# Patient Record
Sex: MALE | Race: WHITE | Employment: UNEMPLOYED | URBAN - METROPOLITAN AREA
[De-identification: names, ages, dates, MRNs, and addresses within clinical notes are randomized per-mention and may not be internally consistent; named-entity substitution may affect disease eponyms.]

---

## 2022-09-07 ENCOUNTER — TELEPHONE (OUTPATIENT)
Dept: CARDIOLOGY CLINIC | Age: 57
End: 2022-09-07

## 2022-09-07 NOTE — TELEPHONE ENCOUNTER
New patient referral for Dr. Katiuska Alvares from Gensarah Arnold NP for chest pain and shortness of breath. Please offer 345 on Monday 9/12/22 or 830 on 9/19.

## 2022-09-07 NOTE — TELEPHONE ENCOUNTER
I called and left a message for Anita Monroy to call the office to scheduled appointment with Dr. Misha Barber.

## 2022-09-12 ENCOUNTER — OFFICE VISIT (OUTPATIENT)
Dept: CARDIOLOGY CLINIC | Age: 57
End: 2022-09-12
Payer: MEDICAID

## 2022-09-12 VITALS
HEART RATE: 62 BPM | DIASTOLIC BLOOD PRESSURE: 92 MMHG | HEIGHT: 72 IN | OXYGEN SATURATION: 96 % | BODY MASS INDEX: 34.21 KG/M2 | WEIGHT: 252.6 LBS | SYSTOLIC BLOOD PRESSURE: 156 MMHG

## 2022-09-12 DIAGNOSIS — Z00.00 ROUTINE GENERAL MEDICAL EXAMINATION AT A HEALTH CARE FACILITY: Primary | ICD-10-CM

## 2022-09-12 DIAGNOSIS — R06.02 SOB (SHORTNESS OF BREATH): ICD-10-CM

## 2022-09-12 PROCEDURE — 93000 ELECTROCARDIOGRAM COMPLETE: CPT | Performed by: INTERNAL MEDICINE

## 2022-09-12 PROCEDURE — 99204 OFFICE O/P NEW MOD 45 MIN: CPT | Performed by: INTERNAL MEDICINE

## 2022-09-12 RX ORDER — LISINOPRIL 10 MG/1
10 TABLET ORAL DAILY
Qty: 30 TABLET | Refills: 5 | Status: SHIPPED | OUTPATIENT
Start: 2022-09-12

## 2022-09-12 NOTE — PROGRESS NOTES
Cardiology Consultation     Michelle Almonte  1965    PCP: SHERI Bowman CNP  Referring Physician: Timmy Mcgee CNP   Reason for Referral: Chest pain   Chief Complaint:   Chief Complaint   Patient presents with    New Patient     New pt x Chest tightness     Subjective:   History of Present Illness: The patient is 61 y.o. male with a past medical history significant for hypertension and hyperlipidemia. He presents as referral from Timmy Mcgee CNP regarding chest pain. He reports ongoing shortness of breath and a decline in his overall health. He experienced an injury at work and reports his symptoms began at that time. He has limitations in his mobility related to this accident and presents with a cane to aid in ambulation. He will be undergoing repair of a torn meniscus in the future. He will become short of breath with walking even short distances. He reports ongoing fatigue and does snore at night. He will experience chest pain at times as well. He has never been a smoker. His father experienced a heart attack at 55 and passed from cardiac causes at 47. Past Medical History:   Diagnosis Date    Pneumonia      Past Surgical History:   Procedure Laterality Date    APPENDECTOMY       Family History   Problem Relation Age of Onset    Kidney Disease Mother     Heart Failure Mother     Heart Attack Father 52    Heart Disease Father     Kidney Disease Brother      Social History     Tobacco Use    Smoking status: Former     Packs/day: 0.50     Years: 20.00     Pack years: 10.00     Types: Cigarettes     Quit date:      Years since quittin.7    Smokeless tobacco: Never   Substance Use Topics    Alcohol use:  Yes     Alcohol/week: 6.0 standard drinks     Types: 6 Cans of beer per week     Comment: 1 beer daily    Drug use: No       No Known Allergies  Current Outpatient Medications   Medication Sig Dispense Refill    atorvastatin (LIPITOR) 40 MG tablet Take 1 tablet by mouth daily 90 tablet 1    escitalopram (LEXAPRO) 10 MG tablet Take 1 tablet by mouth daily 30 tablet 1    metoprolol succinate (TOPROL XL) 25 MG extended release tablet Take 1 tablet by mouth daily 30 tablet 1     No current facility-administered medications for this visit. Review of Systems:  Constitutional: No unanticipated weight loss. There's been no change in energy level, sleep pattern, or activity level. No fevers, chills. Eyes: No visual changes or diplopia. No scleral icterus. ENT: No Headaches, hearing loss or vertigo. No mouth sores or sore throat. Cardiovascular: as reviewed in HPI  Respiratory: No cough or wheezing, no sputum production. No hemoptysis. Gastrointestinal: No abdominal pain, appetite loss, blood in stools. No change in bowel or bladder habits. Genitourinary: No dysuria, trouble voiding, or hematuria. Musculoskeletal:  No gait disturbance, no joint complaints. Integumentary: No rash or pruritis. Neurological: No headache, diplopia, change in muscle strength, numbness or tingling. Psychiatric: No anxiety or depression. Endocrine: No temperature intolerance. No excessive thirst, fluid intake, or urination. No tremor. Hematologic/Lymphatic: No abnormal bruising or bleeding, blood clots or swollen lymph nodes. Allergic/Immunologic: No nasal congestion or hives. Physical Exam:   BP (!) 156/92 (Site: Left Upper Arm, Position: Sitting, Cuff Size: Medium Adult)   Pulse 62   Ht 6' (1.829 m)   Wt 252 lb 9.6 oz (114.6 kg)   SpO2 96%   BMI 34.26 kg/m²   Wt Readings from Last 3 Encounters:   09/12/22 252 lb 9.6 oz (114.6 kg)   09/07/22 250 lb 12.8 oz (113.8 kg)   08/31/18 242 lb 4.6 oz (109.9 kg)     Constitutional: He is oriented to person, place, and time. He appears well-developed and well-nourished. In no acute distress. Head: Normocephalic and atraumatic. Pupils equal and round. Neck: Neck supple. No JVP or carotid bruit appreciated.  No mass and no thyromegaly present. No lymphadenopathy present. Cardiovascular: Normal rate. Normal heart sounds. Exam reveals no gallop and no friction rub. No murmur heard. Pulmonary/Chest: Effort normal and breath sounds normal. No respiratory distress. He has no wheezes, rhonchi or rales. Abdominal: Soft, non-tender. Bowel sounds are normal. He exhibits no organomegaly, mass or bruit. Extremities: No edema. No cyanosis or clubbing. Pulses are 2+ radial/carotid bilaterally. Neurological: No gross cranial nerve deficit. Coordination normal.   Skin: Skin is warm and dry. There is no rash or diaphoresis. Psychiatric: He has a normal mood and affect. His speech is normal and behavior is normal.     Lab Review:   FLP:    Lab Results   Component Value Date/Time    TRIG 300 09/07/2022 01:48 PM    HDL 39 09/07/2022 01:48 PM    LDLCALC 136 09/07/2022 01:48 PM    LABVLDL 60 09/07/2022 01:48 PM     BUN/Creatinine:    Lab Results   Component Value Date/Time    BUN 17 09/07/2022 01:48 PM    CREATININE 1.2 09/07/2022 01:48 PM     PT/INR, TNI, HGB A1C:   Lab Results   Component Value Date/Time    LABA1C 5.4 09/07/2022 01:48 PM      No results found for: CBCAUTODIF    EKG Interpretation: NSR, normal ECG       All above diagnostic testing and laboratory data was independently visualized and reviewed by me (not simply review of report)       Assessment and Plan   1) Chest pain   Occurs with both rest or exertion   Complete previously ordered stress perfusion     2) Shortness of breath   Complete echocardiogram to rule out structural abnormalities     3) Essential hypertension   Uncontrolled  Begin lisinopril 10 mg daily     Follow up in 1 year or sooner pending test results       Thank you very much for allowing me to participate in the care of your patient. Please do not hesitate to contact me if you have any questions.       Kianna Parmar MD [address] [place] [address], Interventional Cardiology, and Peripheral Vascular Disease   [facility] [place]   Ph: [phone]  Fax: [phone]      This note was scribed in the presence of Tammie Mason MD by Ben Hutton RN. Physician Attestation:  The scribes documentation has been prepared under my direction and personally reviewed by me in its entirety. I confirm the note above accurately reflects all work, treatment, procedures, and medical decision making performed by me.     Electronically signed by Anna Loza MD on 10/2/2022 at 2:48 PM

## 2022-09-21 ENCOUNTER — HOSPITAL ENCOUNTER (OUTPATIENT)
Dept: NON INVASIVE DIAGNOSTICS | Age: 57
Discharge: HOME | End: 2022-09-21
Payer: MEDICAID

## 2022-09-21 DIAGNOSIS — R07.89 CHEST TIGHTNESS: ICD-10-CM

## 2022-09-21 LAB
LV EF: 63 %
LVEF MODALITY: NORMAL

## 2022-09-21 PROCEDURE — 93017 CV STRESS TEST TRACING ONLY: CPT

## 2022-09-21 PROCEDURE — 6360000002 HC RX W HCPCS

## 2022-09-21 PROCEDURE — 78452 HT MUSCLE IMAGE SPECT MULT: CPT

## 2022-09-21 PROCEDURE — A9502 TC99M TETROFOSMIN: HCPCS | Performed by: NURSE PRACTITIONER

## 2022-09-21 PROCEDURE — 3430000000 HC RX DIAGNOSTIC RADIOPHARMACEUTICAL: Performed by: NURSE PRACTITIONER

## 2022-09-21 PROCEDURE — 6360000002 HC RX W HCPCS: Performed by: NURSE PRACTITIONER

## 2022-09-21 RX ADMIN — TETROFOSMIN 30 MILLICURIE: 1.38 INJECTION, POWDER, LYOPHILIZED, FOR SOLUTION INTRAVENOUS at 16:05

## 2022-09-21 RX ADMIN — TETROFOSMIN 11 MILLICURIE: 1.38 INJECTION, POWDER, LYOPHILIZED, FOR SOLUTION INTRAVENOUS at 13:38

## 2022-09-21 RX ADMIN — REGADENOSON 0.4 MG: 0.08 INJECTION, SOLUTION INTRAVENOUS at 16:05

## 2022-10-21 ENCOUNTER — HOSPITAL ENCOUNTER (OUTPATIENT)
Dept: CARDIOLOGY | Age: 57
Discharge: HOME | End: 2022-10-21
Payer: MEDICAID

## 2022-10-21 DIAGNOSIS — R06.02 SOB (SHORTNESS OF BREATH): ICD-10-CM

## 2022-10-21 PROCEDURE — 93306 TTE W/DOPPLER COMPLETE: CPT

## 2022-11-01 RX ORDER — DICLOFENAC SODIUM 75 MG/1
TABLET, DELAYED RELEASE ORAL 2 TIMES DAILY
COMMUNITY
Start: 2022-10-06 | End: 2022-11-17

## 2022-11-01 RX ORDER — AMOXICILLIN 500 MG/1
500 CAPSULE ORAL 3 TIMES DAILY
COMMUNITY
End: 2022-11-17

## 2022-11-01 NOTE — PROGRESS NOTES
[facility] PRE-SURGICAL TESTING INSTRUCTIONS                      PRIOR TO PROCEDURE DATE:    1. PLEASE FOLLOW ANY INSTRUCTIONS GIVEN TO YOU PER YOUR SURGEON. 2. Arrange for someone to drive you home and be with you for the first 24 hours after discharge for your safety after your procedure for which you received sedation. Ensure it is someone we can share information with regarding your discharge. NOTE: At this time ONLY 2 ADULTS may accompany you     3. You must contact your surgeon for instructions IF:  You are taking any blood thinners, aspirin, anti-inflammatory or vitamins. There is a change in your physical condition such as a cold, fever, rash, cuts, sores, or any other infection, especially near your surgical site. 4. Do not drink alcohol the day before or day of your procedure. Do not use any recreational marijuana at least 24 hours or street drugs (heroin, cocaine) at minimum 5 days prior to your procedure. 5. A Pre-Surgical History and Physical MUST be completed WITHIN 30 DAYS OR LESS prior to your procedure. by your Physician or an Urgent Care        THE DAY OF YOUR PROCEDURE:  1. Follow instructions for ARRIVAL TIME as DIRECTED BY YOUR SURGEON. 2. Enter the MAIN entrance from [address] and follow the signs to the free Parking [employer] or [employer] (offered free of charge 7 am-5pm). 3. Enter the Main Entrance of the hospital (do not enter from the lower level of the parking garage). Upon entrance, check in with the  at the surgical information desk on your LEFT. Bring your insurance card and photo ID to register      4. DO NOT EAT ANYTHING 8 hours prior to arrival for surgery. You may have up to 8 ounces of water 4 hours prior to your arrival for surgery. NOTE: ALL Gastric, Bariatric & Bowel surgery patients - you MUST follow your surgeon's instructions regarding eating/ drinking as you will have very specific instructions to follow.   If you did not receive these, call your surgeon's office immediately. 5. MEDICATIONS:  Take the following medications with a SMALL sip of water: METOPROLOL  Use your usual dose of inhalers the morning of surgery. BRING your rescue inhaler with you to hospital.   Anesthesia does NOT want you to take insulin the morning of surgery. They will control your blood sugar while you are at the hospital. Please contact your ordering physician for instructions regarding your insulin the night before your procedure. If you have an insulin pump, please keep it set on basal rate. Bariatric patient's call your surgeon if on diabetic medications as some may need to be stopped 1 week prior to surgery    6. Do not swallow additional water when brushing teeth. No gum, candy, mints, or ice chips. Refrain from smoking or at least decrease the amount on day of surgery. 7. Morning of surgery:   Take a shower with an antibacterial soap (i.e., Safeguard or Dial) OR your physician may have instructed you to use Hibiclens. Dress in loose, comfortable clothing appropriate for redressing after your procedure. Do not wear jewelry (including body piercings), make-up (especially NO eye make-up), fingernail polish (NO toenail polish if foot/leg surgery), lotion, powders, or metal hairclips. Do not shave or wax for 72 hours prior to procedure near your operative site. Shaving with a razor can irritate your skin and make it easier to develop an infection. On the day of your procedure, any hair that needs to be removed near the surgical site will be 'clipped' by a healthcare worker using a special clipper designed to avoid skin irritation. 8. Dentures, glasses, or contacts will need to be removed before your procedure. Bring cases for your glasses, contacts, dentures, or hearing aids to protect them while you are in surgery. 9. If you use a CPAP, please bring it with you on the day of your procedure.     10. We recommend that valuable personal belongings such as cash, cell phones, e-tablets, or jewelry, be left at home during your stay. The hospital will not be responsible for valuables that are not secured in the hospital safe. However, if your insurance requires a co-pay, you may want to bring a method of payment, i.e., Check or credit card, if you wish to pay your co-pay the day of surgery. 11. If you are to stay overnight, you may bring a bag with personal items. Please have any large items you may need brought in by your family after your arrival to your hospital room. 12. If you have a Living Will or Durable Power of , please bring a copy on the day of your procedure. How we keep you safe and work to prevent surgical site infections:   1. Health care workers should always check your ID bracelet to verify your name and birth date. You will be asked many times to state your name, date of birth, and allergies. 2. Health care workers should always clean their hands with soap or alcohol gel before providing care to you. It is okay to ask anyone if they cleaned their hands before they touch you. 3. You will be actively involved in verifying the type of procedure you are having and ensuring the correct surgical site. This will be confirmed multiple times prior to your procedure. Do NOT laura your surgery site UNLESS instructed to by your surgeon. 4. When you are in the operating room, your surgical site will be cleansed with a special soap, and in most cases, you will be given an antibiotic before the surgery begins. What to expect AFTER your procedure? 1. Immediately following your procedure, your will be taken to the PACU for the first phase of your recovery. Your nurse will help you recover from any potential side effects of anesthesia, such as extreme drowsiness, changes in your vital signs or breathing patterns. Nausea, headache, muscle aches, or sore throat may also occur after anesthesia.   Your nurse will help you manage these potential side effects. 2. For comfort and safety, arrange to have someone at home with you for the first 24 hours after discharge. 3. You and your family will be given written instructions about your diet, activity, dressing care, medications, and return visits. 4. Once at home, should issues with nausea, pain, or bleeding occur, or should you notice any signs of infection, you should call your surgeon. 5. Always clean your hands before and after caring for your wound. Do not let your family touch your surgery site without cleaning their hands. 6. Narcotic pain medications can cause significant constipation. You may want to add a stool softener to your postoperative medication schedule or speak to your surgeon on how best to manage this SIDE EFFECT. SPECIAL INSTRUCTIONS     Thank you for allowing us to care for you. We strive to exceed your expectations in the delivery of care and service provided to you and your family. If you need to contact the [place] [address] staff for any reason, please call us at [phone]    Instructions reviewed with patient'S SIGNIFICANT OTHER - DELORES  during preadmission testing phone interview.   Saúl Dorsey RN.11/1/2022 .10:50 AM      ADDITIONAL EDUCATIONAL INFORMATION REVIEWED PER PHONE WITH YOU AND/OR YOUR FAMILY:  Yes Hibiclens® Bathing Instructions   Yes Antibacterial Soap   PER SURGOEN INSTRUCTIONS

## 2022-11-01 NOTE — PROGRESS NOTES
Surgery was changed from 10/5 to 11/2 due to OR being closed for Mosque holiday. Patient has not seen PCP since 9/7 and patient significant other states the were not told he needed a pre-op within 30 days. Patient on amoxicillin because crown fell off recently. Significant other stated patient did not have any mouth pain or signs of infection, assuming antibiotic was only preventative since appointment for temporary crown not available for a few more weeks. Stated nothing loose or chipped in mouth. Patient has not received arrival time et. Notified arrival time NEEDS TO COME FROM SURGEON, but informed of  time listed in Epic currently. Bhavana Teixeira, at surgeon office. Notified of all above and that [address] [place] [address] TO DO H&P DOS.    Baldev Pap

## 2022-11-01 NOTE — PROGRESS NOTES
Place patient label inside box (if no patient label, complete below)  Name:  :  MR#:   [address] / PROCEDURE  I (we), John Patel (Patient Name) authorize Andreea Manzano MD  (Provider / Mary Gil) and/or such assistants as may be selected by him/her, to perform the following operation/procedure(s): RIGHT KNEE ARTHROSCOPY WITH MEDIAL MENISCUS REPAIR        Note: If unable to obtain consent prior to an emergent procedure, document the emergent reason in the medical record. This procedure has been explained to my (our) satisfaction and included in the explanation was: The intended benefit, nature, and extent of the procedure to be performed; The significant risks involved and the probability of success; Alternative procedures and methods of treatment; The dangers and probable consequences of such alternatives (including no procedure or treatment); The expected consequences of the procedure on my future health; Whether other qualified individuals would be performing important surgical tasks and/or whether  would be present to advise or support the procedure. I (we) understand that there are other risks of infection and other serious complications in the pre-operative/procedural and postoperative/procedural stages of my (our) care. I (we) have asked all of the questions which I (we) thought were important in deciding whether or not to undergo treatment or diagnosis. These questions have been answered to my (our) satisfaction. I (we) understand that no assurance can be given that the procedure will be a success, and no guarantee or warranty of success has been given to me (us). It has been explained to me (us) that during the course of the operation/procedure, unforeseen conditions may be revealed that necessitate extension of the original procedure(s) or different procedure(s) than those set forth in Paragraph 1.  I (we) authorize and request that the above-named physician, his/her assistants or his/her designees, perform procedures as necessary and desirable if deemed to be in my (our) best interest.     Revised 8/2/2021                                                                          Page 1 of 2       I acknowledge that health care personnel may be observing this procedure for the purpose of medical education or other specified purposes as may be necessary as requested and/or approved by my (our) physician. I (we) consent to the disposal by the hospital Pathologist of the removed tissue, parts or organs in accordance with hospital policy. I do ____ do not ____ consent to the use of a local infiltration pain blocking agent that will be used by my provider/surgical provider to help alleviate pain during my procedure. I do ____ do not ____ consent to an emergent blood transfusion in the case of a life-threatening situation that requires blood components to be administered. This consent is valid for 24 hours from the beginning of the procedure. This patient does ____ or does not ____ currently have a DNR status/order. If DNR order is in place, obtain Addendum to the Surgical Consent for ALL Patients with a DNR Order to address blaise-operative status for limited intervention or DNR suspension.      I have read and fully understand the above Consent for Operation/Procedure and that all blanks were completed before I signed the consent.   _____________________________       _____________________      ____/____am/pm  Signature of Patient or legal representative      Printed Name / Relationship            Date / Time   ____________________________       _____________________      ____/____am/pm  Witness to Signature                                    Printed Name                    Date / Time    If patient is unable to sign or is a minor, complete the following)  Patient is a minor, ____ years of age, or unable to sign because:   ______________________________________________________________________________________________    If a phone consent is obtained, consent will be documented by using two health care professionals, each affirming that the consenting party has no questions and gives consent for the procedure discussed with the physician/provider.   _____________________          ____________________       _____/_____am/pm   2nd witness to phone consent        Printed name           Date / Time    Informed Consent:  I have provided the explanation described above in section 1 to the patient and/or legal representative.  I have provided the patient and/or legal representative with an opportunity to ask any questions about the proposed operation/procedure.   ___________________________          ____________________         ____/____am/pm  Provider / Proceduralist                            Printed name            Date / Time  Revised 8/2/2021                                                                      Page 2 of 2

## 2022-11-02 ENCOUNTER — ANESTHESIA (OUTPATIENT)
Dept: OPERATING ROOM | Age: 57
End: 2022-11-02
Payer: MEDICAID

## 2022-11-02 ENCOUNTER — ANESTHESIA EVENT (OUTPATIENT)
Dept: OPERATING ROOM | Age: 57
End: 2022-11-02
Payer: MEDICAID

## 2022-11-02 ENCOUNTER — HOSPITAL ENCOUNTER (OUTPATIENT)
Age: 57
Setting detail: OUTPATIENT SURGERY
Discharge: HOME | End: 2022-11-02
Attending: ORTHOPAEDIC SURGERY | Admitting: ORTHOPAEDIC SURGERY
Payer: MEDICAID

## 2022-11-02 VITALS
HEART RATE: 64 BPM | SYSTOLIC BLOOD PRESSURE: 125 MMHG | HEIGHT: 72 IN | DIASTOLIC BLOOD PRESSURE: 89 MMHG | WEIGHT: 247.6 LBS | OXYGEN SATURATION: 99 % | BODY MASS INDEX: 33.54 KG/M2 | TEMPERATURE: 97.4 F | RESPIRATION RATE: 16 BRPM

## 2022-11-02 DIAGNOSIS — S83.241A ACUTE TEAR OF POSTERIOR ASPECT OF MEDIAL MENISCUS OF RIGHT KNEE: Primary | ICD-10-CM

## 2022-11-02 PROCEDURE — 6360000002 HC RX W HCPCS: Performed by: ANESTHESIOLOGY

## 2022-11-02 PROCEDURE — 7100000011 HC PHASE II RECOVERY - ADDTL 15 MIN: Performed by: ORTHOPAEDIC SURGERY

## 2022-11-02 PROCEDURE — 2720000010 HC SURG SUPPLY STERILE: Performed by: ORTHOPAEDIC SURGERY

## 2022-11-02 PROCEDURE — 3700000001 HC ADD 15 MINUTES (ANESTHESIA): Performed by: ORTHOPAEDIC SURGERY

## 2022-11-02 PROCEDURE — 6360000002 HC RX W HCPCS: Performed by: ORTHOPAEDIC SURGERY

## 2022-11-02 PROCEDURE — 7100000001 HC PACU RECOVERY - ADDTL 15 MIN: Performed by: ORTHOPAEDIC SURGERY

## 2022-11-02 PROCEDURE — 2580000003 HC RX 258: Performed by: ANESTHESIOLOGY

## 2022-11-02 PROCEDURE — 3700000000 HC ANESTHESIA ATTENDED CARE: Performed by: ORTHOPAEDIC SURGERY

## 2022-11-02 PROCEDURE — 6370000000 HC RX 637 (ALT 250 FOR IP): Performed by: ORTHOPAEDIC SURGERY

## 2022-11-02 PROCEDURE — 7100000000 HC PACU RECOVERY - FIRST 15 MIN: Performed by: ORTHOPAEDIC SURGERY

## 2022-11-02 PROCEDURE — 6370000000 HC RX 637 (ALT 250 FOR IP): Performed by: ANESTHESIOLOGY

## 2022-11-02 PROCEDURE — 7100000010 HC PHASE II RECOVERY - FIRST 15 MIN: Performed by: ORTHOPAEDIC SURGERY

## 2022-11-02 PROCEDURE — 6360000002 HC RX W HCPCS

## 2022-11-02 PROCEDURE — 2580000003 HC RX 258: Performed by: ORTHOPAEDIC SURGERY

## 2022-11-02 PROCEDURE — 64447 NJX AA&/STRD FEMORAL NRV IMG: CPT | Performed by: ANESTHESIOLOGY

## 2022-11-02 PROCEDURE — 2500000003 HC RX 250 WO HCPCS

## 2022-11-02 PROCEDURE — 3600000014 HC SURGERY LEVEL 4 ADDTL 15MIN: Performed by: ORTHOPAEDIC SURGERY

## 2022-11-02 PROCEDURE — 3600000004 HC SURGERY LEVEL 4 BASE: Performed by: ORTHOPAEDIC SURGERY

## 2022-11-02 PROCEDURE — 2709999900 HC NON-CHARGEABLE SUPPLY: Performed by: ORTHOPAEDIC SURGERY

## 2022-11-02 RX ORDER — ROPIVACAINE HYDROCHLORIDE 5 MG/ML
INJECTION, SOLUTION EPIDURAL; INFILTRATION; PERINEURAL
Status: COMPLETED | OUTPATIENT
Start: 2022-11-02 | End: 2022-11-02

## 2022-11-02 RX ORDER — SODIUM CHLORIDE, SODIUM LACTATE, POTASSIUM CHLORIDE, CALCIUM CHLORIDE 600; 310; 30; 20 MG/100ML; MG/100ML; MG/100ML; MG/100ML
INJECTION, SOLUTION INTRAVENOUS CONTINUOUS
Status: DISCONTINUED | OUTPATIENT
Start: 2022-11-02 | End: 2022-11-02 | Stop reason: HOSPADM

## 2022-11-02 RX ORDER — OXYCODONE HCL 10 MG/1
10 TABLET, FILM COATED, EXTENDED RELEASE ORAL ONCE
Status: COMPLETED | OUTPATIENT
Start: 2022-11-02 | End: 2022-11-02

## 2022-11-02 RX ORDER — METOPROLOL SUCCINATE 50 MG/1
25 TABLET, EXTENDED RELEASE ORAL ONCE
Status: COMPLETED | OUTPATIENT
Start: 2022-11-02 | End: 2022-11-02

## 2022-11-02 RX ORDER — PROCHLORPERAZINE EDISYLATE 5 MG/ML
5 INJECTION INTRAMUSCULAR; INTRAVENOUS
Status: DISCONTINUED | OUTPATIENT
Start: 2022-11-02 | End: 2022-11-02 | Stop reason: HOSPADM

## 2022-11-02 RX ORDER — ONDANSETRON 2 MG/ML
INJECTION INTRAMUSCULAR; INTRAVENOUS PRN
Status: DISCONTINUED | OUTPATIENT
Start: 2022-11-02 | End: 2022-11-02 | Stop reason: SDUPTHER

## 2022-11-02 RX ORDER — OXYCODONE HYDROCHLORIDE 5 MG/1
5 TABLET ORAL EVERY 6 HOURS PRN
Qty: 28 TABLET | Refills: 0 | Status: SHIPPED | OUTPATIENT
Start: 2022-11-02 | End: 2022-11-09

## 2022-11-02 RX ORDER — SODIUM CHLORIDE 0.9 % (FLUSH) 0.9 %
5-40 SYRINGE (ML) INJECTION PRN
Status: DISCONTINUED | OUTPATIENT
Start: 2022-11-02 | End: 2022-11-02 | Stop reason: HOSPADM

## 2022-11-02 RX ORDER — SODIUM CHLORIDE 0.9 % (FLUSH) 0.9 %
5-40 SYRINGE (ML) INJECTION EVERY 12 HOURS SCHEDULED
Status: DISCONTINUED | OUTPATIENT
Start: 2022-11-02 | End: 2022-11-02 | Stop reason: HOSPADM

## 2022-11-02 RX ORDER — DEXAMETHASONE SODIUM PHOSPHATE 4 MG/ML
INJECTION, SOLUTION INTRA-ARTICULAR; INTRALESIONAL; INTRAMUSCULAR; INTRAVENOUS; SOFT TISSUE PRN
Status: DISCONTINUED | OUTPATIENT
Start: 2022-11-02 | End: 2022-11-02 | Stop reason: SDUPTHER

## 2022-11-02 RX ORDER — ONDANSETRON 2 MG/ML
4 INJECTION INTRAMUSCULAR; INTRAVENOUS
Status: DISCONTINUED | OUTPATIENT
Start: 2022-11-02 | End: 2022-11-02 | Stop reason: HOSPADM

## 2022-11-02 RX ORDER — MIDAZOLAM HYDROCHLORIDE 1 MG/ML
INJECTION INTRAMUSCULAR; INTRAVENOUS PRN
Status: DISCONTINUED | OUTPATIENT
Start: 2022-11-02 | End: 2022-11-02 | Stop reason: SDUPTHER

## 2022-11-02 RX ORDER — PROPOFOL 10 MG/ML
INJECTION, EMULSION INTRAVENOUS PRN
Status: DISCONTINUED | OUTPATIENT
Start: 2022-11-02 | End: 2022-11-02 | Stop reason: SDUPTHER

## 2022-11-02 RX ORDER — DEXAMETHASONE SODIUM PHOSPHATE 10 MG/ML
INJECTION, SOLUTION INTRAMUSCULAR; INTRAVENOUS
Status: DISCONTINUED
Start: 2022-11-02 | End: 2022-11-02 | Stop reason: HOSPADM

## 2022-11-02 RX ORDER — ASPIRIN 81 MG/1
81 TABLET ORAL 2 TIMES DAILY
Qty: 60 TABLET | Refills: 0 | Status: SHIPPED | OUTPATIENT
Start: 2022-11-02

## 2022-11-02 RX ORDER — AMOXICILLIN 250 MG
2 CAPSULE ORAL DAILY PRN
Qty: 30 TABLET | Refills: 2 | Status: SHIPPED | OUTPATIENT
Start: 2022-11-02 | End: 2022-11-17

## 2022-11-02 RX ORDER — SODIUM CHLORIDE 9 MG/ML
INJECTION, SOLUTION INTRAVENOUS PRN
Status: DISCONTINUED | OUTPATIENT
Start: 2022-11-02 | End: 2022-11-02 | Stop reason: HOSPADM

## 2022-11-02 RX ORDER — ROPIVACAINE HYDROCHLORIDE 5 MG/ML
INJECTION, SOLUTION EPIDURAL; INFILTRATION; PERINEURAL
Status: COMPLETED
Start: 2022-11-02 | End: 2022-11-02

## 2022-11-02 RX ORDER — FENTANYL CITRATE 50 UG/ML
25 INJECTION, SOLUTION INTRAMUSCULAR; INTRAVENOUS EVERY 5 MIN PRN
Status: COMPLETED | OUTPATIENT
Start: 2022-11-02 | End: 2022-11-02

## 2022-11-02 RX ORDER — FENTANYL CITRATE 50 UG/ML
INJECTION, SOLUTION INTRAMUSCULAR; INTRAVENOUS PRN
Status: DISCONTINUED | OUTPATIENT
Start: 2022-11-02 | End: 2022-11-02 | Stop reason: SDUPTHER

## 2022-11-02 RX ORDER — LABETALOL HYDROCHLORIDE 5 MG/ML
10 INJECTION, SOLUTION INTRAVENOUS
Status: DISCONTINUED | OUTPATIENT
Start: 2022-11-02 | End: 2022-11-02 | Stop reason: HOSPADM

## 2022-11-02 RX ORDER — LIDOCAINE HYDROCHLORIDE 20 MG/ML
INJECTION, SOLUTION EPIDURAL; INFILTRATION; INTRACAUDAL; PERINEURAL PRN
Status: DISCONTINUED | OUTPATIENT
Start: 2022-11-02 | End: 2022-11-02 | Stop reason: SDUPTHER

## 2022-11-02 RX ORDER — HYDRALAZINE HYDROCHLORIDE 20 MG/ML
10 INJECTION INTRAMUSCULAR; INTRAVENOUS
Status: DISCONTINUED | OUTPATIENT
Start: 2022-11-02 | End: 2022-11-02 | Stop reason: HOSPADM

## 2022-11-02 RX ORDER — LIDOCAINE HYDROCHLORIDE 10 MG/ML
1 INJECTION, SOLUTION EPIDURAL; INFILTRATION; INTRACAUDAL; PERINEURAL
Status: DISCONTINUED | OUTPATIENT
Start: 2022-11-02 | End: 2022-11-02 | Stop reason: HOSPADM

## 2022-11-02 RX ORDER — CELECOXIB 200 MG/1
400 CAPSULE ORAL ONCE
Status: COMPLETED | OUTPATIENT
Start: 2022-11-02 | End: 2022-11-02

## 2022-11-02 RX ORDER — GABAPENTIN 300 MG/1
300 CAPSULE ORAL ONCE
Status: COMPLETED | OUTPATIENT
Start: 2022-11-02 | End: 2022-11-02

## 2022-11-02 RX ORDER — MIDAZOLAM HYDROCHLORIDE 1 MG/ML
INJECTION INTRAMUSCULAR; INTRAVENOUS
Status: COMPLETED
Start: 2022-11-02 | End: 2022-11-02

## 2022-11-02 RX ORDER — DEXAMETHASONE SODIUM PHOSPHATE 10 MG/ML
10 INJECTION, SOLUTION INTRAMUSCULAR; INTRAVENOUS ONCE
Status: COMPLETED | OUTPATIENT
Start: 2022-11-02 | End: 2022-11-02

## 2022-11-02 RX ADMIN — DEXAMETHASONE SODIUM PHOSPHATE 10 MG: 4 INJECTION, SOLUTION INTRAMUSCULAR; INTRAVENOUS at 13:13

## 2022-11-02 RX ADMIN — CEFAZOLIN 2000 MG: 2 INJECTION, POWDER, FOR SOLUTION INTRAMUSCULAR; INTRAVENOUS at 13:07

## 2022-11-02 RX ADMIN — MIDAZOLAM HYDROCHLORIDE 2 MG: 1 INJECTION INTRAMUSCULAR; INTRAVENOUS at 13:04

## 2022-11-02 RX ADMIN — MIDAZOLAM HYDROCHLORIDE 2 MG: 2 INJECTION, SOLUTION INTRAMUSCULAR; INTRAVENOUS at 12:52

## 2022-11-02 RX ADMIN — ROPIVACAINE HYDROCHLORIDE 30 ML: 5 INJECTION, SOLUTION EPIDURAL; INFILTRATION; PERINEURAL at 12:52

## 2022-11-02 RX ADMIN — FENTANYL CITRATE 25 MCG: 50 INJECTION INTRAMUSCULAR; INTRAVENOUS at 14:40

## 2022-11-02 RX ADMIN — PROPOFOL 200 MG: 10 INJECTION, EMULSION INTRAVENOUS at 13:09

## 2022-11-02 RX ADMIN — OXYCODONE HYDROCHLORIDE 10 MG: 10 TABLET, FILM COATED, EXTENDED RELEASE ORAL at 12:40

## 2022-11-02 RX ADMIN — FENTANYL CITRATE 50 MCG: 50 INJECTION, SOLUTION INTRAMUSCULAR; INTRAVENOUS at 13:09

## 2022-11-02 RX ADMIN — SODIUM CHLORIDE, POTASSIUM CHLORIDE, SODIUM LACTATE AND CALCIUM CHLORIDE: 600; 310; 30; 20 INJECTION, SOLUTION INTRAVENOUS at 12:39

## 2022-11-02 RX ADMIN — FENTANYL CITRATE 25 MCG: 50 INJECTION INTRAMUSCULAR; INTRAVENOUS at 14:45

## 2022-11-02 RX ADMIN — GABAPENTIN 300 MG: 300 CAPSULE ORAL at 12:40

## 2022-11-02 RX ADMIN — ONDANSETRON 4 MG: 2 INJECTION INTRAMUSCULAR; INTRAVENOUS at 13:13

## 2022-11-02 RX ADMIN — DEXAMETHASONE SODIUM PHOSPHATE 10 MG: 10 INJECTION, SOLUTION INTRAMUSCULAR; INTRAVENOUS at 12:40

## 2022-11-02 RX ADMIN — METOPROLOL SUCCINATE 25 MG: 50 TABLET, EXTENDED RELEASE ORAL at 12:56

## 2022-11-02 RX ADMIN — LIDOCAINE HYDROCHLORIDE 100 MG: 20 INJECTION, SOLUTION EPIDURAL; INFILTRATION; INTRACAUDAL; PERINEURAL at 13:09

## 2022-11-02 RX ADMIN — CELECOXIB 400 MG: 200 CAPSULE ORAL at 12:40

## 2022-11-02 RX ADMIN — FENTANYL CITRATE 50 MCG: 50 INJECTION, SOLUTION INTRAMUSCULAR; INTRAVENOUS at 13:16

## 2022-11-02 ASSESSMENT — ENCOUNTER SYMPTOMS
RESPIRATORY NEGATIVE: 1
EYES NEGATIVE: 1
ALLERGIC/IMMUNOLOGIC NEGATIVE: 1
GASTROINTESTINAL NEGATIVE: 1
SHORTNESS OF BREATH: 1

## 2022-11-02 ASSESSMENT — PAIN SCALES - GENERAL
PAINLEVEL_OUTOF10: 0
PAINLEVEL_OUTOF10: 1
PAINLEVEL_OUTOF10: 4
PAINLEVEL_OUTOF10: 1
PAINLEVEL_OUTOF10: 0
PAINLEVEL_OUTOF10: 4
PAINLEVEL_OUTOF10: 1

## 2022-11-02 ASSESSMENT — PAIN DESCRIPTION - PAIN TYPE
TYPE: ACUTE PAIN;SURGICAL PAIN
TYPE: ACUTE PAIN

## 2022-11-02 ASSESSMENT — PAIN DESCRIPTION - LOCATION
LOCATION: KNEE

## 2022-11-02 ASSESSMENT — PAIN DESCRIPTION - ORIENTATION
ORIENTATION: RIGHT

## 2022-11-02 ASSESSMENT — PAIN - FUNCTIONAL ASSESSMENT: PAIN_FUNCTIONAL_ASSESSMENT: PREVENTS OR INTERFERES SOME ACTIVE ACTIVITIES AND ADLS

## 2022-11-02 ASSESSMENT — PAIN DESCRIPTION - DESCRIPTORS
DESCRIPTORS: ACHING
DESCRIPTORS: ACHING
DESCRIPTORS: DISCOMFORT
DESCRIPTORS: ACHING
DESCRIPTORS: ACHING

## 2022-11-02 NOTE — PROGRESS NOTES
Confirmed pt ok to go home with crutches. Educated pt and significant other on proper use with verbalization of understanding. Good return demo.

## 2022-11-02 NOTE — ANESTHESIA PRE PROCEDURE
Department of Anesthesiology  Preprocedure Note       Name:  Jose Yoon   Age:  61 y.o.  :  1965                                          MRN:  [id number]         Date:  2022      Surgeon: Tamika Vasquez):  Radha Garrett MD    Procedure: Procedure(s):  RIGHT KNEE ARTHROSCOPY WITH MEDIAL MENISCUS REPAIR    Medications prior to admission:   Prior to Admission medications    Medication Sig Start Date End Date Taking? Authorizing Provider   aspirin EC 81 MG EC tablet Take 1 tablet by mouth 2 times daily 22  Yes Radha Garrett MD   senna-docusate (PERICOLACE) 8.6-50 MG per tablet Take 2 tablets by mouth daily as needed for Constipation 22  Yes Radha Garrett MD   oxyCODONE (ROXICODONE) 5 MG immediate release tablet Take 1 tablet by mouth every 6 hours as needed for Pain for up to 7 days. Intended supply: 7 days.  Take lowest dose possible to manage pain 22 Yes Radha Garrett MD   diclofenac (VOLTAREN) 75 MG EC tablet Take by mouth 2 times daily 10/6/22  Yes Historical Provider, MD   amoxicillin (AMOXIL) 500 MG capsule Take 500 mg by mouth 3 times daily   Yes Historical Provider, MD   lisinopril (PRINIVIL;ZESTRIL) 10 MG tablet Take 1 tablet by mouth daily 22   Tobi Lyons MD   atorvastatin (LIPITOR) 40 MG tablet Take 1 tablet by mouth daily 22   SHERI Gong CNP   metoprolol succinate (TOPROL XL) 25 MG extended release tablet Take 1 tablet by mouth daily 22   SHERI Gong CNP       Current medications:    Current Facility-Administered Medications   Medication Dose Route Frequency Provider Last Rate Last Admin    lidocaine PF 1 % injection 1 mL  1 mL IntraDERmal Once PRN Gaby Antunez MD        lactated ringers infusion   IntraVENous Continuous Gaby Antunez  mL/hr at 22 1304 NoRateChange at 22 1304    sodium chloride flush 0.9 % injection 5-40 mL  5-40 mL IntraVENous 2 times per day Desire Canales MD        sodium chloride flush 0.9 % injection 5-40 mL  5-40 mL IntraVENous PRN Desire Canales MD        0.9 % sodium chloride infusion   IntraVENous PRN Desire Canales MD        lactated ringers infusion   IntraVENous Continuous Deborah Maya MD        dexamethasone (PF) (DECADRON) 10 MG/ML injection              Facility-Administered Medications Ordered in Other Encounters   Medication Dose Route Frequency Provider Last Rate Last Admin    midazolam (VERSED) injection   IntraVENous PRN Claudthom Mocha, DO   2 mg at 22 1252    midazolam (VERSED) injection   IntraVENous PRN Adaline Like   2 mg at 22 1304    lidocaine PF 2 % injection   IntraVENous PRN Maged Maricarmen Sheppard   100 mg at 22 1309    dexamethasone (DECADRON) injection   IntraVENous PRN Maged Maricarmen Sheppard   10 mg at 22 1313    ondansetron (ZOFRAN) injection   IntraVENous PRN Maged Maricarmen Sheppard   4 mg at 22 1313    fentaNYL (SUBLIMAZE) injection   IntraVENous PRN Maged Maricarmen Sheppard   50 mcg at 22 1316    propofol injection   IntraVENous PRN Maged Maricarmen Sheppard   200 mg at 22 1309       Allergies:  No Known Allergies    Problem List:  There is no problem list on file for this patient. Past Medical History:        Diagnosis Date    COV, residual cough    Hyperlipidemia     Hypertension     Medial meniscus tear     Pneumonia        Past Surgical History:        Procedure Laterality Date    APPENDECTOMY      COLONOSCOPY      MANDIBLE SURGERY         Social History:    Social History     Tobacco Use    Smoking status: Former     Packs/day: 0.50     Years: 20.00     Pack years: 10.00     Types: Cigarettes     Quit date:      Years since quittin.8    Smokeless tobacco: Never   Substance Use Topics    Alcohol use:  Yes     Alcohol/week: 6.0 standard drinks     Types: 6 Cans of beer per week     Comment: 1 beer daily Counseling given: Not Answered      Vital Signs (Current):   Vitals:    11/02/22 1211 11/02/22 1252 11/02/22 1255 11/02/22 1258   BP:  (!) 166/96 (!) 171/114 (!) 156/98   Pulse:   64 70   Resp:   13 22   Temp:       TempSrc:       SpO2:  100% 100% 100%   Weight: 247 lb 9.6 oz (112.3 kg)      Height: 6' (1.829 m)                                                 BP Readings from Last 3 Encounters:   11/02/22 (!) 156/98   10/05/22 (!) 149/78   09/12/22 (!) 156/92       NPO Status: Time of last liquid consumption: 2000                        Time of last solid consumption: 2000                        Date of last liquid consumption: 11/01/22                        Date of last solid food consumption: 11/01/22    BMI:   Wt Readings from Last 3 Encounters:   11/02/22 247 lb 9.6 oz (112.3 kg)   09/12/22 252 lb 9.6 oz (114.6 kg)   09/07/22 250 lb 12.8 oz (113.8 kg)     Body mass index is 33.58 kg/m². CBC:   Lab Results   Component Value Date/Time    WBC 6.9 09/07/2022 01:48 PM    RBC 5.38 09/07/2022 01:48 PM    HGB 16.0 09/07/2022 01:48 PM    HCT 47.2 09/07/2022 01:48 PM    MCV 87.7 09/07/2022 01:48 PM    RDW 13.8 09/07/2022 01:48 PM     09/07/2022 01:48 PM       CMP:   Lab Results   Component Value Date/Time     09/07/2022 01:48 PM    K 5.2 09/07/2022 01:48 PM    CL 99 09/07/2022 01:48 PM    CO2 26 09/07/2022 01:48 PM    BUN 17 09/07/2022 01:48 PM    CREATININE 1.2 09/07/2022 01:48 PM    GFRAA >60 09/07/2022 01:48 PM    AGRATIO 1.4 09/07/2022 01:48 PM    LABGLOM >60 09/07/2022 01:48 PM    GLUCOSE 97 09/07/2022 01:48 PM    PROT 7.6 09/07/2022 01:48 PM    CALCIUM 9.6 09/07/2022 01:48 PM    BILITOT 0.4 09/07/2022 01:48 PM    ALKPHOS 73 09/07/2022 01:48 PM    AST 22 09/07/2022 01:48 PM    ALT 24 09/07/2022 01:48 PM       POC Tests: No results for input(s): POCGLU, POCNA, POCK, POCCL, POCBUN, POCHEMO, POCHCT in the last 72 hours. Coags: No results found for: PROTIME, INR, APTT    HCG (If Applicable):  No results found for: PREGTESTUR, PREGSERUM, HCG, HCGQUANT     ABGs: No results found for: PHART, PO2ART, XBV6OAT, LGS8AXP, BEART, I7TGUHYL     Type & Screen (If Applicable):  No results found for: LABABO, LABRH    Drug/Infectious Status (If Applicable):  No results found for: HIV, HEPCAB    COVID-19 Screening (If Applicable):   Lab Results   Component Value Date/Time    COVID19 Not Detected 10/05/2022 03:49 PM           Anesthesia Evaluation  Patient summary reviewed and Nursing notes reviewed no history of anesthetic complications:   Airway: Mallampati: I  TM distance: >3 FB   Neck ROM: full  Mouth opening: > = 3 FB   Dental: normal exam         Pulmonary:normal exam  breath sounds clear to auscultation  (+) pneumonia: resolved,  shortness of breath:                             Cardiovascular:  Exercise tolerance: good (>4 METS),   (+) hypertension:,     (-) orthopnea, PND and  HARTMANN      Rhythm: regular  Rate: normal                    Neuro/Psych:   Negative Neuro/Psych ROS              GI/Hepatic/Renal: Neg GI/Hepatic/Renal ROS            Endo/Other:    (+) : arthritis: OA., .                 Abdominal:   (+) obese,     Abdomen: soft. Vascular: negative vascular ROS. Other Findings:           Anesthesia Plan      general     ASA 2       Induction: intravenous. MIPS: Postoperative opioids intended and Prophylactic antiemetics administered. Anesthetic plan and risks discussed with patient. Plan discussed with CRNA.     Attending anesthesiologist reviewed and agrees with Preprocedure content      Post-op pain plan if not by surgeon: single peripheral nerve block            Liz Serrano DO   11/2/2022

## 2022-11-02 NOTE — ANESTHESIA PROCEDURE NOTES
Peripheral Block    Patient location during procedure: pre-op  Reason for block: post-op pain management and at surgeon's request  Start time: 11/2/2022 12:52 PM  End time: 11/2/2022 12:56 PM  Staffing  Performed: anesthesiologist   Anesthesiologist: Jesus Melo DO  Preanesthetic Checklist  Completed: patient identified, IV checked, site marked, risks and benefits discussed, surgical/procedural consents, equipment checked, pre-op evaluation, timeout performed, anesthesia consent given, oxygen available, monitors applied/VS acknowledged, fire risk safety assessment completed and verbalized and blood product R/B/A discussed and consented  Peripheral Block   Patient position: supine  Prep: ChloraPrep  Provider prep: mask and sterile gloves  Patient monitoring: cardiac monitor, continuous pulse ox, continuous capnometry, frequent blood pressure checks, IV access, oxygen and responsive to questions  Block type: Femoral  Adductor canal  Laterality: right  Injection technique: single-shot  Guidance: ultrasound guided    Needle   Needle type: insulated echogenic nerve stimulator needle   Needle gauge: 20 G  Needle localization: anatomical landmarks and ultrasound guidance  Test dose: negative  Needle length: 10 cm  Assessment   Injection assessment: negative aspiration for heme, no paresthesia on injection, local visualized surrounding nerve on ultrasound, no intravascular symptoms and low pressure verified by pressure monitor  Paresthesia pain: none  Slow fractionated injection: yes  Hemodynamics: stable  Real-time US image taken/store: yes  Outcomes: uncomplicated and patient tolerated procedure well    Medications Administered  ropivacaine (NAROPIN) injection 0.5% - Perineural   30 mL - 11/2/2022 12:52:00 PM

## 2022-11-02 NOTE — PROGRESS NOTES
Ambulatory Surgery/Procedure Discharge Note    Vitals:    11/02/22 1545   BP: 125/89   Pulse: 64   Resp: 16   Temp: 97.4 °F (36.3 °C)   SpO2: 99%       No intake/output data recorded. Restroom use offered before discharge. Yes    Pain assessment:  level of pain (1-10, 10 severe),   Pain Level: 1    Pt and S.O./family states \"ready to go home\". Pt alert and oriented x4. IV removed. Denies N/V or pain. Dressing C, D & I. Discharge instructions given to pt and significant other with pt permission. Pt and significant other verbalized understanding of all instructions. Left with all belongings, prescriptions, and discharge instructions. <3 second cap refill and +2 palp pulses. Patient discharged to home/self care.  Patient discharged via wheel chair by transporter to waiting family/S.O.       11/2/2022 4:30 PM

## 2022-11-02 NOTE — OP NOTE
Shawn Camacho MD  [place] Office: [address], [address], [address] [facility] ([id number]) [address] [facility]  : 1965  PCP: SHERI Patrick - CNP    Surgery Date: 2022    OPERATIVE REPORT    PRE-OP DIAGNOSIS: Right knee medial meniscus tear and osteoarthritis       POST-OP DIAGNOSIS: Same    PROCEDURE: Right  Knee  Right knee arthroscopy  Partial medial meniscectomy   3 compartment synovectomy  medial femoral condyle chondroplasty    SURGEON: Babak Holley MD    ASSISTANT:  Surgical Assistant: Dustin Ennis , for the purposes of manipulation of the limb, and assistance with wound closure as needed. ANESTHESIA: General with adductor canal block    ANTIBIOTIC: CEFAZOLIN, DOSING PER NURSING CHART    ESTIMATED BLOOD LOSS:  5mL    COMPLICATIONS:  None    FINDINGS:    - Patella: Grade 2b  - Trochlea: 3a  - Tracking: Acceptable  - Medial compartment: MFC with large 3+cm grade 3a lesion, grade 3a tibia  - Lateral compartment: grade 2a  - Medial Meniscus: complex posterior horn tear with radial component  - Lateral Meniscus: Intact  - ACL: Intact  - PCL: Intact      IMPLANTS USED:   * No implants in log *    HISTORY OF PRESENT ILLNESS:  The patient is a(n) 61 y.o. male with a history of mechanical symptoms and medial meniscus tear, with minimal radiographic osteoarthritis who underwent multiple conservative therapies including  anti-inflammatories, injections, activity modification, attempted weight loss, and use of assistive devices. Once all of these measures had failed surgical options were discussed. He has continued to experience an unacceptable level of symptoms, specifically mechanical symptoms, and wished to undergo surgical intervention as above. No guarantees were made or implied. Informed consent was obtained.      RISKS OF SURGERY:  An extensive conversation was held with the patient and/or family regarding the risks, benefits, potential complication and reasonable expectations of the planned procedure. Informed verbal consent was given under no distress. We had ample opportunities for questions regarding the usual outcomes. Risks specifically discussed, but not limited to the following, include possible: bleeding, infection, damage to blood vessels and/ornerves, blood clots, anesthesia complications, arthrofibrosis, re-injury, need for additional surgery, continued pain, and post-operative stiffness or looseness. In addition, in rare cases the patient may have loss of a limb or even death. No guarantee of any particular results were given. The patient voiced understanding that in some cases surgery can make them worse. In spite of this, the patient and/or family desired that we proceed with the operation and expressed clear understanding of these risks. DETAILS OF PROCEDURE: The patient was brought into the operating room, and placed in the supine position on the table. The operative extremity was prepped and draped in the standard sterile fashion. A timeout was performed in confirming the appropriate side, site, procedure, patient, implant availability and antibiotic administration. Limb was exsanguinated via esmarch and tourniquet was inflated to 250 mmHg for approximately 25 minutes. Standard inferolateral and inferomedial arthroscopy portals were established. Diagnostic arthroscopy was performed noting the findings listed above. Arthroscopic 2+ compartment synovectomy - CPT 24380  Synovectomy was performed was performed in medial, latearl and patellofemoral compartments for adequate visualization and for removal of synovitis using a shaver. Chondroplasty - CPT [facility]  Medial femoral condyle was noted to have grade 3a changes with a large loose flap that was amenable to chondroplasty, which was performed using the shaver until stable borders were achieved.      Meniscectomy (includes chondroplasty) -  Medial CPT [id number];   Medial meniscus was found to have a complex posterior horn tear with a radial component that was not repairable. Using a combination of biter and shaver, this was trimmed back to stable borders. Tourniquet was let down. Hemostasis was achieved using electrocautery. The wound was closed in the usual layered fashion. The patient tolerated the procedure well. Sterile Dressings were applied. At the end of the procedure the instrument count was correct. There were no complications. Transferred stable to the PACU in stable condition    ASA  WBAT  Follow up in 1-2 weeks to start PT  He has significant tricompartmental osteoarthritis and would not be a good partial knee candidate          ASHLEY Arredondo MD  [facility]  Office: [phone]  Cell: [phone]    11/02/22  2:07 PM

## 2022-11-02 NOTE — ANESTHESIA POSTPROCEDURE EVALUATION
Department of Anesthesiology  Postprocedure Note    Patient: Aileen Dorsey  MRN: [id number]  YOB: 1965  Date of evaluation: 11/2/2022      Procedure Summary     Date: 11/02/22 Room / Location: [address] [place] [address]    Anesthesia Start: 4333 Anesthesia Stop:     Procedure: RIGHT KNEE ARTHROSCOPY WITH MEDIAL MENISCUS REPAIR (Right) Diagnosis:       Right knee pain, unspecified chronicity      (Right knee pain, unspecified chronicity [M25.561])    Surgeons: Isrrael Mercado MD Responsible Provider: Mary Valentine DO    Anesthesia Type: general ASA Status: 2          Anesthesia Type: No value filed.     Eladio Phase I: Eladio Score: 10    Eladio Phase II:        Anesthesia Post Evaluation    Patient location during evaluation: PACU  Patient participation: waiting for patient participation  Level of consciousness: lethargic  Pain score: 1  Airway patency: patent  Nausea & Vomiting: no nausea and no vomiting  Complications: no  Cardiovascular status: hemodynamically stable  Respiratory status: acceptable  Hydration status: stable  Multimodal analgesia pain management approach

## 2022-11-02 NOTE — PROGRESS NOTES
Anesthesia Dr. Mary Mccray here to do block. O2 placed. Start time:1248  Stop time:1256  Tolerated well    See flow sheet for vital signs.

## 2022-11-02 NOTE — DISCHARGE INSTRUCTIONS
Shawn Camacho MD  [place] Office: [address] [facility], [address], [address] [facility] [address] (0615) Riana Muniz [place] Discharge Instructions  For daytime questions or concerns, please call my clinic: [phone]  For urgent questions after hours call or text me on my cell: [phone]  If unavailable, you can still call the clinic number which will connect you to the on call physician    Please take a baby Aspirin 81mg twice daily for 30 days to prevent blood clots  Weight bearing as tolerated  Resume regular diet    Dressing/Wound Care: May remove outer dressing after 72 hours and replace with a clean dressing  Leave purple skin glue in place  May shower after 72 hours but do NOT submerge (I.e. no hot tubs, tub baths, pools, lakes, ocean ect). Avoid contact with dirty water. You may cover with a dry dressing or leave it open to the air. Keep it clean. Do not apply any cream or lotion to your incision  Please call immediately if any increasing redness or drainage, any fevers. For pain use the following combinations of medications. They work best together and are safe to take in combination as outlined below  -  Remember that swelling increases pain. When able, elevate and use ice to help with swelling  -  Acetaminophen (Tylenol) - this is OK to take in addition to an anti-inflammatory       - Maximum dosing of 1000mg every 6-8 hours (max 3000mg per day)  -  Anti-inflammatory options - select only one type of anti-inflammatory. All work equally well.  Use what you have available at home.       - Note: avoid this category of medications if you are taking blood thinners, have a history of stomach ulcers, or kidney disease       - Diclofenac - max dose 75mg twice daily (50mg if age over 72)       - Meloxicam - max dose up to 15mg daily (7.5mg if age over 72)       - Ibuprofen (motrin) - max dose up to 800mg every 8 hours (400mg if age over 72)       - Naproxen (aleve) - max dose up to 500mg every 12 hours (250mg if age over 72)  -  Opioid pain medication (oxycodone, hydrocodone, hydromorphone, etc)       - These are strongest but also have the most side effects. Use the smallest dose that is effective. - Do NOT take more than prescribed unless directed to do so. - As pain improves, take less pills and less often. Wean off ASAP       - Some narcotics such as percocet and vicodin also contain acetaminophen. - Please check the label to ensure you do not go over your max dose on acetaminophen       - Do not operate machinery while taking narcotic pain medications       - These medications all cause constipation. Take a stool softener as needed such as colace  - It is unrealistic to expect to be completely pain free after surgery. The goal is to get you to a manageable level where you are able to sleep and function during your day  - Please call me if you are struggling with acceptable pain control and we will troubleshoot    Follow up with me about a week after surgery for wound check and to start physical therapy    Dr. Austin June MD   [facility]  [address] [place] [address], [address], [address]  Office: [phone]  Cell: [phone]   [address] [place] [address]    There are potential side effects of anesthesia or sedation you may experience for the first 24 hours. These side effects include:    Confusion or Memory loss, Dizziness, or Delayed Reaction Times   [x]A responsible person should be with you for the next 24 hours. Do not operate any vehicles (automobiles, bicycles, motorcycles) or power tools or machinery for 24 hours. Do not sign any legal documents or make any legal decisions for 24 hours. Do not drink alcohol for 24 hours or while taking narcotic pain medication. Nausea    [x]Start with light diet and progress to your normal diet as you feel like eating.  However, if you experience nausea or repeated episodes of vomiting which persist beyond 12-24 hours, notify your physician. Once nausea has passed, remember to keep drinking fluids. Difficulty Passing Urine  [x]Drink extra amounts of fluid today. Notify your physician if you have not urinated within 8 hours after your procedure or you feel uncomfortable. Irritated Throat from a Breathing Tube  [x]Drink extra amounts of fluid today. Lozenges may help. Muscle Aches  [x]You may experience some generalized body aches as your muscles recover from medications used to relax them during surgery. These will gradually subside. MEDICATION INSTRUCTIONS:  [x]Prescription(S) x   3  sent with you. Use as directed. When taking pain medications, you may experience the side effect of dizziness or drowsiness. Do not drink alcohol or drive when taking these medications. []Prescription(S) x          Called to Pharmacy Name and location:    [x]Give the list of your medications to your primary care physician on your next visit. Keep your med list updated and carry it with in case of emergencies. [x] Narcotic pain medications can cause the side effect of significant constipation. You may want to add a stool softener to your postoperative medication schedule or speak to your surgeon on how best to manage this side effect. NARCOTIC SAFETY:  Your pain medicine is only for you to take. Safely store your medicines. Store pills up high and out of reach of children and pets. Ensure safety caps are snapped tightly  Keep track of how many pills you have left    Unused medication can be disposed of by taking them to a drop-off box or take-back program that is authorized by the [facility]. Access to a site near you can be found on the [facility] [employer] website ([address]. [facility].gov). If you have a CPAP machine, it is very important that you use it daily during all periods of sleep and daytime rest during your recovery at home.   Surgery and Anesthesia place a significant amount of stress on your body. Using your CPAP will help keep you safe and lessen the negative effects of that stress. FOLLOW-UP RECOVERY CARE:  [x]Call the office at  for follow-up appointment and problems    Watch for these possible complications, symptoms, or side effects of anesthesia. Call physician if they or any other problems occur:  Signs of INFECTION   > Fever over 101°     > Redness, swelling, hardness or warmth at the operative site   >Foul smelling or cloudy drainage at the operative site   Unrelieved PAIN  Unrelieved NAUSEA  Blood soaked dressing. (Some oozing may be normal)  Inability to urinate      Numb, pale, blue, cold or tingling extremity      Physician: The above instructions were reviewed with patient/significant other. The following additional patient specific information was reviewed with the patient/significant other:  [x]Procedure/physician specific instructions  []Medication information sheet(S) including potential side effects  []Jodis egress test  []Pain Ball management  []FAQ Catheter associated blood stream infections    FAQs  (frequently asked questions)  About Surgical Site Infections     What is a Surgical Site Infection (SSI)? A surgical site infection is an infection that occurs after surgery in the part of the body   where the surgery took place. Most patients who have surgery do not develop an   infection. However, infections develop in about 1 to 3 out of every 100 patients who  have surgery. Some common symptoms of a surgical site infection are:   Redness and pain around the area where you had surgery  Drainage of cloudy fluid from your surgical wound   Fever     Can SSIs be treated? Yes. Most surgical site infections can be treated with antibiotics. The antibiotic given to  you depends on the bacteria (germs) causing the infection. Sometimes patients with  SSIs also need another surgery to treat the infection. What are some of the things that hospitals are doing to prevent SSIs? To prevent SSIs, doctors, nurses and other healthcare providers:   Clean their hands and arms up to their elbows with an antiseptic agent just before the surgery. Clean their hands with soap and water or an alcohol-based hand rub before and after caring for each patient. May remove some of your hair immediately before your surgery using electric clippers if the hair is in the same area where the procedure will occur. They should not shave you with a razor. Wear special hair covers, masks, gowns, and gloves during surgery to keep the surgery area clean. Give you antibiotics before your surgery starts. In most cases, you should get antibiotics within 60 minutes before the surgery starts and the antibiotics should be stopped within 24 hours after surgery. Clean the skin at the site of your surgery with a special soap that kills germs. What can I do to help prevent SSIs? Before your surgery:  Tell your doctor about other medical problems you may have. Health problems such as allergies, diabetes, and obesity could affect your surgery and your treatment. Quit smoking. Patients who smoke get more infections. Talk to your doctor about how you can quit before your surgery. Do not shave near where you will have surgery. Shaving with a razor can irritate your skin and make it easier to develop an infection. At the time of your surgery:  Speak up if someone tries to shave you with a razor before surgery. Ask why you need to be shaved and talk with your surgeon if you have any concerns. Ask if you will get antibiotics before surgery. After your surgery:  Make sure that your healthcare providers clean their hands before examining you, either with soap and water or an alcohol-based hand rub. IF YOU DO NOT SEE YOUR PROVIDERS CLEAN THEIR HANDS, PLEASE ASK THEM TO DO SO.    Family and friends who visit you should not touch the surgical wound or dressings. Family and friends should clean their hands with soap and water or an alcohol-based hand rub before and after visiting you. If you do not see them clean their hands, ask them to clean their hands. What do I need to do when I go home from the hospital?  Before you go home, your doctor nurses should explain everything you need to know about taking care of your wound. Make sure you understand how to care for your wound before you leave the hospital.    Always clean your hands before and after caring for your wound. Before you go home, make sure you know who to contact if you have questions or problems after you get home. If you have any symptoms of an infection, such as redness and pain at the surgery site, drainage, or fever, call your doctor immediately. If you have additional questions, please ask your doctor or nurse. FAQ Surgical Site Infections  []Other-    I have read and understand the instructions given to me: ____________________________________________   (Patient/S.O. Signature)            Date/time 11/2/2022 2:59 PM         PACU:  [phone]   M-F 700 AM - 7 PM      SAME DAY SERVICES:  [phone] M-F 7AM-6PM        If you smoke STOP. We care about your health! Learning About How to Use Crutches  Overview  Crutches can help you walk when you have an injured hip, leg, knee, ankle, or foot. Your doctor will tell you how much weight--if any--you can put on your leg. Be sure your crutches fit you. When you stand up in your normal posture, there should be space for two or three fingers between the top of the crutch and your armpit. When you let your hands hang down, the hand  should be at your wrists. When you put your hands on the hand , your elbows should be slightly bent. To stay safe when using crutches:  Look straight ahead, not down at your feet.   Clear away small rugs, cords, or anything else that could cause you to trip, slip, or fall. Be very careful around pets and small children. They can get in your path when you least expect it. Be sure the rubber tips on your crutches are clean and in good condition to help prevent slipping. Avoid slick conditions, such as wet floors and snowy or icy driveways. In bad weather, be extra careful on curbs and steps. How to walk with crutches without weight on the injured leg     Keep the injured leg bent and off the ground. Set the crutches at arm's length in front of you. Don't lean forward to reach farther. Move your weak or injured leg forward, almost even with the crutches. Bend your elbows slightly. Press the padded top parts of the crutches against your sides, under your armpits. Push straight down on the handgrips as you bring your good leg up, so it is even with the weak or injured leg. Keep all the weight on your hands and not on your underarms. Repeat. When you are confident using the crutches, you can move the crutches and your injured leg at the same time. Then push straight down on the crutches as you step past the crutches with your strong leg, as you would in normal walking. How to walk with crutches with weight on the injured leg     Put both crutches about 12 inches in front of you. The crutches and your feet should form a triangle. Hold the crutches close enough to your body so you can push straight down on them, but leave room between the crutches for your body to pass through. Don't lean forward to reach farther. Put your weight on the handgrips, not on the pads under your arms. Constant pressure against your underarms can cause numbness. Move your weak or injured leg forward so it's almost even with the crutches. Bring your good leg up, so it's even with your weak or injured leg. Move your crutches about 12 inches in front of you, and start the next step.   When you're confident using the crutches, you can move the crutches and your injured leg at the same time. Then push straight down on the crutches as you step past the crutches with your strong leg, as you would in normal walking. How to sit down and stand up from a chair with crutches  To sit, back up to the chair. Use one hand to hold both crutches by the handgrips, beside your injured leg. With the other hand, hold onto the seat and slowly lower yourself onto the chair. Lay the crutches on the ground near your chair. If you prop them up, they may fall over. To get up from a chair,  the crutches and put them in one hand beside your injured leg. Put your weight on the handgrips of the crutches and on your strong leg to stand up. How to go up and down stairs using crutches  Try this first with another person nearby to steady you if needed. And remember \"up with the good, down with the bad\" to help you lead with the correct leg. If the stairs don't have a handrail, stand near the edge of the stairs. To go up, step up with your stronger leg. Then bring the crutches and your weak or injured leg to the upper step. To go down, put your crutches and your weak or injured leg on the lower step. Then bring your stronger leg down to the lower step. If the stairs have a sturdy handrail, stand near the edge of the stairs. Put both crutches under the arm opposite the handrail. Use the hand opposite the handrail to hold both crutches by the handgrips. Hold on to the handrail as you go up or down. To go up, step up with your stronger leg. Then bring the crutches and your weak or injured leg to the upper step. To go down,put your crutches and your weak or injured leg on the lower step. Then bring your stronger leg down to the lower step. Follow-up care is a key part of your treatment and safety. Be sure to make and go to all appointments, and call your doctor if you are having problems. It's also a good idea to know your test results and keep a list of the medicines you take.   Where can you learn more?  Go to [email or web address] and sign in to your [employer] account. Enter G235 in the [facility] box to learn more about \"Learning About How to Use Crutches. \"     If you do not have an account, please click on the \"Sign Up Now\" link. Current as of: March 9, 2022               Content Version: 13.4  © 1497-6059 Healthwise, Incorporated. Care instructions adapted under license by [facility] ([facility]). If you have questions about a medical condition or this instruction, always ask your healthcare professional. [address] any warranty or liability for your use of this information.

## 2022-11-02 NOTE — H&P
Luís Hinojosa MD  [place] Office: [address] [facility], [address], [address] ([address]) [place]      Chief Complaint:  Right knee pain    HPI:  61 y.o. male who presents with history of meniscus tear in the right knee refractory to conservative management who would like to undergo surgery. Pain is severe, acute, non-radiating, with associated mechanical symptoms, worse with bending and incompletely relieved with medications    He denies numbness, tingling, fevers, chills, chest pain, shortness of breath or any other acute symptoms      His health is otherwise significant for NO DM, CKD, GI ulcrs, DVT, Bleeding disorders, Stroke, anticoagulation, smoking or any other significant active cardiopulmonary comorbidities    Past Medical History:   Diagnosis Date    COVID-2022, residual cough    Hyperlipidemia     Hypertension     Medial meniscus tear     Pneumonia        Past Surgical History:   Procedure Laterality Date    APPENDECTOMY      COLONOSCOPY      MANDIBLE SURGERY         Social History     Tobacco Use    Smoking status: Former     Packs/day: 0.50     Years: 20.00     Pack years: 10.00     Types: Cigarettes     Quit date:      Years since quittin.8    Smokeless tobacco: Never   Substance Use Topics    Alcohol use: Yes     Alcohol/week: 6.0 standard drinks     Types: 6 Cans of beer per week     Comment: 1 beer daily    Drug use: No       family history includes Heart Attack (age of onset: 52) in his father; Heart Disease in his father; Heart Failure in his mother; Kidney Disease in his brother and mother. Prior to Admission medications    Medication Sig Start Date End Date Taking?  Authorizing Provider   aspirin EC 81 MG EC tablet Take 1 tablet by mouth 2 times daily 22  Yes Benjie Urbina MD   senna-docusate (PERICOLACE) 8.6-50 MG per tablet Take 2 tablets by mouth daily as needed for Constipation 22  Yes Benjie Urbina MD   oxyCODONE (ROXICODONE) 5 MG immediate release tablet Take 1 tablet by mouth every 6 hours as needed for Pain for up to 7 days. Intended supply: 7 days. Take lowest dose possible to manage pain 11/2/22 11/9/22 Yes Mariola Barton MD   diclofenac (VOLTAREN) 75 MG EC tablet Take by mouth 2 times daily 10/6/22  Yes Historical Provider, MD   amoxicillin (AMOXIL) 500 MG capsule Take 500 mg by mouth 3 times daily   Yes Historical Provider, MD   lisinopril (PRINIVIL;ZESTRIL) 10 MG tablet Take 1 tablet by mouth daily 9/12/22   Dee Dee Yepez MD   atorvastatin (LIPITOR) 40 MG tablet Take 1 tablet by mouth daily 9/8/22   SHERI Durham CNP   metoprolol succinate (TOPROL XL) 25 MG extended release tablet Take 1 tablet by mouth daily 9/7/22   SHERI Durham CNP       No Known Allergies     Review of Systems   Constitutional: Negative. HENT: Negative. Eyes: Negative. Respiratory: Negative. Cardiovascular: Negative. Gastrointestinal: Negative. Endocrine: Negative. Genitourinary: Negative. Musculoskeletal:  Positive for arthralgias. Skin: Negative. Allergic/Immunologic: Negative. Neurological: Negative. Hematological: Negative. Psychiatric/Behavioral: Negative.          Physical Examination:  BP (!) 149/91   Pulse 63   Temp 96.8 °F (36 °C) (Temporal)   Resp 14   Ht 6' (1.829 m)   Wt 247 lb 9.6 oz (112.3 kg)   SpO2 98%   BMI 33.58 kg/m²     Physical Exam    Gen: NAD, awake and alert  CV: normal S1/S2 no MRG  Resp: CTAB  Abd: Soft NTND  Neuro: Oriented, non-focal  Psych: Normal mood and affect    Right knee exam:  Skin: No rashes or significant lesions  Inspection: no No swelling lesions  Palpation: medial joint line ant patellofemoralTenderness  Range of motion: Full  Strength:  5/5 throughout  Stability:  No instability  Other: psoitive franko    Imaging:  X ray images of the left knee and MRI were independently reviewed and interpreted by me today and are significant for:  Posterior horn medial meniscus tear with possibly a radial component    Labs:    Lab Results   Component Value Date/Time    CREATININE 1.2 09/07/2022 01:48 PM    HGB 16.0 09/07/2022 01:48 PM         Assessment:  Right knee patellofemoral arthritis and medial meniscus tear    Plan:    - I counseled regarding risks, benefits and alternatives to right knee arthroscopy and partial meniscectomy vs. Meniscal repair, including bleeding, infection, damage to surrounding structures, need for future procedures. Stiffness post-op is common. Pain may continue, especially from his patellofemoral chondrosis. We also discussed alternatives including non-operative management. The patient elects to proceed with surgery.     MDM:  Acute worsening problem  Imaging independently reviewed and interpreted  Labs reviewed  Surgical management            Nola Mendez MD  [facility]  Office: [phone]  Cell: [phone]    11/02/22  12:27 PM

## 2023-02-18 ENCOUNTER — APPOINTMENT (OUTPATIENT)
Dept: GENERAL RADIOLOGY | Age: 58
DRG: 058 | End: 2023-02-18
Payer: COMMERCIAL

## 2023-02-18 ENCOUNTER — APPOINTMENT (OUTPATIENT)
Dept: CT IMAGING | Age: 58
DRG: 058 | End: 2023-02-18
Payer: COMMERCIAL

## 2023-02-18 ENCOUNTER — HOSPITAL ENCOUNTER (INPATIENT)
Age: 58
LOS: 2 days | Discharge: HOME | DRG: 058 | End: 2023-02-20
Attending: EMERGENCY MEDICINE | Admitting: FAMILY MEDICINE
Payer: COMMERCIAL

## 2023-02-18 DIAGNOSIS — R20.0 LEFT ARM NUMBNESS: ICD-10-CM

## 2023-02-18 DIAGNOSIS — I63.9 CEREBROVASCULAR ACCIDENT (CVA), UNSPECIFIED MECHANISM (HCC): Primary | ICD-10-CM

## 2023-02-18 PROBLEM — R20.2 PARESTHESIAS: Status: ACTIVE | Noted: 2023-02-18

## 2023-02-18 PROBLEM — R20.2 PARESTHESIA: Status: ACTIVE | Noted: 2023-02-18

## 2023-02-18 PROBLEM — I48.91 A-FIB (HCC): Status: ACTIVE | Noted: 2023-02-18

## 2023-02-18 LAB
A/G RATIO: 1.4 (ref 1.1–2.2)
ALBUMIN SERPL-MCNC: 4.1 G/DL (ref 3.4–5)
ALP BLD-CCNC: 73 U/L (ref 40–129)
ALT SERPL-CCNC: 13 U/L (ref 10–40)
ANION GAP SERPL CALCULATED.3IONS-SCNC: 8 MMOL/L (ref 3–16)
AST SERPL-CCNC: 19 U/L (ref 15–37)
BASOPHILS ABSOLUTE: 0.1 K/UL (ref 0–0.2)
BASOPHILS RELATIVE PERCENT: 1.1 %
BILIRUB SERPL-MCNC: 0.3 MG/DL (ref 0–1)
BUN BLDV-MCNC: 12 MG/DL (ref 7–20)
CALCIUM SERPL-MCNC: 9.1 MG/DL (ref 8.3–10.6)
CHLORIDE BLD-SCNC: 107 MMOL/L (ref 99–110)
CHP ED QC CHECK: YES
CO2: 24 MMOL/L (ref 21–32)
CREAT SERPL-MCNC: 1 MG/DL (ref 0.9–1.3)
EKG ATRIAL RATE: 67 BPM
EKG DIAGNOSIS: NORMAL
EKG P AXIS: 45 DEGREES
EKG P-R INTERVAL: 136 MS
EKG Q-T INTERVAL: 418 MS
EKG QRS DURATION: 98 MS
EKG QTC CALCULATION (BAZETT): 441 MS
EKG R AXIS: -7 DEGREES
EKG T AXIS: 6 DEGREES
EKG VENTRICULAR RATE: 67 BPM
EOSINOPHILS ABSOLUTE: 0.5 K/UL (ref 0–0.6)
EOSINOPHILS RELATIVE PERCENT: 7.2 %
GFR SERPL CREATININE-BSD FRML MDRD: >60 ML/MIN/{1.73_M2}
GLUCOSE BLD-MCNC: 91 MG/DL
GLUCOSE BLD-MCNC: 91 MG/DL (ref 70–99)
GLUCOSE BLD-MCNC: 98 MG/DL (ref 70–99)
HCT VFR BLD CALC: 43.7 % (ref 40.5–52.5)
HEMOGLOBIN: 14.8 G/DL (ref 13.5–17.5)
INR BLD: 0.92 (ref 0.87–1.14)
LYMPHOCYTES ABSOLUTE: 1.5 K/UL (ref 1–5.1)
LYMPHOCYTES RELATIVE PERCENT: 22.6 %
MCH RBC QN AUTO: 29.1 PG (ref 26–34)
MCHC RBC AUTO-ENTMCNC: 33.8 G/DL (ref 31–36)
MCV RBC AUTO: 85.9 FL (ref 80–100)
MONOCYTES ABSOLUTE: 0.5 K/UL (ref 0–1.3)
MONOCYTES RELATIVE PERCENT: 7.8 %
NEUTROPHILS ABSOLUTE: 4 K/UL (ref 1.7–7.7)
NEUTROPHILS RELATIVE PERCENT: 61.3 %
PDW BLD-RTO: 14.7 % (ref 12.4–15.4)
PERFORMED ON: NORMAL
PLATELET # BLD: 213 K/UL (ref 135–450)
PMV BLD AUTO: 7.7 FL (ref 5–10.5)
POTASSIUM REFLEX MAGNESIUM: 4.6 MMOL/L (ref 3.5–5.1)
PROTHROMBIN TIME: 12.3 SEC (ref 11.7–14.5)
RBC # BLD: 5.09 M/UL (ref 4.2–5.9)
SODIUM BLD-SCNC: 139 MMOL/L (ref 136–145)
TOTAL PROTEIN: 7 G/DL (ref 6.4–8.2)
TROPONIN: <0.01 NG/ML
WBC # BLD: 6.5 K/UL (ref 4–11)

## 2023-02-18 PROCEDURE — 6370000000 HC RX 637 (ALT 250 FOR IP): Performed by: EMERGENCY MEDICINE

## 2023-02-18 PROCEDURE — 2580000003 HC RX 258: Performed by: INTERNAL MEDICINE

## 2023-02-18 PROCEDURE — 70496 CT ANGIOGRAPHY HEAD: CPT

## 2023-02-18 PROCEDURE — 36415 COLL VENOUS BLD VENIPUNCTURE: CPT

## 2023-02-18 PROCEDURE — 85610 PROTHROMBIN TIME: CPT

## 2023-02-18 PROCEDURE — 6360000002 HC RX W HCPCS: Performed by: FAMILY MEDICINE

## 2023-02-18 PROCEDURE — 6370000000 HC RX 637 (ALT 250 FOR IP): Performed by: FAMILY MEDICINE

## 2023-02-18 PROCEDURE — 93005 ELECTROCARDIOGRAM TRACING: CPT | Performed by: EMERGENCY MEDICINE

## 2023-02-18 PROCEDURE — 84484 ASSAY OF TROPONIN QUANT: CPT

## 2023-02-18 PROCEDURE — 80053 COMPREHEN METABOLIC PANEL: CPT

## 2023-02-18 PROCEDURE — 6360000004 HC RX CONTRAST MEDICATION: Performed by: EMERGENCY MEDICINE

## 2023-02-18 PROCEDURE — 70450 CT HEAD/BRAIN W/O DYE: CPT

## 2023-02-18 PROCEDURE — G0378 HOSPITAL OBSERVATION PER HR: HCPCS

## 2023-02-18 PROCEDURE — 99285 EMERGENCY DEPT VISIT HI MDM: CPT

## 2023-02-18 PROCEDURE — 2500000003 HC RX 250 WO HCPCS: Performed by: INTERNAL MEDICINE

## 2023-02-18 PROCEDURE — 71045 X-RAY EXAM CHEST 1 VIEW: CPT

## 2023-02-18 PROCEDURE — 85025 COMPLETE CBC W/AUTO DIFF WBC: CPT

## 2023-02-18 PROCEDURE — 2060000000 HC ICU INTERMEDIATE R&B

## 2023-02-18 PROCEDURE — 93010 ELECTROCARDIOGRAM REPORT: CPT | Performed by: INTERNAL MEDICINE

## 2023-02-18 RX ORDER — ENOXAPARIN SODIUM 100 MG/ML
30 INJECTION SUBCUTANEOUS 2 TIMES DAILY
Status: DISCONTINUED | OUTPATIENT
Start: 2023-02-18 | End: 2023-02-20 | Stop reason: HOSPADM

## 2023-02-18 RX ORDER — DILTIAZEM HYDROCHLORIDE 5 MG/ML
5 INJECTION INTRAVENOUS ONCE
Status: COMPLETED | OUTPATIENT
Start: 2023-02-18 | End: 2023-02-18

## 2023-02-18 RX ORDER — ONDANSETRON 2 MG/ML
4 INJECTION INTRAMUSCULAR; INTRAVENOUS EVERY 6 HOURS PRN
Status: DISCONTINUED | OUTPATIENT
Start: 2023-02-18 | End: 2023-02-20 | Stop reason: HOSPADM

## 2023-02-18 RX ORDER — ASPIRIN 81 MG/1
81 TABLET ORAL DAILY
Status: DISCONTINUED | OUTPATIENT
Start: 2023-02-18 | End: 2023-02-20 | Stop reason: HOSPADM

## 2023-02-18 RX ORDER — ONDANSETRON 4 MG/1
4 TABLET, ORALLY DISINTEGRATING ORAL EVERY 8 HOURS PRN
Status: DISCONTINUED | OUTPATIENT
Start: 2023-02-18 | End: 2023-02-20 | Stop reason: HOSPADM

## 2023-02-18 RX ORDER — ENOXAPARIN SODIUM 100 MG/ML
40 INJECTION SUBCUTANEOUS DAILY
Status: DISCONTINUED | OUTPATIENT
Start: 2023-02-18 | End: 2023-02-18 | Stop reason: DRUGHIGH

## 2023-02-18 RX ORDER — BUSPIRONE HYDROCHLORIDE 5 MG/1
5 TABLET ORAL 2 TIMES DAILY
Status: DISCONTINUED | OUTPATIENT
Start: 2023-02-18 | End: 2023-02-20 | Stop reason: HOSPADM

## 2023-02-18 RX ORDER — POLYETHYLENE GLYCOL 3350 17 G/17G
17 POWDER, FOR SOLUTION ORAL DAILY PRN
Status: DISCONTINUED | OUTPATIENT
Start: 2023-02-18 | End: 2023-02-20 | Stop reason: HOSPADM

## 2023-02-18 RX ORDER — ASPIRIN 300 MG/1
300 SUPPOSITORY RECTAL DAILY
Status: DISCONTINUED | OUTPATIENT
Start: 2023-02-18 | End: 2023-02-20 | Stop reason: HOSPADM

## 2023-02-18 RX ORDER — ATORVASTATIN CALCIUM 80 MG/1
80 TABLET, FILM COATED ORAL NIGHTLY
Status: DISCONTINUED | OUTPATIENT
Start: 2023-02-18 | End: 2023-02-20 | Stop reason: HOSPADM

## 2023-02-18 RX ORDER — ACETAMINOPHEN 325 MG/1
650 TABLET ORAL ONCE
Status: COMPLETED | OUTPATIENT
Start: 2023-02-18 | End: 2023-02-18

## 2023-02-18 RX ADMIN — ASPIRIN 81 MG: 81 TABLET, COATED ORAL at 18:22

## 2023-02-18 RX ADMIN — ATORVASTATIN CALCIUM 80 MG: 80 TABLET, FILM COATED ORAL at 20:43

## 2023-02-18 RX ADMIN — DILTIAZEM HYDROCHLORIDE 5 MG: 5 INJECTION, SOLUTION INTRAVENOUS at 20:51

## 2023-02-18 RX ADMIN — ACETAMINOPHEN 650 MG: 325 TABLET ORAL at 15:37

## 2023-02-18 RX ADMIN — DILTIAZEM HYDROCHLORIDE 5 MG/HR: 5 INJECTION, SOLUTION INTRAVENOUS at 20:58

## 2023-02-18 RX ADMIN — ENOXAPARIN SODIUM 30 MG: 100 INJECTION SUBCUTANEOUS at 20:51

## 2023-02-18 RX ADMIN — IOPAMIDOL 100 ML: 755 INJECTION, SOLUTION INTRAVENOUS at 11:10

## 2023-02-18 ASSESSMENT — PAIN DESCRIPTION - DESCRIPTORS: DESCRIPTORS: ACHING

## 2023-02-18 ASSESSMENT — PAIN DESCRIPTION - LOCATION: LOCATION: HEAD

## 2023-02-18 ASSESSMENT — ENCOUNTER SYMPTOMS
ABDOMINAL PAIN: 0
SHORTNESS OF BREATH: 0

## 2023-02-18 ASSESSMENT — PAIN SCALES - GENERAL
PAINLEVEL_OUTOF10: 2
PAINLEVEL_OUTOF10: 0

## 2023-02-18 NOTE — ED NOTES
Patient reports equal sensation to all extremities and bilateral face during RN NIHSS assessment at 1002. Patient then reports mild change in sensation to MD during provider NIHSS assessment at 1015. MD aware of change.      Mis Li RN  02/18/23 1026

## 2023-02-18 NOTE — ED NOTES
ED SBAR report provider to Travis Schroeder RN. Patient to be transported to Room 5268 via stretcher by  [facility] transport .      Dez Vail RN  02/18/23 2193

## 2023-02-18 NOTE — PROGRESS NOTES
NAME:  Naun Puente  YOB: 1965  MEDICAL RECORD NUMBER:  [id number]  TODAYS DATE:  2/18/2023    Discussed personal risk factors for Stroke/TIA with patient/family, and ways to reduce the risk for a recurrent stroke. Patient's personal risk factors which were identified are:     [x] Alcohol Abuse: check with your physician before any alcohol consumption. [] Atrial fibrillation: may cause blood clots. [] Drug Abuse: Seek help, talk with your doctor  [] Clotting Disorder  [] Diabetes  [] Family history of stroke or heart disease  [x] High Blood Pressure/Hypertension: work with your physician. [x] High cholesterol: monitor cholesterol levels with your physician. [x] Overweight/Obesity: work with your physician for your ideal body weight. [x] Physical Inactivity: get regular exercise as directed by your physician. [] Personal history of previous TIA or stroke  [x] Poor Diet; decrease salt (sodium) in your diet, follow diet directed by physician. [] Smoking: Cigarette/Cigar: stop smoking. Advised pt. that you can reduce your risk for stroke/TIA by modifying/controlling the risk factors that you have. Pt.advised to take the medications as prescribed, which will be detailed in the discharge instructions, and to not stop taking them without consulting their physician. In addition, pt. advised to maintain a healthy diet, exercise regularly and to not smoke. [facility]'s Stroke treatment and prevention, Managing your recovery  notebook  provided and/or reviewed  with patient/family. The notebook includes, but not limited to, sections addressing warning signs & symptoms of a stroke, which are: sudden numbness or weakness especially on one side of the body, sudden confusion, difficulty speaking or understanding, sudden changes in vision, sudden dizziness or loss of balance/ coordination, sudden severe headache, syncope and seizure.   The need to call EMS ([phone]) immediately if signs & symptoms occur is emphasized . The notebook also provides education on Stroke community resources and stroke advocacy. The need for follow-up after discharge was highlighted with patient/family with them being able to repeat understanding of the importance of this.       Electronically signed by Perry Adame RN on 2/18/2023 at 6:18 PM

## 2023-02-18 NOTE — ED NOTES
MD aware of ongoing and trending hypertension. No additional orders at this time.      Joselin Zamarripa, RN  02/18/23 3303

## 2023-02-18 NOTE — ED TRIAGE NOTES
Nursing triage performed by Presley Murillo under supervision of this RN. RN Note: Patient presents to ED complaining of numbness and weakness to left arm which started 4 days ago. Patient also reports some intermittent numbness to bilateral feet and ringing in both ears. Denies hx of stroke, denies hx of cardiac problems. Patient states he has noticed his blood pressure has been high at home and has been doubling up on his hypertension medications without results. Patient resting on bed, respirations even and easy at this time. No obvious distress.

## 2023-02-18 NOTE — ED TRIAGE NOTES
Pt presents to ED with numbness and weakness in left arm and both feet x4days and ringing in both ears. Intermittent numbness in both feet. Pt states hx of borderline diabetes. BS 94 on arrival    Pt states hx of HTN. Pt states /100 at home prior to arrival.    Pt denies hx of prior stroke or cardiac hx. Patient resting on bed, respirations even and easy at this time. No obvious distress.

## 2023-02-18 NOTE — ED NOTES
Patient requesting food and drink. Per MD, patient should remain NPO except for sips with medications until seen at [facility]. Patient verbalizes understanding.      Eliel Courtney RN  02/18/23 0503

## 2023-02-18 NOTE — ED NOTES
Report given to Tasha Galan RN at this time, all questions answered. Denies any further questions at this time.      Radhames Aguilar RN  02/18/23 0113

## 2023-02-18 NOTE — PLAN OF CARE
Problem: Discharge Planning  Goal: Discharge to home or other facility with appropriate resources  Outcome: Progressing  Flowsheets (Taken 2/18/2023 171)  Discharge to home or other facility with appropriate resources:   Identify barriers to discharge with patient and caregiver   Arrange for needed discharge resources and transportation as appropriate   Identify discharge learning needs (meds, wound care, etc)     Problem: Pain  Goal: Verbalizes/displays adequate comfort level or baseline comfort level  Outcome: Progressing     Problem: Safety - Adult  Goal: Free from fall injury  Outcome: Progressing

## 2023-02-18 NOTE — PROGRESS NOTES
Patient here from [facility] ED via stretcher. Heart monitor placed. On tele. VSS. Patient oriented to unit and room. Admit assessment to follow and history obtained. Orders reviewed with patient and POC discussed. Call light in reach. Bed in lowest position, bed alarm on. Denies other needs. Will continue to monitor. Electronically signed by Luis Armando Greene RN on 2/18/2023 at 5:16 PM    Pt brought in own BP cuff and checked in left upper arm reading 183/106. Electronically signed by Luis Armando Greene RN on 2/18/2023 at 5:17 PM    Upon arrival, EMS stated during ride over from [place], he amanda'd into 45s, then jumped into 140s, remained asymptomatic but stated he felt a flutter in his chest. EMS placed copy of this in his paper chart. During admission assessment, pt jumped up to 126 with 25 beats of PAT, pt again stated he felt the intermittent flutter in his chest. RN notified Dr Janusz Blount, received new orders for cardio consult. Electronically signed by Luis Armando Greene RN on 2/18/2023 at 6:17 PM    [id number]- Pt flipped into a-fib, cardio consult has been called. Hospitalist aware. 1846- Received call from Dr Anthony Hoover, new orders received for cardizem 5mg bolus, then start drip at 5mg/hr. Get hospitalist approval for weight based heparin. RN spoke with Dr Janusz Blount, she is agreeable to heparin orders. Orders to follow.   Electronically signed by Luis Armando Greene RN on 2/18/2023 at 7:13 PM

## 2023-02-18 NOTE — ED PROVIDER NOTES
[facility] ENCOUNTER      Pt Name: Ritesh Batres  MRN: [id number]  [place] 1965  Date of evaluation: 2/18/2023  Provider: Tonio Gonzalez MD    [address]       Chief Complaint   Patient presents with    Numbness     Pt complains of numbness and weakness in left arm and both feet x4days and ringing in both ears with intermittent numbness in both feet. . Pt states hx of borderline diabetes BS 94 on arrival. Pt states /100 at home. Hx of HTN. HISTORY OF PRESENT ILLNESS   (Location/Symptom, Timing/Onset, Context/Setting, Quality, Duration, Modifying Factors, Severity)  Note limiting factors. Ritesh Batres is a 61 y.o. male who presents to the emergency department with left arm numbness for 3 days    HPI    This is a pleasant 58-year-old  gentleman with history of hypertension who presents with left arm numbness on and off for about 3 days. He also had a brief episode of bilateral lower extremity numbness which resolved. However the left arm numbness persist.  No aggravating relieving factors and essentially is the entire arm radiating to all the fingers. No headaches no visual changes no double vision or blurry vision or the other focal neurologic symptoms. His last known normal was more than 3 days ago. Nursing Notes were reviewed. REVIEW OF SYSTEMS    (2-9 systems for level 4, 10 or more for level 5)     Review of Systems   Constitutional:  Negative for chills and fever. Eyes:  Negative for visual disturbance. Respiratory:  Negative for shortness of breath. Cardiovascular:  Negative for chest pain. Gastrointestinal:  Negative for abdominal pain. Neurological:  Positive for numbness. Negative for speech difficulty, weakness and headaches. All other systems reviewed and are negative. Except as noted above the remainder of the review of systems was reviewed and negative.        PAST MEDICAL HISTORY Past Medical History:   Diagnosis Date    COVID-2022, residual cough    Hyperlipidemia     Hypertension     Medial meniscus tear     Pneumonia          SURGICAL HISTORY       Past Surgical History:   Procedure Laterality Date    APPENDECTOMY      COLONOSCOPY      KNEE ARTHROSCOPY Right 2022    RIGHT KNEE ARTHROSCOPY WITH MEDIAL MENISCUS REPAIR performed by Steph Rodriguez MD at [place] [address]       Previous Medications    ASPIRIN EC 81 MG EC TABLET    Take 1 tablet by mouth 2 times daily    ATORVASTATIN (LIPITOR) 40 MG TABLET    Take 1 tablet by mouth daily    BUSPIRONE (BUSPAR) 5 MG TABLET    Take 1 tablet by mouth 2 times daily    GUAIFENESIN (MUCINEX) 600 MG EXTENDED RELEASE TABLET    Take 2 tablets by mouth 2 times daily as needed for Congestion    LISINOPRIL (PRINIVIL;ZESTRIL) 20 MG TABLET    Take 1 tablet by mouth daily       ALLERGIES     Patient has no known allergies. FAMILY HISTORY       Family History   Problem Relation Age of Onset    Kidney Disease Mother     Heart Failure Mother     Heart Attack Father 52    Heart Disease Father     Kidney Disease Brother           SOCIAL HISTORY       Social History     Socioeconomic History    Marital status:      Spouse name: None    Number of children: None    Years of education: None    Highest education level: None   Tobacco Use    Smoking status: Former     Packs/day: 0.50     Years: 20.00     Pack years: 10.00     Types: Cigarettes     Quit date:      Years since quittin.1    Smokeless tobacco: Never   Substance and Sexual Activity    Alcohol use:  Yes     Alcohol/week: 6.0 standard drinks     Types: 6 Cans of beer per week     Comment: 1 beer daily    Drug use: No     Social Determinants of Health     Financial Resource Strain: Low Risk     Difficulty of Paying Living Expenses: Not hard at all   Food Insecurity: No Food Insecurity    Worried About Running Out of Food in the Last Year: Never true    Ran Out of Food in the Last Year: Never true       SCREENINGS   NIH Stroke Scale  Interval: Baseline  Level of Consciousness (1a): Alert  LOC Questions (1b): Answers both correctly  LOC Commands (1c): Performs both tasks correctly  Best Gaze (2): Normal  Visual (3): No visual loss  Facial Palsy (4): Normal symmetrical movement  Motor Arm, Left (5a): No drift  Motor Arm, Right (5b): No drift  Motor Leg, Left (6a): No drift  Motor Leg, Right (6b): No drift  Limb Ataxia (7): Absent  Sensory (8): (!) Mild to Moderate  Best Language (9): No aphasia  Dysarthria (10): Normal  Extinction and Inattention (11): No abnormality  Total: 1     [place] Coma Scale  Eye Opening: Spontaneous  Best Verbal Response: Oriented  Best Motor Response: Obeys commands  [place] Coma Scale Score: 15                     CIWA Assessment  BP: (!) 167/102  Heart Rate: 67                 PHYSICAL EXAM    (up to 7 for level 4, 8 or more for level 5)     ED Triage Vitals   BP Temp Temp src Pulse Resp SpO2 Height Weight   -- -- -- -- -- -- -- --       Physical Exam  Constitutional:       General: He is not in acute distress. Appearance: Normal appearance. HENT:      Head: Normocephalic and atraumatic. Right Ear: Tympanic membrane and ear canal normal.      Left Ear: Tympanic membrane and ear canal normal.      Nose: Nose normal. No congestion. Mouth/Throat:      Mouth: Mucous membranes are moist.      Pharynx: No oropharyngeal exudate. Eyes:      Extraocular Movements: Extraocular movements intact. Pupils: Pupils are equal, round, and reactive to light. Cardiovascular:      Rate and Rhythm: Normal rate and regular rhythm. Heart sounds: Normal heart sounds. Pulmonary:      Effort: Pulmonary effort is normal.      Breath sounds: Normal breath sounds. Abdominal:      Palpations: Abdomen is soft. Tenderness: There is no abdominal tenderness.    Musculoskeletal:         General: Normal range of motion. Cervical back: Normal range of motion and neck supple. Skin:     General: Skin is warm. Capillary Refill: Capillary refill takes less than 2 seconds. Neurological:      Mental Status: He is alert and oriented to person, place, and time. Psychiatric:         Mood and Affect: Mood normal.         Behavior: Behavior normal.       DIAGNOSTIC RESULTS     EKG: All EKG's are interpreted by the Emergency Department Physician who either signs or Co-signs this chart in the absence of a cardiologist.    EKG Interpretation    Interpreted by emergency department physician    Rhythm: normal sinus   Rate: normal  Axis: normal  Ectopy: none  Conduction: normal  ST Segments: normal  T Waves: normal  Q Waves: none    Clinical Impression: no acute changes, normal EKG as read by myself    Jennifer Arango MD     RADIOLOGY:   Non-plain film images such as CT, Ultrasound and MRI are read by the radiologist. Plain radiographic images are visualized and preliminarily interpreted by the emergency physician with the below findings:        Interpretation per the Radiologist below, if available at the time of this note:    CTA HEAD NECK W CONTRAST   Final Result   Unremarkable CTA of the head and neck. CT HEAD WO CONTRAST   Final Result   No acute intracranial abnormality. Paranasal sinus disease         XR CHEST 1 VIEW   Final Result   No significant findings in the chest.               ED BEDSIDE ULTRASOUND:   Performed by ED Physician - none    LABS:  Labs Reviewed   POCT GLUCOSE - Normal   CBC WITH AUTO DIFFERENTIAL   COMPREHENSIVE METABOLIC PANEL W/ REFLEX TO MG FOR LOW K   TROPONIN   PROTIME-INR   POCT GLUCOSE       All other labs were within normal range or not returned as of this dictation.     EMERGENCY DEPARTMENT COURSE and DIFFERENTIAL DIAGNOSIS/MDM:   Vitals:    Vitals:    02/18/23 1000 02/18/23 1030 02/18/23 1145 02/18/23 1158   BP: (!) 158/108 (!) 150/97  (!) 167/102   Pulse: 71 66 64 67   Resp: 19 11 23 23   Temp: 97.8 °F (36.6 °C)      TempSrc: Oral      SpO2: 98% 99% 100% 100%       The above history and physical exam were performed. I reviewed his past medical past surgical social family history. A 12 point review is performed for this patient and is negative as otherwise noted. He has an NIH stroke scale of 1. Stroke team was not consulted given the somewhat subacute nature of his symptoms which started 3 days ago possibly 4. His neuroimaging was unremarkable. Medical Decision Making  Amount and/or Complexity of Data Reviewed  Labs: ordered. Radiology: ordered. ECG/medicine tests: ordered. Risk  OTC drugs. Prescription drug management. Decision regarding hospitalization. I considered the diagnosis of acute CVA versus intracranial hemorrhage versus subacute CVA. At this point in time given his persistent NIH stroke scale of 1 with left upper extremity numbness this is likely a subacute CVA and not an LVO as his CTA of head and neck was unremarkable. He certainly merits admission for further work-up and evaluation although fortunately his work-up to date is unremarkable. REASSESSMENT          CRITICAL CARE TIME   Total Critical Care time was  minutes, excluding separately reportable procedures. There was a high probability of clinically significant/life threatening deterioration in the patient's condition which required my urgent intervention. CONSULTS:  IP CONSULT TO PHARMACY  PHARMACY TO CHANGE BASE FLUIDS    PROCEDURES:  Unless otherwise noted below, none     Procedures        FINAL IMPRESSION      1. Cerebrovascular accident (CVA), unspecified mechanism ([address]    2. Left arm numbness          DISPOSITION/PLAN   DISPOSITION Decision To Admit 02/18/2023 12:05:10 PM      PATIENT REFERRED TO:  No follow-up provider specified. DISCHARGE MEDICATIONS:  New Prescriptions    No medications on file     Controlled Substances Monitoring:     No flowsheet data found.     (Please note that portions of this note were completed with a voice recognition program.  Efforts were made to edit the dictations but occasionally words are mis-transcribed.)    Osiel Weber MD (electronically signed)  Attending Emergency Physician          Osiel Weber MD  02/18/23 1156       Osiel Weber MD  02/18/23 3594

## 2023-02-19 LAB
CHOLESTEROL, TOTAL: 122 MG/DL (ref 0–199)
ESTIMATED AVERAGE GLUCOSE: 102.5 MG/DL
HBA1C MFR BLD: 5.2 %
HCT VFR BLD CALC: 43 % (ref 40.5–52.5)
HDLC SERPL-MCNC: 29 MG/DL (ref 40–60)
HEMOGLOBIN: 14.6 G/DL (ref 13.5–17.5)
LDL CHOLESTEROL CALCULATED: 47 MG/DL
MCH RBC QN AUTO: 29.5 PG (ref 26–34)
MCHC RBC AUTO-ENTMCNC: 33.9 G/DL (ref 31–36)
MCV RBC AUTO: 86.9 FL (ref 80–100)
PDW BLD-RTO: 14.6 % (ref 12.4–15.4)
PLATELET # BLD: 200 K/UL (ref 135–450)
PMV BLD AUTO: 8.2 FL (ref 5–10.5)
RBC # BLD: 4.95 M/UL (ref 4.2–5.9)
TRIGL SERPL-MCNC: 228 MG/DL (ref 0–150)
VLDLC SERPL CALC-MCNC: 46 MG/DL
WBC # BLD: 6.7 K/UL (ref 4–11)

## 2023-02-19 PROCEDURE — 6370000000 HC RX 637 (ALT 250 FOR IP): Performed by: FAMILY MEDICINE

## 2023-02-19 PROCEDURE — 97530 THERAPEUTIC ACTIVITIES: CPT

## 2023-02-19 PROCEDURE — 83036 HEMOGLOBIN GLYCOSYLATED A1C: CPT

## 2023-02-19 PROCEDURE — 92610 EVALUATE SWALLOWING FUNCTION: CPT

## 2023-02-19 PROCEDURE — 92523 SPEECH SOUND LANG COMPREHEN: CPT

## 2023-02-19 PROCEDURE — 6370000000 HC RX 637 (ALT 250 FOR IP): Performed by: NURSE PRACTITIONER

## 2023-02-19 PROCEDURE — 99254 IP/OBS CNSLTJ NEW/EST MOD 60: CPT | Performed by: STUDENT IN AN ORGANIZED HEALTH CARE EDUCATION/TRAINING PROGRAM

## 2023-02-19 PROCEDURE — 2060000000 HC ICU INTERMEDIATE R&B

## 2023-02-19 PROCEDURE — 80061 LIPID PANEL: CPT

## 2023-02-19 PROCEDURE — 6360000002 HC RX W HCPCS: Performed by: FAMILY MEDICINE

## 2023-02-19 PROCEDURE — 85027 COMPLETE CBC AUTOMATED: CPT

## 2023-02-19 PROCEDURE — 36415 COLL VENOUS BLD VENIPUNCTURE: CPT

## 2023-02-19 PROCEDURE — 97165 OT EVAL LOW COMPLEX 30 MIN: CPT

## 2023-02-19 PROCEDURE — 97161 PT EVAL LOW COMPLEX 20 MIN: CPT

## 2023-02-19 RX ORDER — LISINOPRIL 20 MG/1
20 TABLET ORAL DAILY
Status: DISCONTINUED | OUTPATIENT
Start: 2023-02-20 | End: 2023-02-20 | Stop reason: HOSPADM

## 2023-02-19 RX ORDER — ACETAMINOPHEN 325 MG/1
650 TABLET ORAL EVERY 4 HOURS PRN
Status: DISCONTINUED | OUTPATIENT
Start: 2023-02-19 | End: 2023-02-20 | Stop reason: HOSPADM

## 2023-02-19 RX ORDER — METOPROLOL SUCCINATE 25 MG/1
25 TABLET, EXTENDED RELEASE ORAL DAILY
COMMUNITY
End: 2023-02-22 | Stop reason: SDUPTHER

## 2023-02-19 RX ORDER — DILTIAZEM HYDROCHLORIDE 120 MG/1
120 CAPSULE, COATED, EXTENDED RELEASE ORAL DAILY
Status: DISCONTINUED | OUTPATIENT
Start: 2023-02-19 | End: 2023-02-20 | Stop reason: HOSPADM

## 2023-02-19 RX ORDER — MELOXICAM 7.5 MG/1
7.5 TABLET ORAL PRN
COMMUNITY

## 2023-02-19 RX ORDER — METOPROLOL SUCCINATE 25 MG/1
25 TABLET, EXTENDED RELEASE ORAL DAILY
Status: DISCONTINUED | OUTPATIENT
Start: 2023-02-20 | End: 2023-02-20 | Stop reason: HOSPADM

## 2023-02-19 RX ADMIN — ATORVASTATIN CALCIUM 80 MG: 80 TABLET, FILM COATED ORAL at 20:45

## 2023-02-19 RX ADMIN — ACETAMINOPHEN 650 MG: 325 TABLET ORAL at 20:45

## 2023-02-19 RX ADMIN — ASPIRIN 81 MG: 81 TABLET, COATED ORAL at 09:19

## 2023-02-19 RX ADMIN — BUSPIRONE HYDROCHLORIDE 5 MG: 5 TABLET ORAL at 20:45

## 2023-02-19 RX ADMIN — ENOXAPARIN SODIUM 30 MG: 100 INJECTION SUBCUTANEOUS at 09:19

## 2023-02-19 RX ADMIN — DILTIAZEM HYDROCHLORIDE 120 MG: 120 CAPSULE, EXTENDED RELEASE ORAL at 17:24

## 2023-02-19 RX ADMIN — BUSPIRONE HYDROCHLORIDE 5 MG: 5 TABLET ORAL at 09:19

## 2023-02-19 RX ADMIN — ENOXAPARIN SODIUM 30 MG: 100 INJECTION SUBCUTANEOUS at 20:45

## 2023-02-19 ASSESSMENT — PAIN DESCRIPTION - DESCRIPTORS: DESCRIPTORS: ACHING

## 2023-02-19 ASSESSMENT — PAIN SCALES - GENERAL
PAINLEVEL_OUTOF10: 0
PAINLEVEL_OUTOF10: 3

## 2023-02-19 ASSESSMENT — PAIN DESCRIPTION - LOCATION: LOCATION: TEETH

## 2023-02-19 NOTE — ACP (ADVANCE CARE PLANNING)
Advance Care Planning     Advance Care Planning Inpatient Note  [facility]    Today's Date: 2/19/2023  Unit: [facility] PROGRESSIVE CARE    Received request from [employer] [employer]. Upon review of chart and communication with care team, patient's decision making abilities are not in question. . Patient was/were present in the room during visit. Goals of ACP Conversation:  Discuss advance care planning documents    Health Care Decision Makers:     No healthcare decision makers have been documented. Click here to complete [address] including selection of the Healthcare Decision Maker Relationship (ie \"Primary\")  Summary:  No Decision Maker named by patient at this time    Advance Care Planning Documents (Patient Wishes):  None     Assessment:   provided education on Adv. Directives. Mr. Don Solomon is not interest in designating a HCPOA or completing a Living Will. Blank documents given with instructions on how to complete independently.     Interventions:  Discussed and provided education on state decision maker hierarchy  Encouraged ongoing ACP conversation with future decision makers and loved ones    Care Preferences Communicated:   No    Outcomes/Plan:  ACP Discussion: Completed    Electronically signed by Mary Del Real, [employer] on 2/19/2023 at 3:12 PM

## 2023-02-19 NOTE — PROGRESS NOTES
Facility/Department: [address] CARE  SLP Clinical Swallow Evaluation and Speech Language Cognitive Assessment     Patient: Jagjit Link   : 1965   MRN: [id number]      Evaluation Date: 2023      Admitting Dx: Left arm numbness [R20.0]  Cerebrovascular accident (CVA), unspecified mechanism ([address] [I63.9]  Paresthesia [R20.2]  Paresthesias [R20.2]  A-fib ([address] [I48.91]    Pain: Did not state                                  CHART REVIEW:  2023 admitted with c/o LUE numbness  MD ADMISSION H&P HPI:  \"The patient is a 61 y.o. male who presents to [facility] with persistent lue numbness x 4 days. He denies neck pain, ue weakness, trauma, changes in speech. He also says he has felt his heart \"fluttering\" today. Has never had this problem before. \"    IMAGING:  CXR: 2023  Impression   No significant findings in the chest.     CT CHEST:  CT HEAD: 2023  Impression   No acute intracranial abnormality. Paranasal sinus disease     CTA HEAD/NECK: 2023  Impression   Unremarkable CTA of the head and neck. BRAIN MRI: ordered 2023    Current diet: Regular/Thin    History/Prior Level of Function:   Living Status: admitted from home; independent prior to admission; endorses mildly reduced recall at baseline; girlfriend assists with appointment management; active ; completed high school; unable to work since work injury  Prior Dysphagia History: unremarkable  Prior Speech History: no prior ST; does endorse mildly reduced recall at baseline      Reason for referral: SLP evaluation orders received due to CVA protocol . DYSPHAGIA BEDSIDE SWALLOW EVALUATION   Dysphagia Impressions/Dysphagia Diagnosis: Oropharyngeal Dysphagia     Oral and pharyngeal stages appear grossly WFL    Recommended Diet and Intervention:  Diet Solids Recommendation:  Regular texture diet  Liquid Consistency Recommendation:   Thin liquids  Recommended form of Meds: Meds whole with water Compensatory Swallowing Strategies:  Upright as possible with all PO intake     TEST DATA:   Patient Positioning: Upright in chair    Consistencies presented: thin liquids; regular    Oral Mechanism Exam:  [x]WFL []Mild   [] Moderate  []Severe  []To be assessed    Oral Phase: [x]WFL []Mild   [] Moderate  []Severe  []To be assessed   []Impaired/Prolonged Mastication:   []Spillage Left:   []Spillage Right:  []Pocketing Left:   []Pocketing Right:   []Decreased Anterior to Posterior Transit:   []Suspected Premature Bolus Loss:   []Lingual/Palatal Residue:   []Other:     Pharyngeal Phase: [x]WFL []Mild   [] Moderate  []Severe  []To be assessed   []Delayed Swallow:   []Suspected Pharyngeal Pooling:   []Decreased Laryngeal Elevation:   []Absent Swallow:  []Wet Vocal Quality:   []Throat Clearing-Immediate:   []Throat Clearing-Delayed:   []Cough-Immediate:   []Cough-Delayed:  []Change in Vital Signs:  []Suspected Delayed Pharyngeal Clearing:  []Other:     Dysphagia Prognosis: Good      SPEECH LANGUAGE COGNITIVE ASSESSMENT:     Speech Diagnosis:   Cognitive-Linguistic Deficits     Impressions: Motor speech and receptive/expressive language appear grossly WFL. Mildly reduced recall for novel information characterized by need for min cues for recall of 4 novel units with graded delay in duration for recall; attention, problem solving, and reasoning appear WFL.  Reduced recall is reported as baseline per patient; skilled ST offered, but patient requests to defer skilled ST at this time and is aware of ability to pursue ST in the future with request for referral from MD.    Test Data:   Vision: [facility]  Hearing: [facility]    COMPREHENSION  Auditory Comprehension: Within functional limits   Visual Language Processing: Within functional limits    EXPRESSION  Verbal Expressive Language: Within functional limits   Written Expressive Language:   Within functional limits  Pragmatics/Social Functioning: Within functional limits       MOTOR SPEECH  Motor Speech: Within functional limits     VOICE  Voice: Within functional limits     COGNITION    Overall Orientation : Within functional limits    Attention: Within functional limits    Memory: Mild    Impaired Short-term Memory  Impaired Recall of New Learning   Problem Solving: Within functional limits    Safety/Judgement: Within functional limits      SLP Prognosis: Good    Discharge Recommendations:  No further follow-up appears indicated at this time. EDUCATION:   Provided education regarding role of SLP, results of assessment, recommendations and general speech pathology plan of care. [x] Pt verbalized understanding and agreement   [] Pt requires ongoing learning   [] No evidence of comprehension     If patient discharges prior to next visit, this note will serve as discharge. Time code minutes:  0  Total Time:  30    Electronically signed by:   Nancy Barajas.  Earl Concepcion, [address] [place] [address], [id number]  Speech-Language Pathologist  Portable phone: [phone]  02/19/23 12:24 PM

## 2023-02-19 NOTE — PROGRESS NOTES
Occupational Therapy  Facility/Department: [address] PROGRESSIVE CARE  Occupational Therapy Initial Assessment    Name: Nicki Rodriguez  : 1965  MRN: [id number]  Date of Service: 2023    Discharge Recommendations:  Home independently  OT Equipment Recommendations  Equipment Needed: No     Nicki Rodriguez scored a 24/24 on the AM-PAC ADL Inpatient form. At this time, no further OT is recommended upon discharge. Recommend patient returns to prior setting with prior services. Patient Diagnosis(es): The primary encounter diagnosis was Cerebrovascular accident (CVA), unspecified mechanism ([facility] [address] A diagnosis of Left arm numbness was also pertinent to this visit. Past Medical History:  has a past medical history of COVID-19, Hyperlipidemia, Hypertension, Medial meniscus tear, and Pneumonia. Past Surgical History:  has a past surgical history that includes Appendectomy; Mandible surgery; Colonoscopy; and Knee arthroscopy (Right, 2022). Assessment   Assessment: 60 y/o male admitted 2023 with L UE numbness. PTA pt lives at home with significant other and was independent with ADLs and functional mobility. Today, pt reports L UE numbness is still present, however does not interfere with function. Pt completed ADL transfers and functional mobility around room/bathroom and in sage independently. pt with slight limp from knee scope in 2022. Pt ind with ADLs. Pt is safe to return home and no further OT is warranted. Prognosis: Good  Decision Making: Low Complexity  No Skilled OT: Safe to return home; No OT goals identified  REQUIRES OT FOLLOW-UP: No  Activity Tolerance  Activity Tolerance: Patient Tolerated treatment well        Plan   Occupational Therapy Plan  Times Per Week: DC acute OT     Restrictions  Restrictions/Precautions  Restrictions/Precautions: Up as Tolerated    Subjective   General  Chart Reviewed: Yes  Patient assessed for rehabilitation services?: Yes  Additional Pertinent Hx: 60 y/o male admitted 2/18/2023 with L UE numbness. CT head negative. MRI pending. Family / Caregiver Present: No  Referring Practitioner: Dr. Yamileth Smith: Pt seen bedside and agreeable to therapy. General Comment  Comments: Per RN [id number] Nilam Metcalf for therapy     Social/Functional History  Social/Functional History  Lives With: Significant other  Type of Home: House  Home Layout: Multi-level, Bed/Bath upstairs, 1/2 bath on main level, Laundry in basement (2 story + basement; half bath currently getting remodeled)  Home Access: Stairs to enter with rails  Entrance Stairs - Number of Steps: 3 FRANCOISE  Bathroom Shower/Tub: Tub/Shower unit  Bathroom Toilet: Standard  Bathroom Accessibility: Accessible  Home Equipment: Cane  Has the patient had two or more falls in the past year or any fall with injury in the past year?: No  ADL Assistance: Independent  Homemaking Assistance: Independent  Ambulation Assistance: Independent (With [employer]  Transfer Assistance: Independent  Active : Yes  Occupation: Retired  Additional Comments: Pt reports did not pursue Out-Pt PT following recent Scope. Objective   Safety Devices  Type of Devices: Gait belt (Pt left in bathroom cont self care; nursing aware/agree.)        AROM: Within functional limits  Strength: Within functional limits  Coordination: Within functional limits  Tone: Normal  Sensation:  (entire L UE numb but able to detect light touches)    ADL  Grooming: Independent (pt in bathroom brushing teeth at end of session)  LE Dressing: Independent (pt able to muarisio socks sitting EOB in prep for transfer)  Additional Comments: Anticipate pt will be independent for all ADls based on balance and endurance observed     Activity Tolerance  Activity Tolerance: Patient tolerated treatment well  Activity Tolerance Comments: Pt aylin oob, amb functional distances independently. Recent R Knee Scope; residual Quad weakness.     Bed mobility  Supine to Sit: Independent  Sit to Supine:  (pt in bathroom at end of session)     Vision  Vision: Within Functional Limits  Hearing  Hearing: Within functional limits (Ringing in ear (new sx). )  Cognition  Overall Cognitive Status: WFL  Orientation  Overall Orientation Status: Within Functional Limits                  Education Given To: Patient  Education Provided: Role of Therapy;Plan of Care;Transfer Training  Education Method: Demonstration;Verbal  Barriers to Learning: None  Education Outcome: Verbalized understanding;Demonstrated understanding    LUE AROM (degrees)  LUE AROM : WFL  Left Hand AROM (degrees)  Left Hand AROM: WFL  RUE AROM (degrees)  RUE AROM : WFL  Right Hand AROM (degrees)  Right Hand AROM: [facility]       AM-PAC Score  AM-PAC Inpatient Daily Activity Raw Score: 24 (02/19/23 0901)  AM-PAC Inpatient ADL T-Scale Score : 57.54 (02/19/23 0901)  ADL Inpatient CMS 0-100% Score: 0 (02/19/23 0901)  ADL Inpatient CMS G-Code Modifier : [address] (02/19/23 0901)      Goals  Short Term Goals  Time Frame for Short Term Goals: No acute OT goals identified  Patient Goals   Patient goals : to return home       Therapy Time   Individual Concurrent Group Co-treatment   Time In 0710         Time Out 0735         Minutes 25         Timed Code Treatment Minutes: 15 Minutes     This note to serve as OT d/c summary if pt is d/c-ed prior to next therapy session.     Rufina Dean, OTR/L

## 2023-02-19 NOTE — PLAN OF CARE
Problem: Discharge Planning  Goal: Discharge to home or other facility with appropriate resources  Outcome: Progressing  Flowsheets (Taken 2/19/2023 1113)  Discharge to home or other facility with appropriate resources: Identify barriers to discharge with patient and caregiver     Problem: Pain  Goal: Verbalizes/displays adequate comfort level or baseline comfort level  Outcome: Progressing     Problem: Safety - Adult  Goal: Free from fall injury  Outcome: Progressing     Problem: Neurosensory - Adult  Goal: Achieves stable or improved neurological status  Outcome: Progressing  Flowsheets (Taken 2/19/2023 1113)  Achieves stable or improved neurological status: Assess for and report changes in neurological status     Problem: Neurosensory - Adult  Goal: Achieves maximal functionality and self care  Outcome: Progressing  Flowsheets (Taken 2/19/2023 1113)  Achieves maximal functionality and self care: Monitor swallowing and airway patency with patient fatigue and changes in neurological status     Problem: Cardiovascular - Adult  Goal: Maintains optimal cardiac output and hemodynamic stability  Outcome: Progressing     Problem: Cardiovascular - Adult  Goal: Absence of cardiac dysrhythmias or at baseline  Outcome: Progressing     Problem: Musculoskeletal - Adult  Goal: Return mobility to safest level of function  Outcome: Progressing  Flowsheets (Taken 2/19/2023 1113)  Return Mobility to Safest Level of Function: Assess patient stability and activity tolerance for standing, transferring and ambulating with or without assistive devices     Problem: Musculoskeletal - Adult  Goal: Return ADL status to a safe level of function  Outcome: Progressing  Flowsheets (Taken 2/19/2023 1113)  Return ADL Status to a Safe Level of Function: Administer medication as ordered

## 2023-02-19 NOTE — PROGRESS NOTES
Hospitalist Progress Note    CC: Paresthesia      Admit date: 2/18/2023  Days in hospital:  1    Subjective/interval history: Pt S/E. No acute events. O2 status: room air    ROS:   Pertinent items are noted in HPI. Objective:    BP (!) 164/102   Pulse 64   Temp 98.6 °F (37 °C) (Oral)   Resp 18   Ht 6' (1.829 m)   Wt 252 lb 13.9 oz (114.7 kg)   SpO2 100%   BMI 34.29 kg/m²     Gen: alert, NAD  HEENT: NC/AT, moist mucous membranes, no oropharyngeal erythema or exudate  Neck: supple, trachea midline, no anterior cervical or SC LAD  Heart: Normal s1/s2, RRR, no murmurs, gallops, or rubs. Lungs: clear bilaterally, no wheezing, no rales, no rhonchi, no use of accessory muscles  Abd: bowel sounds present, soft, nontender, nondistended, no masses  Extrem: No clubbing, cyanosis, no edema  Skin: no rashes or lesions  Psych: A & O x3, affect appropriate  Neuro: grossly intact, moves all four extremities spontaneously. Cap refill: +2 sec    Medications:  Scheduled Meds:   busPIRone  5 mg Oral BID    aspirin  81 mg Oral Daily    Or    aspirin  300 mg Rectal Daily    atorvastatin  80 mg Oral Nightly    enoxaparin  30 mg SubCUTAneous BID       PRN Meds:  ondansetron **OR** ondansetron, polyethylene glycol, perflutren lipid microspheres    IV:   dilTIAZem Stopped (02/19/23 0053)         Intake/Output Summary (Last 24 hours) at 2/19/2023 1707  Last data filed at 2/19/2023 0908  Gross per 24 hour   Intake 720 ml   Output --   Net 720 ml       Results:  CBC:   Recent Labs     02/18/23  1017 02/19/23  0517   WBC 6.5 6.7   HGB 14.8 14.6   HCT 43.7 43.0   MCV 85.9 86.9    200     BMP:   Recent Labs     02/18/23  1017      K 4.6      CO2 24   BUN 12   CREATININE 1.0     Mag: No results for input(s): MAG in the last 72 hours.   Phos: No results found for: PHOS  No results found for: GLU    LIVER PROFILE:   Recent Labs     02/18/23  1017   AST 19   ALT 13   BILITOT 0.3   ALKPHOS 73 PT/INR:   Recent Labs     02/18/23  1017   PROTIME 12.3   INR 0.92     APTT: No results for input(s): APTT in the last 72 hours. UA:No results for input(s): NITRITE, COLORU, PHUR, LABCAST, WBCUA, RBCUA, MUCUS, TRICHOMONAS, YEAST, BACTERIA, CLARITYU, SPECGRAV, LEUKOCYTESUR, UROBILINOGEN, BILIRUBINUR, BLOODU, GLUCOSEU, AMORPHOUS in the last 72 hours. Invalid input(s): Adriane Slider input(s): ABG  Lab Results   Component Value Date    CALCIUM 9.1 02/18/2023       Assessment:    Principal Problem:    Paresthesia  Active Problems:    Paresthesias    A-fib (HCC)  Resolved Problems:    * No resolved hospital problems. [facility] course: a 61 y.o. male who presents to [facility] with persistent lue numbness x 4 days. He denies neck pain, ue weakness, trauma, changes in speech. He also says he has felt his heart \"fluttering\" today. Has never had this problem before.      ED workup  Vitals: bp mildly elevated  Pertinent labs: unremarkable  Imaging: cxr, ct head, cta head/neck are all unremarkable    Plan:  Possible CVA  - with symptoms: lue paresthesias  - head CT neg for acute pathology  - CTA head/neck  - MRI, ECHO  - ASA, statin  - check lipids, HBA1c  - start to normalize bp  - consult neurology      New diagnosis of paf  - rate is variable  - start cardizem bolus+ gtt -> change to po cardizem today  - no AC yet, until mri resulted  - consult cardiology     Chronic conditions - continue home meds unless otherwise stated  Htn - restart lisinopril and toprol tomorrow    Code status:  full  DVT prophylaxis: [] Lovenox  [] SQ Heparin  [] SCDs  [] warfarin/oral direct thrombin inhibitor [] Encourage ambulation    Disposition:  [] Home [] Rehab [] Psych [] SNF  [] LTAC  [] Transfer to ICU  [] Transfer to PCU [] Other: in pt    Electronically signed by Ashlee Reyes DO on 2/19/2023 at 5:07 PM

## 2023-02-19 NOTE — PROGRESS NOTES
Physical Therapy  Facility/Department: [facility] PROGRESSIVE CARE  Physical Therapy Initial Assessment/Discharge Summary    Name: Marlen Ortiz  : 1965  MRN: [id number]  Date of Service: 2023    Discharge Recommendations:  Home with assist PRN   PT Equipment Recommendations  Equipment Needed: No  Marlen Ortiz scored a 24/24 on the AM-PAC short mobility form. At this time, no further PT is recommended upon discharge. Assessment   Assessment: 62 y/o male admit 2023 with L UE Numbness. CT/CTA : negative. MRI : pending. PMH as noted including PAF, OA R Knee, S/P Scope R Knee 2022. PTA pt living with sign other in multi level home; independent daily care and functional mobility (occass use of cane). Currently,  Pt aylin oob, amb functional distances independently. Recent R Knee Scope; residual Quad weakness. Pt reports adequate assist/support for d/c home. No further PT indicated at this time. Therapy Prognosis: Good  Decision Making: Low Complexity  History: 62 y/o male admit 2023 with L UE Numbness. CT/CTA : negative. MRI : pending. PMH as noted including PAF, OA R Knee, S/P Scope R Knee 2022. Exam: See above. Clinical Presentation: See above. Barriers to Learning: None. Requires PT Follow-Up: No  Activity Tolerance  Activity Tolerance: Patient tolerated treatment well  Activity Tolerance Comments: Pt aylin oob, amb functional distances independently. Recent R Knee Scope; residual Quad weakness. Plan   Physcial Therapy Plan  General Plan: Discharge with evaluation only  Safety Devices  Type of Devices:  (Pt left in bathroom cont self care; nursing aware/agree.)     Restrictions  Restrictions/Precautions  Restrictions/Precautions: Up as Tolerated     Subjective   General  Chart Reviewed: Yes  Patient assessed for rehabilitation services?: Yes  Additional Pertinent Hx: 62 y/o male admit 2023 with L UE Numbness. CT/CTA : negative. MRI : pending.   PMH as noted including PAF, OA R Knee, S/P Scope R Knee 11/2022. Family / Caregiver Present: No  Referring Practitioner: Brandy Ferraro D.O. Follows Commands: Within Functional Limits  Subjective  Subjective: Pt agreeable to PT Eval/Rx. Reports cont numb L UE. Social/Functional History  Social/Functional History  Lives With: Significant other  Type of Home: House  Home Layout: Multi-level, Bed/Bath upstairs, 1/2 bath on main level, Laundry in basement (2 story + basement; half bath currently getting remodeled)  Home Access: Stairs to enter with rails  Entrance Stairs - Number of Steps: 3 FRANCOISE  Bathroom Shower/Tub: Tub/Shower unit  Bathroom Toilet: Standard  Bathroom Accessibility: Accessible  Home Equipment: Cane  Has the patient had two or more falls in the past year or any fall with injury in the past year?: No  ADL Assistance: Independent  Homemaking Assistance: Independent  Ambulation Assistance: Independent (With [employer]  Transfer Assistance: Independent  Active : Yes  Occupation: Retired  Additional Comments: Pt reports did not pursue Out-Pt PT following recent Scope. Vision/Hearing  Vision  Vision: Within Functional Limits  Hearing  Hearing: Within functional limits (Ringing in ear (new sx). )    Cognition   Orientation  Overall Orientation Status: Within Functional Limits  Cognition  Overall Cognitive Status: WFL     Objective                 AROM RLE (degrees)  RLE AROM: WFL  AROM LLE (degrees)  LLE AROM : WFL  AROM RUE (degrees)  RUE AROM : WFL  AROM LUE (degrees)  LUE AROM : WFL  Strength RLE  Comment: Weak Quad; Ext Lag with Knee Ext. Strength LLE  Strength LLE: WFL  Strength RUE  Strength RUE: WFL  Strength LUE  Strength LUE: WFL           Bed mobility  Supine to Sit: Independent  Transfers  Sit to Stand: Independent  Stand to Sit: Independent  Ambulation  Surface: Level tile  Device: No Device  Distance: Pt amb to/from bathroom, additional 125' without assist device Independently.   Diminished R Knee Ext during stance although no LE buckling/giving way. Gait steady. AM-PAC Score  AM-PAC Inpatient Mobility Raw Score : 24 (02/19/23 0828)  AM-PAC Inpatient T-Scale Score : 61.14 (02/19/23 0828)  Mobility Inpatient CMS 0-100% Score: 0 (02/19/23 0828)  Mobility Inpatient CMS G-Code Modifier : [facility] (02/19/23 3907)            Goals  Short Term Goals  Time Frame for Short Term Goals: No Acute Care PT Goals Identified. Patient Goals   Patient Goals : Return home.        Education  Patient Education  Education Given To: Patient  Education Provided Comments: Role of PT, POC, Need to call for assist.  Education Method: Verbal  Barriers to Learning: None  Education Outcome: Verbalized understanding      Therapy Time   Individual Concurrent Group Co-treatment   Time In 0700         Time Out 0730         Minutes [id number], PT

## 2023-02-19 NOTE — CONSULTS
[facility].        Chief Complaint   Patient presents with    Numbness     Pt complains of numbness and weakness in left arm and both feet x4days and ringing in both ears with intermittent numbness in both feet. . Pt states hx of borderline diabetes BS 94 on arrival. Pt states /100 at home. Hx of HTN. History of Present Illness:  April Hernandez is a 61 y.o. patient who presented to the hospital with complaints of L upper and lower extremity tingling. I have been asked to provide consultation regarding further management and testing. Patient who presented with unilateral tingling, incidentally found to have atrial fibrillation which by the time I have seen him has spontaneously converted to NSR. Denies any previous history of AF    Past Medical History:   has a past medical history of COVID-19, Hyperlipidemia, Hypertension, Medial meniscus tear, and Pneumonia. Surgical History:   has a past surgical history that includes Appendectomy; Mandible surgery; Colonoscopy; and Knee arthroscopy (Right, 11/2/2022). Social History:   reports that he quit smoking about 29 years ago. His smoking use included cigarettes. He has a 10.00 pack-year smoking history. He has never used smokeless tobacco. He reports current alcohol use of about 6.0 standard drinks per week. He reports that he does not use drugs. Family History:  No family history of premature coronary artery disease, aortic disease, or valve disease. Home Medications:  Were reviewed and are listed in nursing record. and/or listed below  Prior to Admission medications    Medication Sig Start Date End Date Taking?  Authorizing Provider   meloxicam (MOBIC) 7.5 MG tablet Take 7.5 mg by mouth as needed for Pain   Yes Historical Provider, MD   metoprolol succinate (TOPROL XL) 25 MG extended release tablet Take 25 mg by mouth daily   Yes Historical Provider, MD   lisinopril (PRINIVIL;ZESTRIL) 20 MG tablet Take 1 tablet by mouth daily 12/16/22   SHERI Porter CNP   atorvastatin (LIPITOR) 40 MG tablet Take 1 tablet by mouth daily 12/16/22   SHERI Miller CNP   busPIRone (BUSPAR) 5 MG tablet Take 1 tablet by mouth 2 times daily  Patient taking differently: Take 5 mg by mouth 2 times daily as needed 11/17/22   SHERI Porter CNP   aspirin EC 81 MG EC tablet Take 1 tablet by mouth 2 times daily 11/2/22   Zuleika Hollingsworth MD        Current Medications:  Current Facility-Administered Medications   Medication Dose Route Frequency Provider Last Rate Last Admin    busPIRone (BUSPAR) tablet 5 mg  5 mg Oral BID Octavia R Hurst, DO   5 mg at 02/19/23 0919    ondansetron (ZOFRAN-ODT) disintegrating tablet 4 mg  4 mg Oral Q8H PRN Octavia R Hurst, DO        Or    ondansetron (ZOFRAN) injection 4 mg  4 mg IntraVENous Q6H PRN Octavia R Hurst, DO        polyethylene glycol (GLYCOLAX) packet 17 g  17 g Oral Daily PRN Octavia R Hurst, DO        aspirin EC tablet 81 mg  81 mg Oral Daily Octavia R Hurst, DO   81 mg at 02/19/23 8656    Or    aspirin suppository 300 mg  300 mg Rectal Daily Octavia R Hurst, DO        perflutren lipid microspheres (DEFINITY) injection 1.5 mL  1.5 mL IntraVENous ONCE PRN Octavia R Hurst, DO        atorvastatin (LIPITOR) tablet 80 mg  80 mg Oral Nightly Octavia R Hurst, DO   80 mg at 02/18/23 2043    dilTIAZem 125 mg in sodium chloride 0.9 % 125 mL infusion  5 mg/hr IntraVENous Continuous Nadira Nassar MD   Stopped at 02/19/23 0053    enoxaparin Sodium (LOVENOX) injection 30 mg  30 mg SubCUTAneous BID Octavia R Hurst, DO   30 mg at 02/19/23 9901        Allergies:  Patient has no known allergies. Review of Systems:   + parasthesia upper and lower extremity    Physical Examination:    Vitals:    02/19/23 1100   BP: (!) 152/100   Pulse: 66   Resp: 18   Temp: 98.1 °F (36.7 °C)   SpO2: 97%      Weight: 252 lb 13.9 oz (114.7 kg)       Physical Exam  Vitals and nursing note reviewed.    Constitutional: Appearance: Normal appearance. He is not ill-appearing or diaphoretic. HENT:      Head: Normocephalic and atraumatic. Mouth/Throat:      Mouth: Mucous membranes are moist.   Eyes:      Pupils: Pupils are equal, round, and reactive to light. Cardiovascular:      Rate and Rhythm: Normal rate and regular rhythm. Heart sounds: No murmur heard. Pulmonary:      Effort: Pulmonary effort is normal. No respiratory distress. Abdominal:      General: Abdomen is flat. There is no distension. Musculoskeletal:      Right lower leg: No edema. Left lower leg: No edema. Skin:     General: Skin is warm and dry. Neurological:      General: No focal deficit present. Mental Status: He is alert and oriented to person, place, and time.    Psychiatric:         Mood and Affect: Mood normal.         Behavior: Behavior normal.        Labs  CBC:   Lab Results   Component Value Date/Time    WBC 6.7 02/19/2023 05:17 AM    RBC 4.95 02/19/2023 05:17 AM    HGB 14.6 02/19/2023 05:17 AM    HCT 43.0 02/19/2023 05:17 AM    MCV 86.9 02/19/2023 05:17 AM    RDW 14.6 02/19/2023 05:17 AM     02/19/2023 05:17 AM     CMP:    Lab Results   Component Value Date/Time     02/18/2023 10:17 AM    K 4.6 02/18/2023 10:17 AM     02/18/2023 10:17 AM    CO2 24 02/18/2023 10:17 AM    BUN 12 02/18/2023 10:17 AM    CREATININE 1.0 02/18/2023 10:17 AM    GFRAA >60 09/07/2022 01:48 PM    AGRATIO 1.4 02/18/2023 10:17 AM    LABGLOM >60 02/18/2023 10:17 AM    GLUCOSE 98 02/18/2023 10:17 AM    PROT 7.0 02/18/2023 10:17 AM    CALCIUM 9.1 02/18/2023 10:17 AM    BILITOT 0.3 02/18/2023 10:17 AM    ALKPHOS 73 02/18/2023 10:17 AM    AST 19 02/18/2023 10:17 AM    ALT 13 02/18/2023 10:17 AM     PT/INR:  No results found for: PTINR  Lab Results   Component Value Date    TROPONINI <0.01 02/18/2023       EKG:  I have reviewed EKG with the following interpretation:  Impression:  Atrial fibrillation    Echo: 10/2022   Normal LV size and wall motion. EF is    60%. Normal diastolic function. The left atrium is normal in size. The right ventricle is normal in size and function. T   Mild mitral & trivial tricuspid regurgitation. Stress: 9/2022   There is normal isotope uptake at stress and rest. There is no evidence of    myocardial ischemia or scar. The LVEF is normal and the LV wall motion is normal.        This is a low risk cardiac scan. Old notes reviewed  Telemetry reviewed  Ekg personally reviewed  Chest xray personally reviewed  Echo, cath, and   Medications and labs reviewed  high complexity/medical decision making due to extensive data review, extensive history review, independent review of data  high risk due to acute illness, evaluation of drug-drug interactions, medication management and diagnostic interventions    Atrial Fibrillation CHADSVASC2 Score Stroke Risk:   61 y.o. <65 - 0    male Male - 0   CHF HX: No - 0   HTN HX: Yes - 1   Stroke/TIA/Thromboembolism No - 0   Vascular Disease HX: No - 0   Diabetes Mellitus No - 0   CHADSVASC 2 Score 1      Annual Stroke Risk 0.6% - low-moderate risk          Assessment/Plan:  Paroxysmal atrial fibrillation: This is a new diagnosis, patient I think is asymptomatic from his AF standpoint - I dont think the parasthesias are related  - TTE ordered which I agree with, recent ECHO was unremarkable  - Negative stress test 9/2022  - history of hypertension, if there is no new systolic dysfunction then he actually does not merit AC by CHADSVASC  - Recommend EP follow up as an outpatient      ---    I will address the patient's cardiac risk factors and adjusted pharmacologic treatment as needed. In addition, I have reinforced the need for patient directed risk factor modification. Tobacco use was discussed with the patient and educated on the negative effects. I have asked the patient to not utilize these agents.     Thank you for allowing to us to participate in the care or Edilma Burk Simba. Further evaluation will be based upon the patient's clinical course and testing results. All questions and concerns were addressed to the patient/family. Alternatives to my treatment were discussed. The note was completed using EMR. Every effort was made to ensure accuracy; however, inadvertent computerized transcription errors may be present. Jerman Zuniga MD  Interventional Cardiology  2/19/2023  12:06 PM    [facility], [address] [place], [place] [address]Bhavesh Cabrera [facility]  Ph: [phone]  Fax: [phone]    NOTE: This report was transcribed using voice recognition software. Every effort was made to ensure accuracy, however, inadvertent computerized transcription errors may be present.

## 2023-02-19 NOTE — H&P
Hospital Medicine History & Physical      PCP: SHERI Smith CNP    Date of Admission: 2/18/2023    Date of Service: Pt seen/examined, with 1st encounter, on 2/18/2023 and Admitted to Inpatient     Chief Complaint:  lue numbness      History Of Present Illness: The patient is a 61 y.o. male who presents to [facility] with persistent lue numbness x 4 days. He denies neck pain, ue weakness, trauma, changes in speech. He also says he has felt his heart \"fluttering\" today. Has never had this problem before. ED workup  Vitals: bp mildly elevated  Pertinent labs: unremarkable  Imaging: cxr, ct head, cta head/neck are all unremarkable      Past Medical History:        Diagnosis Date    COVID-19 March 2022, residual cough    Hyperlipidemia     Hypertension     Medial meniscus tear     Pneumonia        Past Surgical History:        Procedure Laterality Date    APPENDECTOMY      COLONOSCOPY      KNEE ARTHROSCOPY Right 11/2/2022    RIGHT KNEE ARTHROSCOPY WITH MEDIAL MENISCUS REPAIR performed by Yoel Hull MD at . [facility]         Medications Prior to Admission:    Prior to Admission medications    Medication Sig Start Date End Date Taking?  Authorizing Provider   guaiFENesin (MUCINEX) 600 MG extended release tablet Take 2 tablets by mouth 2 times daily as needed for Congestion  Patient not taking: Reported on 2/18/2023 12/28/22   Scarlett Ferrell MD   lisinopril (PRINIVIL;ZESTRIL) 20 MG tablet Take 1 tablet by mouth daily 12/16/22   SHERI Smith CNP   atorvastatin (LIPITOR) 40 MG tablet Take 1 tablet by mouth daily 12/16/22   SHERI Miller CNP   busPIRone (BUSPAR) 5 MG tablet Take 1 tablet by mouth 2 times daily 11/17/22   SHERI Miller CNP   aspirin EC 81 MG EC tablet Take 1 tablet by mouth 2 times daily 11/2/22   María Rico MD       Allergies:  Patient has no known allergies. Social History:      TOBACCO:   reports that he quit smoking about 29 years ago. His smoking use included cigarettes. He has a 10.00 pack-year smoking history. He has never used smokeless tobacco.  ETOH:   reports current alcohol use of about 6.0 standard drinks per week. Family History:          Problem Relation Age of Onset    Kidney Disease Mother     Heart Failure Mother     Heart Attack Father 52    Heart Disease Father     Kidney Disease Brother        REVIEW OF SYSTEMS:   Positive for lue numbness and as noted in the HPI. All other systems reviewed and negative. PHYSICAL EXAM:    BP (!) 156/102   Pulse 62   Temp 98 °F (36.7 °C) (Oral)   Resp 13   Ht 6' (1.829 m)   Wt 250 lb 7.1 oz (113.6 kg)   SpO2 98%   BMI 33.97 kg/m²     General appearance: No apparent distress, cooperative. HEENT Normal cephalic, atraumatic without obvious deformity. PERRL. EOM. Conjunctivae/corneas clear. Neck: Supple, No jugular venous distention/bruits. Trachea midline   Lungs: Clear to auscultation, bilaterally without Rales/Wheezes/Rhonchi without accessory muscle use. Heart: Regular rate and rhythm with Normal S1/S2 without murmurs, rubs or gallops  Abdomen: Soft, non-tender or non-distended without rigidity or guarding and positive bowel sounds all four quadrants. Extremities: No clubbing, cyanosis, or edema bilaterally. Skin: Skin color, texture, turgor normal.  No rashes or lesions. Neurologic: Alert and oriented X 3, neurovascularly intact with sensory/motor intact upper extremities/lower extremities, bilaterally. Strength +5/5 throughout. Grossly non-focal.   Mental status: Alert, oriented, thought content appropriate.   Peripheral Pulses: +3 Easily felt, not easily obliterated with pressure  Cap refill  +2 sec    CBC   Recent Labs     02/18/23  1017   WBC 6.5   HGB 14.8   HCT 43.7    RENAL  Recent Labs     02/18/23  1017      K 4.6      CO2 24   BUN 12   CREATININE 1.0     LFT'S  Recent Labs     02/18/23  1017   AST 19   ALT 13   BILITOT 0.3   ALKPHOS 73     COAG  Recent Labs     02/18/23  1017   INR 0.92     CARDIAC ENZYMES  Recent Labs     02/18/23  1017   TROPONINI <0.01       U/A:  No results found for: NITRITE, COLORU, WBCUA, RBCUA, MUCUS, BACTERIA, CLARITYU, SPECGRAV, LEUKOCYTESUR, BLOODU, GLUCOSEU, AMORPHOUS    ABG  No results found for: YUO4PWI, BEART, V7ANHYGT, PHART, THGBART, JAG6MBB, PO2ART, TZD3SKB        Active Hospital Problems    Diagnosis Date Noted    Paresthesia [R20.2] 02/18/2023     Priority: Medium    Paresthesias [R20.2] 02/18/2023     Priority: Medium         PHYSICIANS CERTIFICATION:    I certify that Brigido Lynne is expected to be hospitalized for more than 2 midnights based on the following assessment and plan:      ASSESSMENT/PLAN:    Possible CVA  - with symptoms: lue paresthesias  - head CT neg for acute pathology  - CTA head/neck  - MRI, ECHO  - ASA, statin  - check lipids, HBA1c  - allow permissive HTN, SBP < 220, DBP < 110  - consult neurology     New diagnosis of paf  - rate is variable  - start cardizem bolus+ gtt  - no AC yet, until mri resulted  - consult cardiology    Chronic conditions - continue home meds unless otherwise stated  Htn    DVT Prophylaxis: lovenox  Code Status: Full Code    Dispo - in ot       [address] [facility], DO    Thank you SHERI Gomez - BREE for the opportunity to be involved in this patient's care. If you have any questions or concerns please feel free to contact me at [phone].

## 2023-02-19 NOTE — CONSULTS
NEUROLOGY CONSULT  NOTE :    Reason for Consult: Left upper extremity numbness    History of Present Illness:  Zeina Benson is a 61 y.o. male who is being seen in consultation at the request of Dr Jessica Burkett DO for evaluation of left upper extremity numbness. History was obtained from the patient and according to him, he developed symptoms 4 days ago with left upper extremity numbness but denies any neck pain issues or radiation of pain from the neck to the upper extremities and denies any headaches, blurred vision, double vision, weakness, tingling and numbness on the right side and left lower extremity and denies any dizziness or balance issues on walking except for tingling in the ears.     Medical History:  Past Medical History:   Diagnosis Date    COVID-19 March 2022, residual cough    Hyperlipidemia     Hypertension     Medial meniscus tear     Pneumonia      Past Surgical History:   Procedure Laterality Date    APPENDECTOMY      COLONOSCOPY      KNEE ARTHROSCOPY Right 11/2/2022    RIGHT KNEE ARTHROSCOPY WITH MEDIAL MENISCUS REPAIR performed by Karl Buenrostro MD at . [facility]       Medications Prior to Admission: meloxicam (MOBIC) 7.5 MG tablet, Take 7.5 mg by mouth as needed for Pain  metoprolol succinate (TOPROL XL) 25 MG extended release tablet, Take 25 mg by mouth daily  [DISCONTINUED] guaiFENesin (MUCINEX) 600 MG extended release tablet, Take 2 tablets by mouth 2 times daily as needed for Congestion (Patient not taking: Reported on 2/18/2023)  lisinopril (PRINIVIL;ZESTRIL) 20 MG tablet, Take 1 tablet by mouth daily  atorvastatin (LIPITOR) 40 MG tablet, Take 1 tablet by mouth daily  busPIRone (BUSPAR) 5 MG tablet, Take 1 tablet by mouth 2 times daily (Patient taking differently: Take 5 mg by mouth 2 times daily as needed)  aspirin EC 81 MG EC tablet, Take 1 tablet by mouth 2 times daily  No Known Allergies  Family History   Problem Relation Age of Onset    Kidney Disease Mother     Heart Failure Mother     Heart Attack Father 52    Heart Disease Father     Kidney Disease Brother      Social History     Tobacco Use   Smoking Status Former    Packs/day: 0.50    Years: 20.00    Pack years: 10.00    Types: Cigarettes    Quit date: 12    Years since quittin.1   Smokeless Tobacco Never     Social History     Substance and Sexual Activity   Drug Use No     Social History     Substance and Sexual Activity   Alcohol Use Yes    Alcohol/week: 6.0 standard drinks    Types: 6 Cans of beer per week    Comment: 1 beer daily          Current Facility-Administered Medications:     busPIRone (BUSPAR) tablet 5 mg, 5 mg, Oral, BID, Octavia JANELL Peralta, DO, 5 mg at 23    ondansetron (ZOFRAN-ODT) disintegrating tablet 4 mg, 4 mg, Oral, Q8H PRN **OR** ondansetron (ZOFRAN) injection 4 mg, 4 mg, IntraVENous, Q6H PRN, Octavia Lovellst, DO    polyethylene glycol (GLYCOLAX) packet 17 g, 17 g, Oral, Daily PRN, Octavia Lovellst, DO    aspirin EC tablet 81 mg, 81 mg, Oral, Daily, 81 mg at 23 **OR** aspirin suppository 300 mg, 300 mg, Rectal, Daily, Octavia Lovellst, DO    perflutren lipid microspheres (DEFINITY) injection 1.5 mL, 1.5 mL, IntraVENous, ONCE PRN, Octavia R Nasst, DO    atorvastatin (LIPITOR) tablet 80 mg, 80 mg, Oral, Nightly, Octavia R Nasst, DO, 80 mg at 23    [COMPLETED] dilTIAZem injection 5 mg, 5 mg, IntraVENous, Once, 5 mg at 23 **FOLLOWED BY** dilTIAZem 125 mg in sodium chloride 0.9 % 125 mL infusion, 5 mg/hr, IntraVENous, Continuous, Shonna Oviedo MD, Stopped at 23    enoxaparin Sodium (LOVENOX) injection 30 mg, 30 mg, SubCUTAneous, BID, Octavia R Nasst, DO, 30 mg at 23    ROS:  Constitutional- No weight loss or fevers  Eyes- No diplopia. No photophobia. Ears/nose/throat- No dysphagia. No Dysarthria  Cardiovascular- No palpitations. No chest pain  Respiratory- No dyspnea.  No Cough  Gastrointestinal- No Abdominal pain. No Vomiting. Genitourinary- No incontinence. No urinary retention  Musculoskeletal- No myalgia. No arthralgia  Skin- No rash. No easy bruising. Psychiatric- No depression. No anxiety  Endocrine- No diabetes. No thyroid issues. Hematologic- No bleeding difficulty. No fatigue  Neurologic- No weakness. No Headache. General Examination :  Constitutional    Vital signs: BP, HR, and RR reviewed  Blood pressure (!) 152/100, pulse 66, temperature 98.1 °F (36.7 °C), temperature source Oral, resp. rate 18, height 6' (1.829 m), weight 252 lb 13.9 oz (114.7 kg), SpO2 97 %. General appearance: Moderately built, moderately nourished  HEENT: Pupils equal, round, and reactive to light. Conjunctivae/corneas clear. Neck: Supple, with full range of motion. No jugular venous distention. Respiratory:  Breath sounds clear, no rhonchi. Cardiovascular: Regular rate and rhythm with normal S1/S2 without murmurs, rubs or gallops. Abdomen: Soft, non-tender, non-distended with normal bowel sounds. Musculoskeletal: No clubbing, cyanosis or edema bilaterally. Skin: Skin color, texture, turgor normal.  No rashes or lesions. Neurologic Examination :  Mental status: Orientation to person, place and time. Comprehension, naming and repetition intact. Attention intact and following verbal commands well. Cranial nerves:   CN2: Visual fields intact w/o extinction on confrontational testing, pupils equal round, reactive to light and accomodation. CN 3,4,6: Extraocular muscles intact,  CN5: Facial sensation intact,  CN7: Face symmetric without dysarthria,   CN8: Hearing grossly intact,  CN9: Palate movement symmetric  CN11: Full strength on shoulder shrug  CN12: Tongue midline with protrusion  Motor: Normal bulk/tone. No prontator drift. Good strength in all 4 extremities   Deep tendon reflexes: Normal reflexes in all 4 extremities  Sensory: Light touch intact in all 4 extremities.   Cerebellar/coordination: Finger to nose is intact without ataxia  Gait: Deferred due to safety issues. Labs  Recent Results (from the past 24 hour(s))   CBC    Collection Time: 02/19/23  5:17 AM   Result Value Ref Range    WBC 6.7 4.0 - 11.0 K/uL    RBC 4.95 4.20 - 5.90 M/uL    Hemoglobin 14.6 13.5 - 17.5 g/dL    Hematocrit 43.0 40.5 - 52.5 %    MCV 86.9 80.0 - 100.0 fL    MCH 29.5 26.0 - 34.0 pg    MCHC 33.9 31.0 - 36.0 g/dL    RDW 14.6 12.4 - 15.4 %    Platelets 672 358 - 607 K/uL    MPV 8.2 5.0 - 10.5 fL   Hemoglobin A1c    Collection Time: 02/19/23  5:17 AM   Result Value Ref Range    Hemoglobin A1C 5.2 See comment %    eAG 102.5 mg/dL   Lipid Panel    Collection Time: 02/19/23  5:17 AM   Result Value Ref Range    Cholesterol, Total 122 0 - 199 mg/dL    Triglycerides 228 (H) 0 - 150 mg/dL    HDL 29 (L) 40 - 60 mg/dL    LDL Calculated 47 <100 mg/dL    VLDL Cholesterol Calculated 46 Not Established mg/dL       Studies  CTA HEAD NECK W CONTRAST   Final Result   Unremarkable CTA of the head and neck. CT HEAD WO CONTRAST   Final Result   No acute intracranial abnormality. Paranasal sinus disease         XR CHEST 1 VIEW   Final Result   No significant findings in the chest.         MRI brain without contrast    (Results Pending)       Assessment:  Ritesh Batres is a 61 y.o. male history of hypertension and hyperlipidemia was admitted with left upper extremity numbness for 4 days. Plan : 1. Reviewed CT head and CTA Head and neck and agree with MRI Brain without contrast and recommend MRI of the cervical spine without contrast to evaluate for cervical issues. Consider EMG/NCV studies as an OP if the above tests are negative. 2.Continue with ASA 81 mg and atorvastatin 80 mg at bedtime. 3.PT/OT/Speech and swallow evaluation. 4.Will follow the patient during the hospitalization. Thank you for allowing me to participate in the care of the patient. Please feel free to call for any questions at your convenience.     Your's Sincerely,    Jeff Garcia M.D  Board Certified Neurologist  [address] [place] [address] [address] Neuroscience  [phone]

## 2023-02-19 NOTE — PROGRESS NOTES
Clinical Pharmacy Note  Subcutaneous Anticoagulant Adjustment     Enoxaparin has been adjusted to 30mg BID based on [address] policy. Recent Labs     02/18/23  1017   CREATININE 1.0     Recent Labs     02/18/23  1017   HGB 14.8   HCT 43.7      INR 0.92     Estimated Creatinine Clearance: 105 mL/min (based on SCr of 1 mg/dL). Pharmacist Review of Appropriate Use and Automatic Dose Adjustment of Subcutaneous Anticoagulants (Adult)    The guidance below is to provide initial recommendations for dosing. If recommended dose does not align well with patient's current clinical picture, communications with the care team will occur to determine most appropriate medication and dose. TABLE 1. ENOXAPARIN ROUTINE PROPHYLAXIS DOSING (Medically ill, routine surgery)   Patient Weight (kg)     50.9 and below 51 - 100.9 101 - 150.9 151 - 174.9 175 or greater         Estimated CrCl  (ml/min) 30 or greater   30 mg SUBQ daily   40 mg SUBQ daily 30 mg SUBQ BID  40 mg SUBQ BID 60mg SUBQ BID      15-29 UFH 5000 units SUBQ BID   30 mg SUBQ daily 30 mg SUBQ daily 40 mg SUBQ daily   60 mg SUBQ daily      Less than 15 or Dialysis UFH 5000 units SUBQ BID   UFH 5000 units SUBQ TID UFH 7500 units SUBQ TID       TABLE 2. ENOXAPARIN TREATMENT DOSING   (Based on 1mg/kg BID for DVT/PE/AFib)   Patient Weight (kg)     50.9 and below .9 151-189.9 190 or greater         Estimated CrCl  (ml/min) 30 or greater Recommend [address] standardized UFH infusion, apixaban or rivaroxaban 1mg/kg SUBQ BID 1mg/kg SUBQ BID if anti-Xa levels are feasible per institution. Alternatively,  recommend switch to [address] standardized UFH infusion     Recommend switch to [address] standardized UFH infusion. 15-29 Recommend [address] standardized UFH infusion or apixaban 1mg/kg SUBQ daily Recommend switch to [address] standardized UFH infusion     Less than 15 or Dialysis Recommend switch to [address] standardized UFH infusion.      [facility] 2/18/2023 8:04 PM

## 2023-02-20 ENCOUNTER — APPOINTMENT (OUTPATIENT)
Dept: MRI IMAGING | Age: 58
DRG: 058 | End: 2023-02-20
Payer: COMMERCIAL

## 2023-02-20 VITALS
RESPIRATION RATE: 16 BRPM | OXYGEN SATURATION: 99 % | HEART RATE: 63 BPM | SYSTOLIC BLOOD PRESSURE: 165 MMHG | TEMPERATURE: 98.4 F | HEIGHT: 72 IN | DIASTOLIC BLOOD PRESSURE: 95 MMHG | WEIGHT: 251.32 LBS | BODY MASS INDEX: 34.04 KG/M2

## 2023-02-20 LAB
LV EF: 58 %
LVEF MODALITY: NORMAL

## 2023-02-20 PROCEDURE — C8929 TTE W OR WO FOL WCON,DOPPLER: HCPCS

## 2023-02-20 PROCEDURE — 6370000000 HC RX 637 (ALT 250 FOR IP): Performed by: FAMILY MEDICINE

## 2023-02-20 PROCEDURE — 70551 MRI BRAIN STEM W/O DYE: CPT

## 2023-02-20 PROCEDURE — 94760 N-INVAS EAR/PLS OXIMETRY 1: CPT

## 2023-02-20 PROCEDURE — 72141 MRI NECK SPINE W/O DYE: CPT

## 2023-02-20 PROCEDURE — 6360000002 HC RX W HCPCS: Performed by: FAMILY MEDICINE

## 2023-02-20 RX ORDER — DILTIAZEM HYDROCHLORIDE 120 MG/1
120 CAPSULE, COATED, EXTENDED RELEASE ORAL DAILY
Qty: 30 CAPSULE | Refills: 3 | Status: SHIPPED | OUTPATIENT
Start: 2023-02-21

## 2023-02-20 RX ORDER — METHYLPREDNISOLONE 4 MG/1
TABLET ORAL
Qty: 1 KIT | Refills: 0 | Status: SHIPPED | OUTPATIENT
Start: 2023-02-20 | End: 2023-02-26

## 2023-02-20 RX ADMIN — LISINOPRIL 20 MG: 20 TABLET ORAL at 10:04

## 2023-02-20 RX ADMIN — PREDNISONE 30 MG: 20 TABLET ORAL at 15:10

## 2023-02-20 RX ADMIN — METOPROLOL SUCCINATE 25 MG: 25 TABLET, FILM COATED, EXTENDED RELEASE ORAL at 10:04

## 2023-02-20 RX ADMIN — DILTIAZEM HYDROCHLORIDE 120 MG: 120 CAPSULE, EXTENDED RELEASE ORAL at 10:04

## 2023-02-20 RX ADMIN — ENOXAPARIN SODIUM 30 MG: 100 INJECTION SUBCUTANEOUS at 10:04

## 2023-02-20 RX ADMIN — BUSPIRONE HYDROCHLORIDE 5 MG: 5 TABLET ORAL at 10:04

## 2023-02-20 RX ADMIN — ASPIRIN 81 MG: 81 TABLET, COATED ORAL at 10:04

## 2023-02-20 NOTE — PLAN OF CARE
Problem: Discharge Planning  Goal: Discharge to home or other facility with appropriate resources  2/20/2023 1109 by Cabrera Cabral RN  Outcome: Adequate for Discharge     Problem: Pain  Goal: Verbalizes/displays adequate comfort level or baseline comfort level  2/20/2023 1109 by Cabrera Cabral RN  Outcome: Adequate for Discharge     Problem: Safety - Adult  Goal: Free from fall injury  2/20/2023 1109 by Cabrera Cabral RN  Outcome: Adequate for Discharge     Problem: Neurosensory - Adult  Goal: Achieves stable or improved neurological status  2/20/2023 1109 by Cabrera Cabral RN  Outcome: Adequate for Discharge     Problem: Neurosensory - Adult  Goal: Achieves maximal functionality and self care  2/20/2023 1109 by Cabrera Cabral RN  Outcome: Adequate for Discharge     Problem: Cardiovascular - Adult  Goal: Maintains optimal cardiac output and hemodynamic stability  2/20/2023 1109 by Cabrera Cabral RN  Outcome: Adequate for Discharge     Problem: Cardiovascular - Adult  Goal: Absence of cardiac dysrhythmias or at baseline  2/20/2023 1109 by Cabrera Cabral RN  Outcome: Adequate for Discharge     Problem: Musculoskeletal - Adult  Goal: Return mobility to safest level of function  2/20/2023 1109 by Cabrera Cabral RN  Outcome: Adequate for Discharge     Problem: Musculoskeletal - Adult  Goal: Return ADL status to a safe level of function  2/20/2023 1109 by Cabrera Cabral RN  Outcome: Adequate for Discharge

## 2023-02-20 NOTE — PLAN OF CARE
Problem: Neurosensory - Adult  Goal: Achieves stable or improved neurological status  2/20/2023 0346 by Rosa Machuca RN  Outcome: Progressing  Flowsheets (Taken 2/20/2023 0345)  Achieves stable or improved neurological status: Assess for and report changes in neurological status  2/19/2023 1719 by Ariadne Childers  Outcome: Progressing  Flowsheets (Taken 2/19/2023 1113)  Achieves stable or improved neurological status: Assess for and report changes in neurological status     Problem: Neurosensory - Adult  Goal: Achieves maximal functionality and self care  2/20/2023 0346 by Rosa Machuca RN  Outcome: Progressing  Flowsheets (Taken 2/20/2023 0345)  Achieves maximal functionality and self care: Encourage and assist patient to increase activity and self care with guidance from physical therapy/occupational therapy  2/19/2023 1719 by Ariadne Childers  Outcome: Progressing  Flowsheets (Taken 2/19/2023 1113)  Achieves maximal functionality and self care: Monitor swallowing and airway patency with patient fatigue and changes in neurological status     Problem: Cardiovascular - Adult  Goal: Maintains optimal cardiac output and hemodynamic stability  2/20/2023 0346 by oRsa Machuca RN  Outcome: Progressing  Flowsheets (Taken 2/20/2023 0345)  Maintains optimal cardiac output and hemodynamic stability:   Monitor blood pressure and heart rate   Monitor urine output and notify Licensed Independent Practitioner for values outside of normal range   Assess for signs of decreased cardiac output  2/19/2023 1719 by Ariadne Childers  Outcome: Progressing     Problem: Musculoskeletal - Adult  Goal: Return ADL status to a safe level of function  2/20/2023 0346 by Rosa Machuca RN  Outcome: Progressing  Flowsheets (Taken 2/20/2023 0345)  Return ADL Status to a Safe Level of Function:   Administer medication as ordered   Assess activities of daily living deficits and provide assistive devices as needed  2/19/2023 1719 by Jenelle Tse Dari  Outcome: Progressing  Flowsheets (Taken 2/19/2023 1113)  Return ADL Status to a Safe Level of Function: Administer medication as ordered

## 2023-02-20 NOTE — PROGRESS NOTES
Progress Note    Updates  No acute events overnight.      Past Medical History:   Diagnosis Date    COVID-19     Hyperlipidemia     Hypertension     Medial meniscus tear     Pneumonia      Current Facility-Administered Medications:     dilTIAZem (CARDIZEM CD) extended release capsule 120 mg, 120 mg, Oral, Daily, Octavia JANELL Peralta, DO, 120 mg at 02/19/23 1724    lisinopril (PRINIVIL;ZESTRIL) tablet 20 mg, 20 mg, Oral, Daily, Octavia R Nasst, DO    metoprolol succinate (TOPROL XL) extended release tablet 25 mg, 25 mg, Oral, Daily, Octavia R Hurst, DO    acetaminophen (TYLENOL) tablet 650 mg, 650 mg, Oral, Q4H PRN, Anton Halter, APRN - CNP, 650 mg at 02/19/23 2045    busPIRone (BUSPAR) tablet 5 mg, 5 mg, Oral, BID, Octavia R Nasst, DO, 5 mg at 02/19/23 2045    ondansetron (ZOFRAN-ODT) disintegrating tablet 4 mg, 4 mg, Oral, Q8H PRN **OR** ondansetron (ZOFRAN) injection 4 mg, 4 mg, IntraVENous, Q6H PRN, Octavia R Nasst, DO    polyethylene glycol (GLYCOLAX) packet 17 g, 17 g, Oral, Daily PRN, Octavia R Nasst, DO    aspirin EC tablet 81 mg, 81 mg, Oral, Daily, 81 mg at 02/19/23 0919 **OR** aspirin suppository 300 mg, 300 mg, Rectal, Daily, Octavia R Hurst, DO    perflutren lipid microspheres (DEFINITY) injection 1.5 mL, 1.5 mL, IntraVENous, ONCE PRN, Octavia R Nasst, DO    atorvastatin (LIPITOR) tablet 80 mg, 80 mg, Oral, Nightly, Octavia R Nasst, DO, 80 mg at 02/19/23 2045    enoxaparin Sodium (LOVENOX) injection 30 mg, 30 mg, SubCUTAneous, BID, Octavia R Nasst, DO, 30 mg at 02/19/23 2045    Current Outpatient Medications on File Prior to Encounter   Medication Sig    meloxicam (MOBIC) 7.5 MG tablet Take 7.5 mg by mouth as needed for Pain    metoprolol succinate (TOPROL XL) 25 MG extended release tablet Take 25 mg by mouth daily    lisinopril (PRINIVIL;ZESTRIL) 20 MG tablet Take 1 tablet by mouth daily    atorvastatin (LIPITOR) 40 MG tablet Take 1 tablet by mouth daily    busPIRone (BUSPAR) 5 MG tablet Take 1 tablet by mouth 2 times daily (Patient taking differently: Take 5 mg by mouth 2 times daily as needed)    aspirin EC 81 MG EC tablet Take 1 tablet by mouth 2 times daily     Exam  Blood pressure (!) 157/104, pulse 72, temperature 97.7 °F (36.5 °C), temperature source Oral, resp. rate 16, height 6' (1.829 m), weight 251 lb 5.2 oz (114 kg), SpO2 96 %. Constitutional    Vital signs: BP, HR, and RR reviewed   General alert, no distress  Eyes: unable to visualize the fundi  Cardiovascular: no peripheral edema. Psychiatric: cooperative with examination, no psychotic behavior noted. Neurologic  Mental status:   orientation to person, place, time. Attention intact as able to attend well to the exam     Language fluent in conversation   Comprehension intact; follows simple commands  Cranial nerves:   CN2: visual fields full   CN 3,4,6: extraocular muscles intact. Pupils are equal, round, reactive bilaterally. CN5: facial sensation symmetric   CN7: face symmetric without dysarthria  CN8: hearing grossly intact  CN12: tongue midline with protrusion  Strength: good strength in all 4 extremities   Deep tendon reflexes: normal in all 4 extremities  Sensory: LUE deficit. Cerebellar/coordination: a bit more deliberate w/ FNF on the L though no gross ataxia. Tone: normal in all 4 extremities  Gait: normal gait    ROS  Constitutional- No weight loss or fevers  Eyes- No diplopia. No photophobia. Ears/nose/throat- No dysphagia. No Dysarthria  Cardiovascular- No palpitations. No chest pain  Respiratory- No dyspnea. No Cough  Gastrointestinal- No Abdominal pain. No Vomiting. Genitourinary- No incontinence. No urinary retention  Musculoskeletal- No myalgia. No arthralgia  Skin- No rash. No easy bruising. Psychiatric- No depression. No anxiety  Endocrine- No diabetes. No thyroid issues. Hematologic- No bleeding difficulty. No fatigue  Neurologic- No weakness. No Headache.     Labs  Glucose 98  Na 139  K 4.6  BUN 12  Cr 1.0    CHOLESTEROL, TOTAL 122   HDL Cholesterol 29 (L)   LDL Calculated 47   Triglycerides 228 (H)   VLDL Cholesterol Calculated 46     HgA1c 5.2    ALT 13  AST 19    WBC 6.7K  Hg 14.6  Platelets 993    Studies  MRI brain w/o 2/20/23, independently reviewed  Impression   1. No acute intracranial abnormality. No acute infarct. 2. Mild chronic microvascular ischemic changes. 3. Minimal aerated secretions within the left maxillary sinus. MRI cervical spine w/o 2/20/23, independently reviewed  Impression   1. Multilevel cervical spondylosis, most notable at C5-C6 (moderate/severe   left foraminal narrowing). No high-grade spinal canal stenosis. 2. Degenerative Modic type 1 changes present C5-C6. 3. No abnormal cervical spinal cord signal.     CTA head/neck 2/18/23, independently reviewed  Impression   Unremarkable CTA of the head and neck. Impression/Recommendations  Persistent LUE sensory deficit. DDD and spondylosis on cervical spine imaging was identified, most notable at C5-C6. May benefit form neurosurgery referral.  Could consider EMG/NCS outpatient. We will sign off. Please call back w/ any additional questions or concerns. Thank you.      Andrews Jose NP  [address] [place] [address] [address] Neurology    A copy of this note was provided for Dr Roger Jameson DO

## 2023-02-20 NOTE — PROGRESS NOTES
Patient remained in sinus rhythm through the night. Tylenol given once for toothache. Slept well. MRI checklist verified complete.

## 2023-02-28 NOTE — PROGRESS NOTES
Physician Progress Note      PATIENT:               Marielos Cid  [facility] #:                  [id number]  :                       1965  ADMIT DATE:       2023 9:43 AM  DISCH DATE:        2023 6:58 PM  RESPONDING  PROVIDER #:        Mary Anne Guaman DO          QUERY TEXT:    Dear Dr. Hernan Payan,  Pt admitted with L upper extremity paresthesia . Pt noted to have MRI brain   with no acute and MRI cervical spine with \"DDD and spondylosis\". If possible,   please document in progress notes and discharge summary the relationship, if   any, between L upper extremity paresthesia and DDD and spondylosis of cervical   spine. The medical record reflects the following:  Risk Factors: Hx HTN, DM, new A-fib  Clinical Indicators:  ED for  numbness and weakness in left arm and both   feet x4days and ringing in both ears with intermittent numbness in both feet. Admit for Possible CVA- with symptoms: lue paresthesias, CT and CTA-no acute,   Neuro notes LUE numbness x 4 days  Cardiology notes \"Paroxysmal atrial   fibrillation: This is a new diagnosis, patient I think is asymptomatic from   his AF standpoint - I dont think the parasthesias are related\",  MRI brain-no   acute  Neuro notes \"Persistent LUE sensory deficit. DDD and spondylosis on   cervical spine imaging was identified, m  Treatment: CT, Neuro consult, MRI brain, MRI cervical spine, consider EMG/NCS   outpatient and Neurosurgery referral  Thank you,  Jimmie Cary RN, [email or web address]  Options provided:  -- LUE paresthesia due to DDD and spondylosis of cervical spine  -- LUE paresthesia unrelated to DDD and spondylosis of cervical spine  -- Other - I will add my own diagnosis  -- Disagree - Not applicable / Not valid  -- Disagree - Clinically unable to determine / Unknown  -- Refer to Clinical Documentation Reviewer    PROVIDER RESPONSE TEXT:    This patient has LUE paresthesia due to DDD and spondylosis of cervical spine.     Query created by:  [facility] Mak Vasquez on 2/23/2023 8:48 AM      Electronically signed by:  Jairon Payne DO 2/28/2023 9:24 AM

## 2023-03-02 ENCOUNTER — TELEPHONE (OUTPATIENT)
Dept: CARDIOLOGY CLINIC | Age: 58
End: 2023-03-02

## 2023-03-02 ENCOUNTER — OFFICE VISIT (OUTPATIENT)
Dept: CARDIOLOGY CLINIC | Age: 58
End: 2023-03-02
Payer: COMMERCIAL

## 2023-03-02 VITALS
WEIGHT: 250 LBS | HEART RATE: 72 BPM | OXYGEN SATURATION: 95 % | DIASTOLIC BLOOD PRESSURE: 88 MMHG | HEIGHT: 73 IN | SYSTOLIC BLOOD PRESSURE: 130 MMHG | BODY MASS INDEX: 33.13 KG/M2

## 2023-03-02 DIAGNOSIS — R06.02 SOB (SHORTNESS OF BREATH): Primary | ICD-10-CM

## 2023-03-02 DIAGNOSIS — I48.0 PAF (PAROXYSMAL ATRIAL FIBRILLATION) (HCC): ICD-10-CM

## 2023-03-02 PROCEDURE — G8417 CALC BMI ABV UP PARAM F/U: HCPCS | Performed by: INTERNAL MEDICINE

## 2023-03-02 PROCEDURE — 99205 OFFICE O/P NEW HI 60 MIN: CPT | Performed by: INTERNAL MEDICINE

## 2023-03-02 PROCEDURE — G8427 DOCREV CUR MEDS BY ELIG CLIN: HCPCS | Performed by: INTERNAL MEDICINE

## 2023-03-02 PROCEDURE — 1111F DSCHRG MED/CURRENT MED MERGE: CPT | Performed by: INTERNAL MEDICINE

## 2023-03-02 PROCEDURE — 3017F COLORECTAL CA SCREEN DOC REV: CPT | Performed by: INTERNAL MEDICINE

## 2023-03-02 PROCEDURE — 93000 ELECTROCARDIOGRAM COMPLETE: CPT | Performed by: INTERNAL MEDICINE

## 2023-03-02 PROCEDURE — G8482 FLU IMMUNIZE ORDER/ADMIN: HCPCS | Performed by: INTERNAL MEDICINE

## 2023-03-02 PROCEDURE — 1036F TOBACCO NON-USER: CPT | Performed by: INTERNAL MEDICINE

## 2023-03-02 RX ORDER — FLECAINIDE ACETATE 50 MG/1
50 TABLET ORAL 2 TIMES DAILY
Qty: 60 TABLET | Refills: 3 | Status: SHIPPED | OUTPATIENT
Start: 2023-03-02

## 2023-03-02 NOTE — TELEPHONE ENCOUNTER
Spoke with the patient and got him scheduled for his CTA and ablation. We went over instructions below and he verbalized understanding. CTA Pre procedure instructions       As part of your pre procedure work up you will need to get a CT scan of your heart. This scan is completed in order to give Dr. Dandre White a map of the atrium (chamber of your heart) where he will be doing the ablation. Date: 3/16/2023     Time: 1:20 pm, arrive by 1:00 pm     -Arrive 20 minutes prior to scheduled testing time  -Nothing to eat or drink for at least 4 hours prior to test     Atrial Fibrillation Ablation Pre procedure Instructions     Date: 4/7/2023     Arrive at: 10:00 am   Procedure time: 11:30 am      The morning of your procedure you will park in the hospital parking lot and report directly to the cath lab to check in. At the information desk stay right and go all the way to the end of the sage, this will take you directly to your check in desk for the cath lab. Pre-Procedure Instructions   You will need to fast (nothing to eat or drink) after midnight the day of your procedure. Do NOT chew gum or eat mints the day of your procedure. You will need to hold your Aspirin for 7 days prior to the procedure. You will need to hold your Flecainide for 7 days prior to the procedure. Do not use any lotions, creams or perfume the morning of procedure. You may take your lisinopril and Cardizem the morning the procedure with sips of water. You will need to complete pre-procedure lab work 5-7 days prior to your procedure. Please have a responsible adult to drive you home after procedure, you should go home same day, but there is always a possibility of an overnight stay.   Cath lab will provide you with all post procedure instructions  A 3 month follow up will be scheduled with Dr. Edna Ely Nurse practitioner Tati Frias CNP post procedure                                          [facility]  [facility] [address] [address]  Phone: [phone]  The hours are Mon -Fri.   6:30 am - 4:00 pm   Saturday 8:00 am - noon  No appointment necessary         Qgenda updated  / emailed cath lab

## 2023-03-02 NOTE — DISCHARGE SUMMARY
Hospital Medicine Discharge Summary    Patient: Maureen Hernandez     Gender: male  : 1965   Age: 61 y.o. MRN: [id number]    Code Status: Prior full code    Primary Care Provider: SHERI Guzman CNP    Admit Date: 2023   Discharge Date: 2023      Admitting Physician: Dennis Montiel DO  Discharge Physician: Dennis Montiel DO     Discharge Diagnoses: Active Hospital Problems    Diagnosis Date Noted    Paresthesia [R20.2] 2023     Priority: Medium    Paresthesias [R20.2] 2023     Priority: Medium    A-fib [facility]) [I48.91] 2023     Priority: Medium     Echo   Summary   Normal left ventricle size, wall thickness, and systolic function with an   estimated ejection fraction of 55-60%. No regional wall motion abnormalities   are seen. The right ventricle is normal in size and function. Mild tricuspid regurgitation. Trivial mitral regurgitation. Estimated pulmonary artery systolic pressure is normal at 26 mmHg assuming a   right atrial pressure of 3 mmHg. A bubble study was performed and fails to show evidence of shunting. Hospital Course:    a 61 y.o. male who presented to [facility] with persistent lue numbness x 4 days. He denies neck pain, ue weakness, trauma, changes in speech. He also says he has felt his heart \"fluttering\" today. Has never had this problem before. ED workup  Vitals: bp mildly elevated  Pertinent labs: unremarkable  Imaging: cxr, ct head, cta head/neck are all unremarkable  Work up completed, and improved with treatment as below.  was discharged today in stable condition.     - CTA head/neck negative  - MRI nonacute  - consult neurology     New diagnosis of paf  - rate is variable  - po cardizem today  - no AC chads vasc is 1, no ac  - consulted cardiology, follow up with Dr Jonna Crum in the office     Chronic conditions - continue home meds unless otherwise stated  Htn - restart lisinopril and toprol     Disposition:  Home    Exam: BP (!) 165/95   Pulse 63   Temp 98.4 °F (36.9 °C) (Oral)   Resp 16   Ht 6' (1.829 m)   Wt 251 lb 5.2 oz (114 kg)   SpO2 99%   BMI 34.09 kg/m²     General appearance:  No apparent distress, appears stated age and cooperative. HEENT:  Normal cephalic, atraumatic without obvious deformity. Pupils equal, round, and reactive to light. Extra ocular muscles intact. Conjunctivae/corneas clear. Neck: Supple, with full range of motion. No jugular venous distention. Trachea midline. Respiratory:  Normal respiratory effort. Clear to auscultation, bilaterally without Rales/Wheezes/Rhonchi. Cardiovascular:  Regular rate and rhythm with normal S1/S2 without murmurs, rubs or gallops. Abdomen: Soft, non-tender, non-distended with normal bowel sounds. Musculoskeletal:  No clubbing, cyanosis or edema bilaterally. Full range of motion without deformity. Skin: Skin color, texture, turgor normal.  No rashes or lesions. Neurologic:  Neurovascularly intact without any focal sensory/motor deficits. Cranial nerves: II-XII intact, grossly non-focal.  Psychiatric:  Alert and oriented, thought content appropriate, normal insight    Consults:     IP CONSULT TO PHARMACY  PHARMACY TO CHANGE BASE FLUIDS  IP CONSULT TO NEUROLOGY  IP CONSULT TO SPIRITUAL SERVICES  IP CONSULT TO CARDIOLOGY    Labs:  For convenience and continuity at follow-up the following most recent labs are provided:    Lab Results   Component Value Date/Time    WBC 6.7 02/19/2023 05:17 AM    HGB 14.6 02/19/2023 05:17 AM    HCT 43.0 02/19/2023 05:17 AM    MCV 86.9 02/19/2023 05:17 AM     02/19/2023 05:17 AM     02/18/2023 10:17 AM    K 4.6 02/18/2023 10:17 AM     02/18/2023 10:17 AM    CO2 24 02/18/2023 10:17 AM    BUN 12 02/18/2023 10:17 AM    CREATININE 1.0 02/18/2023 10:17 AM    CALCIUM 9.1 02/18/2023 10:17 AM    ALKPHOS 73 02/18/2023 10:17 AM    ALT 13 02/18/2023 10:17 AM    AST 19 02/18/2023 10:17 AM    BILITOT 0.3 02/18/2023 10:17 AM LABALBU 4.1 02/18/2023 10:17 AM    LDLCALC 47 02/19/2023 05:17 AM    TRIG 228 02/19/2023 05:17 AM     Lab Results   Component Value Date    INR 0.92 02/18/2023       Radiology:  MRI CERVICAL SPINE WO CONTRAST   Final Result   1. Multilevel cervical spondylosis, most notable at C5-C6 (moderate/severe   left foraminal narrowing). No high-grade spinal canal stenosis. 2. Degenerative Modic type 1 changes present C5-C6. 3. No abnormal cervical spinal cord signal.         MRI brain without contrast   Final Result   1. No acute intracranial abnormality. No acute infarct. 2. Mild chronic microvascular ischemic changes. 3. Minimal aerated secretions within the left maxillary sinus. RECOMMENDATIONS:   Unavailable         CTA HEAD NECK W CONTRAST   Final Result   Unremarkable CTA of the head and neck. CT HEAD WO CONTRAST   Final Result   No acute intracranial abnormality.       Paranasal sinus disease         XR CHEST 1 VIEW   Final Result   No significant findings in the chest.             Discharge Medications:   Discharge Medication List as of 2/20/2023  5:39 PM        START taking these medications    Details   dilTIAZem (CARDIZEM CD) 120 MG extended release capsule Take 1 capsule by mouth daily, Disp-30 capsule, R-3Normal      methylPREDNISolone (MEDROL DOSEPACK) 4 MG tablet Take by mouth., Disp-1 kit, R-0Normal           Discharge Medication List as of 2/20/2023  5:39 PM        Discharge Medication List as of 2/20/2023  5:39 PM        CONTINUE these medications which have NOT CHANGED    Details   meloxicam (MOBIC) 7.5 MG tablet Take 7.5 mg by mouth as needed for PainHistorical Med      metoprolol succinate (TOPROL XL) 25 MG extended release tablet Take 25 mg by mouth dailyHistorical Med      atorvastatin (LIPITOR) 40 MG tablet Take 1 tablet by mouth daily, Disp-90 tablet, R-1Normal      lisinopril (PRINIVIL;ZESTRIL) 20 MG tablet Take 1 tablet by mouth daily, Disp-30 tablet, R-1Normal      busPIRone (BUSPAR) 5 MG tablet Take 1 tablet by mouth 2 times daily, Disp-60 tablet, R-2Normal      aspirin EC 81 MG EC tablet Take 1 tablet by mouth 2 times daily, Disp-60 tablet, R-0Print           Discharge Medication List as of 2/20/2023  5:39 PM        STOP taking these medications       guaiFENesin (MUCINEX) 600 MG extended release tablet Comments:   Reason for Stopping:               Follow-up appointments:  one week    Provider Follow-up:    pcp    Condition at Discharge:  Stable    The patient was seen and examined on day of discharge and this discharge summary is in conjunction with any daily progress note from day of discharge.Time Spent on discharge is 45 minutes  in the examination, evaluation, counseling and review of medications and discharge plan.       Signed:    Octavia Peralta DO   3/2/2023      Thank you SHERI Miller - BREE for the opportunity to be involved in this patient's care. If you have any questions or concerns please feel free to contact me at [phone].

## 2023-03-02 NOTE — TELEPHONE ENCOUNTER
Please reach out to patient and schedule LEVI / atrial fibrillation ablation and CTA with with Dr. Devi Ziegler. Anesthesia is needed   Echo tech is NOT needed for LEVI. Include Carto Rep in email [email or web address]      CTA Pre procedure instructions      As part of your pre procedure work up you will need to get a CT scan of your heart. This scan is completed in order to give Dr. Devi Ziegler a map of the atrium (chamber of your heart) where he will be doing the ablation. Date:_________________________________    Time:_________________________________    Dereje Core 20 minutes prior to scheduled testing time  -Nothing to eat or drink for at least 4 hours prior to test    Atrial Fibrillation Ablation Pre procedure Instructions    Date:______________________________    Arrive at:__________________________    Procedure time:____________________    The morning of your procedure you will park in the hospital parking lot and report directly to the cath lab to check in. At the information desk stay right and go all the way to the end of the sage, this will take you directly to your check in desk for the cath lab. Pre-Procedure Instructions   You will need to fast (nothing to eat or drink) after midnight the day of your procedure. Do NOT chew gum or eat mints the day of your procedure. You will need to hold your Aspirin for 7 days prior to the procedure. You will need to hold your Flecainide for 7 days prior to the procedure. Do not use any lotions, creams or perfume the morning of procedure. You may take your lisinopril and Cardizem the morning the procedure with sips of water. You will need to complete pre-procedure lab work 5-7 days prior to your procedure. Please have a responsible adult to drive you home after procedure, you should go home same day, but there is always a possibility of an overnight stay.   Cath lab will provide you with all post procedure instructions  A 3 month follow up will be scheduled with  Ayaz's Nurse practitioner Tati Frias CNP post procedure                                        [facility]  [facility]. [address] [place] [address]  Phone: [phone]  The hours are Mon -Fri.   6:30 am - 4:00 pm   Saturday 8:00 am - noon  No appointment necessary

## 2023-03-02 NOTE — PROGRESS NOTES
Cardiac Electrophysiology Consultation   Date: 3/2/2023  Reason for Consultation:  Atrial fibrillation  Consult Requesting Physician: SHERI Gomez CNP  Primary Care Physician: SHERI Gomez CNP    Chief Complaint:   Chief Complaint   Patient presents with    New Patient    Chest Pain    Shortness of Breath        HPI: Isela Chavez is a 61 y.o. patient with a history of hyperlipidemia, hypertension and new onset atrial fibrillation. He presented to the [address] [place] [address] Road on  02/18/2023 reporting symptoms of intermittent numbness and weakness in left arm and bilateral feet for the previous 4 days and ringing in bilateral ears. MRI of brain 02/18/2023 showed no acute intracranial abnormality, no acute infarct and mild chronic microvascular ischemic changes. He manifested new onset atrial fibrillation seen on telemetry on 2/18/2023 @ 18:27:47. Today, he presents to office for evaluation of atrial fibrillation. EKG today shows SR with PVC. Patient reports that two weeks ago he was having symptoms of heart racing and numbness of his left arm and it prompted him to seek treatment in the ED. He states that he has been having intermittent palpitations since discharged from the hospital and is attributing the symptoms to being in atrial fibrillation and states \"I think it is hurting my heart\". Patient does not endorse any identifiable exacerbating or alleviating factors for the atrial fibrillation. Reports compliance with medications and tolerating them well. Denies chest pain/pressure, tightness, edema, shortness of breath, heart racing, lightheadedness, dizziness, syncope, presyncope,  PND or orthopnea.      Past Medical History:   Diagnosis Date    COVID-19 March 2022, residual cough    Hyperlipidemia     Hypertension     Medial meniscus tear     Pneumonia         Past Surgical History:   Procedure Laterality Date    APPENDECTOMY      COLONOSCOPY      KNEE ARTHROSCOPY Right 11/2/2022 RIGHT KNEE ARTHROSCOPY WITH MEDIAL MENISCUS REPAIR performed by Salma Lambert MD at . [address]         Allergies:  No Known Allergies    Medication:   Prior to Admission medications    Medication Sig Start Date End Date Taking? Authorizing Provider   metoprolol succinate (TOPROL XL) 25 MG extended release tablet Take 1 tablet by mouth daily 2/22/23  Yes SHERI Miller CNP   lisinopril (PRINIVIL;ZESTRIL) 20 MG tablet Take 1 tablet by mouth daily 2/22/23  Yes SHERI Miller - CNP   busPIRone (BUSPAR) 5 MG tablet Take 1 tablet by mouth 2 times daily 2/22/23  Yes SHERI Miller - CNP   dilTIAZem (CARDIZEM CD) 120 MG extended release capsule Take 1 capsule by mouth daily 2/21/23  Yes Octavia Peralta,    meloxicam (MOBIC) 7.5 MG tablet Take 7.5 mg by mouth as needed for Pain   Yes Historical Provider, MD   atorvastatin (LIPITOR) 40 MG tablet Take 1 tablet by mouth daily 12/16/22  Yes SHERI Miller - CNP   aspirin EC 81 MG EC tablet Take 1 tablet by mouth 2 times daily 11/2/22  Yes Salma Lambert MD       Social History:   reports that he quit smoking about 29 years ago. His smoking use included cigarettes. He has a 10.00 pack-year smoking history. He has never used smokeless tobacco. He reports current alcohol use of about 6.0 standard drinks per week. He reports that he does not use drugs. Family History:  family history includes Heart Attack (age of onset: 52) in his father; Heart Disease in his father; Heart Failure in his mother; Kidney Disease in his brother and mother. Reviewed.  Denies family history of sudden cardiac death, arrhythmia, premature CAD    Review of System:    General ROS: negative for - chills, fever   Psychological ROS: negative for - anxiety or depression  Ophthalmic ROS: negative for - eye pain or loss of vision  ENT ROS: negative for - epistaxis, headaches, nasal discharge, sore throat   Allergy and Immunology ROS: negative for - hives, nasal congestion   Hematological and Lymphatic ROS: negative for - bleeding problems, blood clots, bruising or jaundice  Endocrine ROS: negative for - skin changes, temperature intolerance or unexpected weight changes  Respiratory ROS: negative for - cough, hemoptysis, pleuritic pain, SOB, sputum changes or wheezing  Cardiovascular ROS: Per HPI. Gastrointestinal ROS: negative for - abdominal pain, blood in stools, diarrhea, hematemesis, melena, nausea/vomiting or swallowing difficulty/pain  Genito-Urinary ROS: negative for - dysuria or incontinence  Musculoskeletal ROS: negative for - joint swelling or muscle pain  Neurological ROS: negative for - confusion, dizziness, gait disturbance, headaches, numbness/tingling, seizures, speech problems, tremors, visual changes or weakness  Dermatological ROS: negative for - rash    Physical Examination:  Vitals:    03/02/23 0942   BP: 130/88   Pulse: 72   SpO2: 95%       Constitutional: Oriented. No distress. Head: Normocephalic and atraumatic. Mouth/Throat: Oropharynx is clear and moist.   Eyes: Conjunctivae normal. EOM are normal.   Neck: Normal range of motion. Neck supple. No rigidity. No JVD present. Cardiovascular: Normal rate, regular rhythm, S1&S2 and intact distal pulses. Pulmonary/Chest: Bilateral respiratory sounds. No wheezes. No rhonchi. Abdominal: Soft. Bowel sounds present. No distension, No tenderness. Musculoskeletal: No tenderness. No edema    Lymphadenopathy: Has no cervical adenopathy. Neurological: Alert and oriented. Cranial nerve appears intact, No Gross deficit   Skin: Skin is warm and dry. No rash noted. Psychiatric: Has a normal mood, affect and behavior     Labs:  Reviewed. ECG: Sinus  Rhythm  - occasional ectopic ventricular beat  with v-rate of 79 bpm with QRS duration 96 ms. No pathologic Q waves, ventricular pre-excitation, or QT prolongation. Studies:   1.  Event monitor:       2. Echo: 02/20/2023   Summary   Normal left ventricle size, wall thickness, and systolic function with an   estimated ejection fraction of 55-60%. No regional wall motion abnormalities   are seen. The right ventricle is normal in size and function. Mild tricuspid regurgitation. Trivial mitral regurgitation. Estimated pulmonary artery systolic pressure is normal at 26 mmHg assuming a   right atrial pressure of 3 mmHg. A bubble study was performed and fails to show evidence of shunting   LA volume/Index: 60 ml /26 ml/m2    3. Stress Test:  09/21/2022  Summary    There is normal isotope uptake at stress and rest. There is no evidence of    myocardial ischemia or scar. The LVEF is normal and the LV wall motion is normal.    This is a low risk cardiac scan. Stress Protocols        Resting ECG    Normal sinus rhythm. Resting HR:63 bpm  Resting BP:185/103 mmHg       Stress Protocol:Pharmacologic - Lexiscan's        Peak HR:93 bpm                               HR/BP product:[id number]    Peak BP:170/92 mmHg    Predicted HR: 162 bpm    % of predicted HR: 57    Test duration: 1 min    Reason for termination:Completed     4. Cath: n/a    I independently reviewed and interpreted the ECG, MCOT, echocardiogram, stress test, and coronary angiography/PCI results and used them for my plan of care. Procedures:  1. No previous EP procedures. Assessment/Plan:     Paroxysmal atrial fibrillation  - Symptomatic with palpitations. - First noted on telemetry 2/18/2023 @ 18:27:47  - Patient has a [id number] Score 1 (HTN)  - According to the AHA/ACC/HRS guidelines, with a [id number] score of 1,  the patient is not indicated for [address] at this time. However, if the patient wishes to pursue ablation, [address] will need to be prescribed for the next 3 months following the ablation.    - Continue Cardizem 120 mg daily for rate control of atrial fibrillation and blood pressure control.    Start Flecainide 50 mg BID for rhythm control. We educated the patient that atrial fibrillation is a worsening and progressive disease, with more frequent episodes that will ensue. Subsequent episodes usually become more sustained to the extent that many individuals would then develop persistent atrial fibrillation. Once persistence is reached, permanent atrial fibrillation is inevitable. We also discussed the fact that atrial fibrillation is associated with stroke, including life-threatening stroke, and therefore oral anticoagulation is warranted depending on the patient's [id number] score. We discussed different management options for atrial fibrillation including their risks and benefits. These options include use of cardioversion (mainly for persisting atrial fibrillation or atrial flutter) which provides an effective immediate therapy with success rates of 75% or higher, but it provides no short nor long term efficacy. Anti-arrhythmic medications provide a very effective short term therapy, but even with our most potent anti-arrhythmic medication there is limited long term efficacy (clinical studies have shown that 40% of patients remain atrial fibrillation-free after 4 years of follow-up after starting one of the more powerful anti-arrhythmic medication (amiodarone), and, if extrapolated, may have further diminishing success as time goes on). Atrial fibrillation ablation is a potentially curative therapy with very reasonable success rate after a first time procedure and with improving success rates with subsequent procedures. The risks, benefits and alternatives of the atrial fibrillation ablation procedure were discussed with the patient.  The risks including, but not limited to, bleeding, infection, radiation exposure, injury to vascular, cardiac and surrounding structures (including pneumothorax), stroke, cardiac perforation, tamponade, need for emergent open heart surgery, need for pacemaker implantation, injury to the phrenic nerve, injury to the esophagus, myocardial infarction and death were discussed in detail. The patient was also counseled at length about the risks of micaela Covid-19 in the blasie-operative and post-operative states including the recovery window of their procedure. The patient was made aware that micaela Covid-19 after a surgical procedure may worsen their prognosis for recovering from the virus and lend to a higher morbidity and or mortality risk. The patient was given the option of postponing their procedure. The patient was also presented reasonable alternatives to the proposed care, treatment, and services. The discussion I have had with the patient encompassed risks, benefits, and side effects related to the alternatives and the risks related to not receiving the proposed care, treatment and services. The patient opted to proceed with the atrial fibrillation ablation. We will schedule for a radiofrequency ablation with [employer] Navigation system with a LEVI procedure immediately prior to this ablation. A cardiac CTA will be ordered for pulmonary vein mapping prior to this procedure. We will order BMP, CBC and Type & Screen prior to the procedure. -  Much time was spent counseling the patient on healthier lifestyle, following a low sodium diet <2,400 mg  of sodium a day and dramatically decreasing the intake of processed sugar < 36 grams male. - Patient educated to eat a diet which includes organic fruits, vegetables and meats.    - Encouraged to watch \"That Sugar Film\" which can be found on [employer] and other streaming services, which discusses the negative impact of processed sugar has on the body. Essential hypertension  - Stable. - Continue current medical management. Follow ups: Follow up three months after procedure with Karan Thomas CNP. Thank you for allowing me to participate in the care of Jamil Arriola.  All questions and concerns were addressed to the patient/family. Alternatives to my treatment were discussed. This note was scribed in the presence of Dr. Caro Victoria MD by Katerina Ellis, RN. The scribe's documentation has been prepared under my direction and personally reviewed by me in its entirety. I confirm that the note above accurately reflects all work, physical examination, the discussion of treatments and procedures, and medical decision making performed by me.     Caro Victoria MD, MS, [facility]  Cardiac Electrophysiology  [address]  [address] [facility], [address] [facility]  Bhavesh Mayers [facility] [address]  [phone]

## 2023-03-02 NOTE — PATIENT INSTRUCTIONS
If you have any question regarding your ablation or would like to proceed with scheduling please contact Dr. Carlton Phan Nurse Trina Smith at [phone]. CTA Pre procedure instructions      As part of your pre procedure work up you will need to get a CT scan of your heart. This scan is completed in order to give Dr. Xander Ferreira a map of the atrium (chamber of your heart) where he will be doing the ablation. Date:_________________________________    Time:_________________________________    Brenda Bullock 20 minutes prior to scheduled testing time  -Nothing to eat or drink for at least 4 hours prior to test    Atrial Fibrillation Ablation Pre procedure Instructions    Date:______________________________    Arrive at:__________________________    Procedure time:____________________    The morning of your procedure you will park in the hospital parking lot and report directly to the cath lab to check in. At the information desk stay right and go all the way to the end of the sage, this will take you directly to your check in desk for the cath lab. Pre-Procedure Instructions   You will need to fast (nothing to eat or drink) after midnight the day of your procedure. Do NOT chew gum or eat mints the day of your procedure. You will need to hold your Aspirin for 7 days prior to the procedure. You will need to hold your Flecainide for 7 days prior to the procedure. Do not use any lotions, creams or perfume the morning of procedure. You may take your lisinopril and Cardizem the morning the procedure with sips of water. You will need to complete pre-procedure lab work 5-7 days prior to your procedure. Please have a responsible adult to drive you home after procedure, you should go home same day, but there is always a possibility of an overnight stay.   Cath lab will provide you with all post procedure instructions  A 3 month follow up will be scheduled with Dr. Carlton Phan Nurse practitioner Beata Virk CNP post procedure [facility]  [address] [place] [address]. [address] [place] [address]  Phone: [phone]  The hours are Mon -Fri.   6:30 am - 4:00 pm   Saturday 8:00 am - noon  No appointment necessary

## 2023-03-08 ENCOUNTER — TELEPHONE (OUTPATIENT)
Dept: CARDIOLOGY CLINIC | Age: 58
End: 2023-03-08

## 2023-03-08 NOTE — TELEPHONE ENCOUNTER
LVM for patient to return call to reschedule his procedure due to no Anesthesia being available on Good Friday at [facility].

## 2023-03-08 NOTE — TELEPHONE ENCOUNTER
Spoke with the patient and he is going to check with [facility] to see if he can do his procedure on 4/1223. He is currently scheduled on Good Friday and we don't had anesthesia available for procedure.

## 2023-03-16 ENCOUNTER — HOSPITAL ENCOUNTER (OUTPATIENT)
Dept: CT IMAGING | Age: 58
Discharge: HOME | End: 2023-03-16
Payer: COMMERCIAL

## 2023-03-16 DIAGNOSIS — I48.0 PAF (PAROXYSMAL ATRIAL FIBRILLATION) (HCC): ICD-10-CM

## 2023-03-16 PROCEDURE — 6360000004 HC RX CONTRAST MEDICATION: Performed by: INTERNAL MEDICINE

## 2023-03-16 PROCEDURE — 75574 CT ANGIO HRT W/3D IMAGE: CPT

## 2023-03-16 RX ADMIN — IOPAMIDOL 75 ML: 755 INJECTION, SOLUTION INTRAVENOUS at 13:20

## 2023-03-20 ENCOUNTER — TELEPHONE (OUTPATIENT)
Dept: CARDIOLOGY CLINIC | Age: 58
End: 2023-03-20

## 2023-03-20 NOTE — TELEPHONE ENCOUNTER
Called and spoke with patient advised of below. I'm calling to inform you that a state ruling effective April 1st will prohibit afib procedure from being done at [facility].  The ruling is due to the cath lab becoming a LEVEL 2 site. We can offer you the procedure at [facility]. located at [address] [place] with Dr Naa Tobin remaining your MD.        He is agreeable to go to [facility]. for his procedure.

## 2023-03-21 NOTE — TELEPHONE ENCOUNTER
LVM for the patient to return call to get him rescheduled for procedure over at [facility]      Atrial Fibrillation Ablation Pre procedure Instructions     Date:      Arrive at:  am   Procedure time:  am      The morning of your procedure you will park in the hospital parking lot and report directly to the cath lab to check in. At the information desk stay right and go all the way to the end of the sgae, this will take you directly to your check in desk for the cath lab. Pre-Procedure Instructions   You will need to fast (nothing to eat or drink) after midnight the day of your procedure. Do NOT chew gum or eat mints the day of your procedure. You will need to hold your Aspirin for 7 days prior to the procedure. You will need to hold your Flecainide for 7 days prior to the procedure. Do not use any lotions, creams or perfume the morning of procedure. You may take your lisinopril and Cardizem the morning the procedure with sips of water. You will need to complete pre-procedure lab work 5-7 days prior to your procedure. Please have a responsible adult to drive you home after procedure, you should go home same day, but there is always a possibility of an overnight stay.   Cath lab will provide you with all post procedure instructions  A 3 month follow up will be scheduled with Dr. Uche Payne Nurse practitioner Karel Mosquera CNP post procedure

## 2023-03-22 NOTE — TELEPHONE ENCOUNTER
Spoke with the patient and got him rescheduled at [facility] for his procedure. We went over instructions below and he verbalized understanding. Atrial Fibrillation Ablation Pre procedure Instructions     Date: 4/4/2023     Arrive at:  7:00 am   Procedure time: 9:00 am      The morning of your procedure you will park in the hospital parking lot and report directly to the cath lab to check in. At the information desk stay right and go all the way to the end of the sage, this will take you directly to your check in desk for the cath lab. Pre-Procedure Instructions   You will need to fast (nothing to eat or drink) after midnight the day of your procedure. Do NOT chew gum or eat mints the day of your procedure. You will need to hold your Aspirin for 7 days prior to the procedure. You will need to hold your Flecainide for 7 days prior to the procedure. Do not use any lotions, creams or perfume the morning of procedure. You may take your lisinopril and Cardizem the morning the procedure with sips of water. You will need to complete pre-procedure lab work 5-7 days prior to your procedure. Please have a responsible adult to drive you home after procedure, you should go home same day, but there is always a possibility of an overnight stay.   Cath lab will provide you with all post procedure instructions  A 3 month follow up will be scheduled with Dr. Gerhardt Mania Nurse practitioner [facility] post procedure    Levada Sheets updated / alerted cath lab (email)

## 2023-03-25 ENCOUNTER — HOSPITAL ENCOUNTER (EMERGENCY)
Age: 58
Discharge: HOME | End: 2023-03-25
Payer: COMMERCIAL

## 2023-03-25 VITALS
BODY MASS INDEX: 34.28 KG/M2 | HEIGHT: 72 IN | SYSTOLIC BLOOD PRESSURE: 139 MMHG | RESPIRATION RATE: 16 BRPM | DIASTOLIC BLOOD PRESSURE: 89 MMHG | OXYGEN SATURATION: 98 % | HEART RATE: 84 BPM | WEIGHT: 253.09 LBS | TEMPERATURE: 98 F

## 2023-03-25 DIAGNOSIS — S81.812A LACERATION OF LEFT LOWER LEG, INITIAL ENCOUNTER: Primary | ICD-10-CM

## 2023-03-25 PROCEDURE — 99283 EMERGENCY DEPT VISIT LOW MDM: CPT

## 2023-03-25 PROCEDURE — 6370000000 HC RX 637 (ALT 250 FOR IP): Performed by: PHYSICIAN ASSISTANT

## 2023-03-25 PROCEDURE — 12002 RPR S/N/AX/GEN/TRNK2.6-7.5CM: CPT

## 2023-03-25 RX ORDER — BACITRACIN ZINC AND POLYMYXIN B SULFATE 500; 1000 [USP'U]/G; [USP'U]/G
OINTMENT TOPICAL ONCE
Status: COMPLETED | OUTPATIENT
Start: 2023-03-25 | End: 2023-03-25

## 2023-03-25 RX ADMIN — BACITRACIN ZINC AND POLYMYXIN B SULFATE: 500; 10000 OINTMENT TOPICAL at 19:44

## 2023-03-25 ASSESSMENT — PAIN SCALES - GENERAL
PAINLEVEL_OUTOF10: 10
PAINLEVEL_OUTOF10: 10

## 2023-03-25 ASSESSMENT — PAIN - FUNCTIONAL ASSESSMENT
PAIN_FUNCTIONAL_ASSESSMENT: 0-10
PAIN_FUNCTIONAL_ASSESSMENT: ACTIVITIES ARE NOT PREVENTED
PAIN_FUNCTIONAL_ASSESSMENT: ACTIVITIES ARE NOT PREVENTED
PAIN_FUNCTIONAL_ASSESSMENT: 0-10

## 2023-03-25 ASSESSMENT — PAIN DESCRIPTION - ORIENTATION
ORIENTATION: LEFT
ORIENTATION: LEFT

## 2023-03-25 ASSESSMENT — PAIN DESCRIPTION - DESCRIPTORS
DESCRIPTORS: DISCOMFORT
DESCRIPTORS: DISCOMFORT

## 2023-03-25 ASSESSMENT — PAIN DESCRIPTION - LOCATION
LOCATION: LEG
LOCATION: LEG

## 2023-03-25 ASSESSMENT — PAIN DESCRIPTION - PAIN TYPE
TYPE: ACUTE PAIN
TYPE: ACUTE PAIN

## 2023-03-25 ASSESSMENT — PAIN DESCRIPTION - FREQUENCY: FREQUENCY: CONTINUOUS

## 2023-03-25 NOTE — ED NOTES
Discharge and education instructions reviewed. Patient verbalized understanding, teach-back successful. Patient denied questions at this time. No acute distress noted. Patient instructed to follow-up as noted - return to emergency department if symptoms worsen. Patient verbalized understanding. Discharged per EDMD with discharged instructions.        Prosper Myers RN  03/25/23 1950

## 2023-03-25 NOTE — ED PROVIDER NOTES
Right Ear: External ear normal.      Left Ear: External ear normal.      Nose: Nose normal.   Eyes:      General:         Right eye: No discharge. Left eye: No discharge. Pulmonary:      Effort: Pulmonary effort is normal. No respiratory distress. Musculoskeletal:         General: Normal range of motion. Cervical back: Normal range of motion. Comments: FAROM and MARILU in patient's left knee, left ankle  Normal sensation distally below the laceration   Skin:     General: Skin is warm and dry. Comments: 4-1/2 cm laceration that is about 0.5 cm in depth  Very minimal bleeding from the site   Neurological:      General: No focal deficit present. Mental Status: He is alert and oriented to person, place, and time. Psychiatric:         Mood and Affect: Mood normal.         Behavior: Behavior normal.           DIAGNOSTIC RESULTS   LABS:    Labs Reviewed - No data to display    When ordered only abnormal lab results are displayed. All other labs were within normal range or not returned as of this dictation. EKG: When ordered, EKG's are interpreted by the Emergency Department Physician in the absence of a cardiologist.  Please see their note for interpretation of EKG. RADIOLOGY:   Non-plain film images such as CT, Ultrasound and MRI are read by the radiologist. Plain radiographic images are visualized and preliminarily interpreted by the ED Provider with the below findings:      Interpretation per the Radiologist below, if available at the time of this note:    No orders to display     No results found. No results found.     PROCEDURES   Unless otherwise noted below, none     Lac Repair    Date/Time: 3/25/2023 7:53 PM  Performed by: Morena Perry PA-C  Authorized by: Morena Perry PA-C     Consent:     Consent obtained:  Verbal    Consent given by:  Patient    Risks, benefits, and alternatives were discussed: yes      Risks discussed:  Pain, poor cosmetic result, poor wound SEP-1 Core Measure due to severe sepsis or septic shock? No   Exclusion criteria - the patient is NOT to be included for SEP-1 Core Measure due to: Infection is not suspected    CONSULTS: (Who and What was discussed)  None              CC/HPI Summary, DDx, ED Course, and Reassessment: This is a 61y.o. year old, well-appearing male with  has a past medical history of COVID-19, Hyperlipidemia, Hypertension, Medial meniscus tear, and Pneumonia. who presents to the ED with complaint of laceration that began PTA. Vitals upon arrival show wnl. Physical Exam shows as above. Consults as above. Ddx includes: Laceration, other    Management Given:  -Lidocaine, Polysporin, symptoms improved    Disposition: Discharge  Patient was informed to return to the Emergency Department if any new worsening or more concerning symptoms occur, in agreement with plan. Shared decision making was practiced, and patient discharged in stable condition. Informed to follow up with PCP  for further evaluation. Medications were prescribed as below. All questions were answered. Disposition Considerations (include 1 Tests not done, Shared Decision Making, Pt Expectation of Test or Tx.): Consider doing imaging, however I was able to fully explore the wound without difficulty. He did not really have any pain with palpation to the area as well. I have low suspicion for foreign body. The wound was also very superficial.        I am the Primary Clinician of Record. FINAL IMPRESSION      1.  Laceration of left lower leg, initial encounter          DISPOSITION/PLAN     DISPOSITION Decision To Discharge 03/25/2023 07:30:11 PM      PATIENT REFERRED TO:  SHERI Jama - CNP  [facility] [address] [facility]  [phone]    Schedule an appointment as soon as possible for a visit in 1 week  for reevaluation    [facility] [address]  [phone]  Go to

## 2023-03-25 NOTE — DISCHARGE INSTRUCTIONS
Remove dressing tomorrow. Sutures need to come out in the next 7 to 10 days. You can see your primary care physician for this or go to an urgent care or return to [place] [address] to have this performed. Clean the area with soap and water only. Do not submerge underwater. Return if you start noticing any abnormal drainage from the area.

## 2023-03-28 ENCOUNTER — HOSPITAL ENCOUNTER (OUTPATIENT)
Dept: GENERAL RADIOLOGY | Age: 58
Discharge: HOME | End: 2023-03-28
Payer: COMMERCIAL

## 2023-03-28 ENCOUNTER — HOSPITAL ENCOUNTER (OUTPATIENT)
Age: 58
Discharge: HOME | End: 2023-03-28
Payer: COMMERCIAL

## 2023-03-28 DIAGNOSIS — G89.29 CHRONIC LOW BACK PAIN, UNSPECIFIED BACK PAIN LATERALITY, UNSPECIFIED WHETHER SCIATICA PRESENT: ICD-10-CM

## 2023-03-28 DIAGNOSIS — M54.50 CHRONIC LOW BACK PAIN, UNSPECIFIED BACK PAIN LATERALITY, UNSPECIFIED WHETHER SCIATICA PRESENT: ICD-10-CM

## 2023-03-28 DIAGNOSIS — R52 PAIN: ICD-10-CM

## 2023-03-28 PROCEDURE — 72100 X-RAY EXAM L-S SPINE 2/3 VWS: CPT

## 2023-03-28 PROCEDURE — 72040 X-RAY EXAM NECK SPINE 2-3 VW: CPT

## 2023-04-04 ENCOUNTER — ANESTHESIA (OUTPATIENT)
Dept: NON INVASIVE DIAGNOSTICS | Age: 58
End: 2023-04-04

## 2023-04-04 ENCOUNTER — ANESTHESIA EVENT (OUTPATIENT)
Dept: NON INVASIVE DIAGNOSTICS | Age: 58
End: 2023-04-04

## 2023-04-04 ENCOUNTER — HOSPITAL ENCOUNTER (OUTPATIENT)
Dept: NON INVASIVE DIAGNOSTICS | Age: 58
Discharge: HOME | End: 2023-04-04
Payer: COMMERCIAL

## 2023-04-04 VITALS
RESPIRATION RATE: 19 BRPM | OXYGEN SATURATION: 94 % | BODY MASS INDEX: 33.86 KG/M2 | DIASTOLIC BLOOD PRESSURE: 85 MMHG | WEIGHT: 250 LBS | SYSTOLIC BLOOD PRESSURE: 127 MMHG | HEART RATE: 81 BPM | HEIGHT: 72 IN | TEMPERATURE: 97.1 F

## 2023-04-04 LAB
EKG ATRIAL RATE: 79 BPM
EKG ATRIAL RATE: 81 BPM
EKG DIAGNOSIS: NORMAL
EKG DIAGNOSIS: NORMAL
EKG P AXIS: 53 DEGREES
EKG P AXIS: 56 DEGREES
EKG P-R INTERVAL: 150 MS
EKG P-R INTERVAL: 158 MS
EKG Q-T INTERVAL: 404 MS
EKG Q-T INTERVAL: 422 MS
EKG QRS DURATION: 98 MS
EKG QRS DURATION: 98 MS
EKG QTC CALCULATION (BAZETT): 469 MS
EKG QTC CALCULATION (BAZETT): 483 MS
EKG R AXIS: 14 DEGREES
EKG R AXIS: 7 DEGREES
EKG T AXIS: 24 DEGREES
EKG T AXIS: 28 DEGREES
EKG VENTRICULAR RATE: 79 BPM
EKG VENTRICULAR RATE: 81 BPM
INR BLD: 1.1 (ref 0.88–1.12)

## 2023-04-04 PROCEDURE — C1732 CATH, EP, DIAG/ABL, 3D/VECT: HCPCS

## 2023-04-04 PROCEDURE — 2580000003 HC RX 258

## 2023-04-04 PROCEDURE — C1730 CATH, EP, 19 OR FEW ELECT: HCPCS

## 2023-04-04 PROCEDURE — 93655 ICAR CATH ABLTJ DSCRT ARRHYT: CPT

## 2023-04-04 PROCEDURE — 93657 TX L/R ATRIAL FIB ADDL: CPT | Performed by: INTERNAL MEDICINE

## 2023-04-04 PROCEDURE — 93005 ELECTROCARDIOGRAM TRACING: CPT | Performed by: INTERNAL MEDICINE

## 2023-04-04 PROCEDURE — 2709999900 HC NON-CHARGEABLE SUPPLY

## 2023-04-04 PROCEDURE — 93622 COMP EP EVAL L VENTR PAC&REC: CPT | Performed by: INTERNAL MEDICINE

## 2023-04-04 PROCEDURE — 7100000001 HC PACU RECOVERY - ADDTL 15 MIN

## 2023-04-04 PROCEDURE — 3700000000 HC ANESTHESIA ATTENDED CARE

## 2023-04-04 PROCEDURE — 2500000003 HC RX 250 WO HCPCS

## 2023-04-04 PROCEDURE — C1894 INTRO/SHEATH, NON-LASER: HCPCS

## 2023-04-04 PROCEDURE — 93010 ELECTROCARDIOGRAM REPORT: CPT | Performed by: INTERNAL MEDICINE

## 2023-04-04 PROCEDURE — 93622 COMP EP EVAL L VENTR PAC&REC: CPT

## 2023-04-04 PROCEDURE — 93656 COMPRE EP EVAL ABLTJ ATR FIB: CPT

## 2023-04-04 PROCEDURE — 3700000001 HC ADD 15 MINUTES (ANESTHESIA)

## 2023-04-04 PROCEDURE — 7100000000 HC PACU RECOVERY - FIRST 15 MIN

## 2023-04-04 PROCEDURE — 6360000002 HC RX W HCPCS

## 2023-04-04 PROCEDURE — C1759 CATH, INTRA ECHOCARDIOGRAPHY: HCPCS

## 2023-04-04 PROCEDURE — 93655 ICAR CATH ABLTJ DSCRT ARRHYT: CPT | Performed by: INTERNAL MEDICINE

## 2023-04-04 PROCEDURE — 93657 TX L/R ATRIAL FIB ADDL: CPT

## 2023-04-04 PROCEDURE — 85610 PROTHROMBIN TIME: CPT

## 2023-04-04 PROCEDURE — 2720000010 HC SURG SUPPLY STERILE

## 2023-04-04 PROCEDURE — 93623 PRGRMD STIMJ&PACG IV RX NFS: CPT

## 2023-04-04 PROCEDURE — 93623 PRGRMD STIMJ&PACG IV RX NFS: CPT | Performed by: INTERNAL MEDICINE

## 2023-04-04 PROCEDURE — 93656 COMPRE EP EVAL ABLTJ ATR FIB: CPT | Performed by: INTERNAL MEDICINE

## 2023-04-04 PROCEDURE — C1893 INTRO/SHEATH, FIXED,NON-PEEL: HCPCS

## 2023-04-04 RX ORDER — PROTAMINE SULFATE 10 MG/ML
30 INJECTION, SOLUTION INTRAVENOUS
Status: DISCONTINUED | OUTPATIENT
Start: 2023-04-04 | End: 2023-04-05 | Stop reason: HOSPADM

## 2023-04-04 RX ORDER — HYDRALAZINE HYDROCHLORIDE 20 MG/ML
10 INJECTION INTRAMUSCULAR; INTRAVENOUS
Status: DISCONTINUED | OUTPATIENT
Start: 2023-04-04 | End: 2023-04-05 | Stop reason: HOSPADM

## 2023-04-04 RX ORDER — MIDAZOLAM HYDROCHLORIDE 1 MG/ML
INJECTION INTRAMUSCULAR; INTRAVENOUS PRN
Status: DISCONTINUED | OUTPATIENT
Start: 2023-04-04 | End: 2023-04-04 | Stop reason: SDUPTHER

## 2023-04-04 RX ORDER — ROCURONIUM BROMIDE 10 MG/ML
INJECTION, SOLUTION INTRAVENOUS PRN
Status: DISCONTINUED | OUTPATIENT
Start: 2023-04-04 | End: 2023-04-04 | Stop reason: SDUPTHER

## 2023-04-04 RX ORDER — DILTIAZEM HYDROCHLORIDE 120 MG/1
120 CAPSULE, COATED, EXTENDED RELEASE ORAL DAILY
Status: DISCONTINUED | OUTPATIENT
Start: 2023-04-04 | End: 2023-04-05 | Stop reason: HOSPADM

## 2023-04-04 RX ORDER — FENTANYL CITRATE 50 UG/ML
INJECTION, SOLUTION INTRAMUSCULAR; INTRAVENOUS PRN
Status: DISCONTINUED | OUTPATIENT
Start: 2023-04-04 | End: 2023-04-04 | Stop reason: SDUPTHER

## 2023-04-04 RX ORDER — SODIUM CHLORIDE 0.9 % (FLUSH) 0.9 %
5-40 SYRINGE (ML) INJECTION EVERY 12 HOURS SCHEDULED
Status: DISCONTINUED | OUTPATIENT
Start: 2023-04-04 | End: 2023-04-05 | Stop reason: HOSPADM

## 2023-04-04 RX ORDER — MEPERIDINE HYDROCHLORIDE 25 MG/ML
12.5 INJECTION INTRAMUSCULAR; INTRAVENOUS; SUBCUTANEOUS AS NEEDED
Status: DISCONTINUED | OUTPATIENT
Start: 2023-04-04 | End: 2023-04-05 | Stop reason: HOSPADM

## 2023-04-04 RX ORDER — PROTAMINE SULFATE 10 MG/ML
INJECTION, SOLUTION INTRAVENOUS PRN
Status: DISCONTINUED | OUTPATIENT
Start: 2023-04-04 | End: 2023-04-04 | Stop reason: SDUPTHER

## 2023-04-04 RX ORDER — LIDOCAINE HYDROCHLORIDE 20 MG/ML
INJECTION, SOLUTION INTRAVENOUS PRN
Status: DISCONTINUED | OUTPATIENT
Start: 2023-04-04 | End: 2023-04-04 | Stop reason: SDUPTHER

## 2023-04-04 RX ORDER — ATORVASTATIN CALCIUM 40 MG/1
40 TABLET, FILM COATED ORAL DAILY
Status: DISCONTINUED | OUTPATIENT
Start: 2023-04-04 | End: 2023-04-05 | Stop reason: HOSPADM

## 2023-04-04 RX ORDER — ONDANSETRON 2 MG/ML
INJECTION INTRAMUSCULAR; INTRAVENOUS PRN
Status: DISCONTINUED | OUTPATIENT
Start: 2023-04-04 | End: 2023-04-04 | Stop reason: SDUPTHER

## 2023-04-04 RX ORDER — DIPHENHYDRAMINE HYDROCHLORIDE 50 MG/ML
12.5 INJECTION INTRAMUSCULAR; INTRAVENOUS
Status: DISCONTINUED | OUTPATIENT
Start: 2023-04-04 | End: 2023-04-05 | Stop reason: HOSPADM

## 2023-04-04 RX ORDER — ONDANSETRON 2 MG/ML
4 INJECTION INTRAMUSCULAR; INTRAVENOUS
Status: DISCONTINUED | OUTPATIENT
Start: 2023-04-04 | End: 2023-04-05 | Stop reason: HOSPADM

## 2023-04-04 RX ORDER — HEPARIN SODIUM 10000 [USP'U]/100ML
INJECTION, SOLUTION INTRAVENOUS CONTINUOUS PRN
Status: DISCONTINUED | OUTPATIENT
Start: 2023-04-04 | End: 2023-04-04 | Stop reason: SDUPTHER

## 2023-04-04 RX ORDER — SODIUM CHLORIDE, SODIUM LACTATE, POTASSIUM CHLORIDE, CALCIUM CHLORIDE 600; 310; 30; 20 MG/100ML; MG/100ML; MG/100ML; MG/100ML
INJECTION, SOLUTION INTRAVENOUS CONTINUOUS PRN
Status: DISCONTINUED | OUTPATIENT
Start: 2023-04-04 | End: 2023-04-04 | Stop reason: SDUPTHER

## 2023-04-04 RX ORDER — 0.9 % SODIUM CHLORIDE 0.9 %
1000 INTRAVENOUS SOLUTION INTRAVENOUS
Status: DISCONTINUED | OUTPATIENT
Start: 2023-04-04 | End: 2023-04-05 | Stop reason: HOSPADM

## 2023-04-04 RX ORDER — ACETAMINOPHEN 325 MG/1
650 TABLET ORAL EVERY 4 HOURS PRN
Status: DISCONTINUED | OUTPATIENT
Start: 2023-04-04 | End: 2023-04-05 | Stop reason: HOSPADM

## 2023-04-04 RX ORDER — FUROSEMIDE 10 MG/ML
INJECTION INTRAMUSCULAR; INTRAVENOUS PRN
Status: DISCONTINUED | OUTPATIENT
Start: 2023-04-04 | End: 2023-04-04 | Stop reason: SDUPTHER

## 2023-04-04 RX ORDER — PROPOFOL 10 MG/ML
INJECTION, EMULSION INTRAVENOUS PRN
Status: DISCONTINUED | OUTPATIENT
Start: 2023-04-04 | End: 2023-04-04 | Stop reason: SDUPTHER

## 2023-04-04 RX ORDER — SODIUM CHLORIDE 0.9 % (FLUSH) 0.9 %
5-40 SYRINGE (ML) INJECTION PRN
Status: DISCONTINUED | OUTPATIENT
Start: 2023-04-04 | End: 2023-04-05 | Stop reason: HOSPADM

## 2023-04-04 RX ORDER — DOPAMINE HYDROCHLORIDE 160 MG/100ML
INJECTION, SOLUTION INTRAVENOUS CONTINUOUS PRN
Status: DISCONTINUED | OUTPATIENT
Start: 2023-04-04 | End: 2023-04-04 | Stop reason: SDUPTHER

## 2023-04-04 RX ORDER — PROCHLORPERAZINE EDISYLATE 5 MG/ML
5 INJECTION INTRAMUSCULAR; INTRAVENOUS
Status: DISCONTINUED | OUTPATIENT
Start: 2023-04-04 | End: 2023-04-05 | Stop reason: HOSPADM

## 2023-04-04 RX ORDER — SODIUM CHLORIDE 9 MG/ML
INJECTION, SOLUTION INTRAVENOUS PRN
Status: DISCONTINUED | OUTPATIENT
Start: 2023-04-04 | End: 2023-04-05 | Stop reason: HOSPADM

## 2023-04-04 RX ORDER — HEPARIN SODIUM 10000 [USP'U]/ML
INJECTION, SOLUTION INTRAVENOUS; SUBCUTANEOUS PRN
Status: DISCONTINUED | OUTPATIENT
Start: 2023-04-04 | End: 2023-04-04 | Stop reason: SDUPTHER

## 2023-04-04 RX ORDER — LISINOPRIL 20 MG/1
20 TABLET ORAL DAILY
Status: DISCONTINUED | OUTPATIENT
Start: 2023-04-04 | End: 2023-04-05 | Stop reason: HOSPADM

## 2023-04-04 RX ORDER — FLECAINIDE ACETATE 50 MG/1
50 TABLET ORAL 2 TIMES DAILY
Status: DISCONTINUED | OUTPATIENT
Start: 2023-04-04 | End: 2023-04-05 | Stop reason: HOSPADM

## 2023-04-04 RX ORDER — LIDOCAINE HYDROCHLORIDE AND EPINEPHRINE BITARTRATE 20; .01 MG/ML; MG/ML
15 INJECTION, SOLUTION SUBCUTANEOUS SEE ADMIN INSTRUCTIONS
Status: DISCONTINUED | OUTPATIENT
Start: 2023-04-04 | End: 2023-04-05 | Stop reason: HOSPADM

## 2023-04-04 RX ORDER — BUSPIRONE HYDROCHLORIDE 5 MG/1
5 TABLET ORAL 2 TIMES DAILY
Status: DISCONTINUED | OUTPATIENT
Start: 2023-04-04 | End: 2023-04-05 | Stop reason: HOSPADM

## 2023-04-04 RX ORDER — DEXAMETHASONE SODIUM PHOSPHATE 4 MG/ML
INJECTION, SOLUTION INTRA-ARTICULAR; INTRALESIONAL; INTRAMUSCULAR; INTRAVENOUS; SOFT TISSUE PRN
Status: DISCONTINUED | OUTPATIENT
Start: 2023-04-04 | End: 2023-04-04 | Stop reason: SDUPTHER

## 2023-04-04 RX ADMIN — FENTANYL CITRATE 50 MCG: 50 INJECTION, SOLUTION INTRAMUSCULAR; INTRAVENOUS at 09:32

## 2023-04-04 RX ADMIN — LIDOCAINE HYDROCHLORIDE 100 MG: 20 INJECTION, SOLUTION INTRAVENOUS at 09:04

## 2023-04-04 RX ADMIN — PHENYLEPHRINE HYDROCHLORIDE 100 MCG: 10 INJECTION INTRAVENOUS at 09:52

## 2023-04-04 RX ADMIN — ROCURONIUM BROMIDE 50 MG: 10 INJECTION INTRAVENOUS at 09:51

## 2023-04-04 RX ADMIN — HEPARIN SODIUM 2000 UNITS: 10000 INJECTION, SOLUTION INTRAVENOUS; SUBCUTANEOUS at 10:26

## 2023-04-04 RX ADMIN — HEPARIN SODIUM 11000 UNITS/HR: 10000 INJECTION, SOLUTION INTRAVENOUS at 10:27

## 2023-04-04 RX ADMIN — SUGAMMADEX 200 MG: 100 INJECTION, SOLUTION INTRAVENOUS at 11:20

## 2023-04-04 RX ADMIN — HEPARIN SODIUM 6000 UNITS: 10000 INJECTION, SOLUTION INTRAVENOUS; SUBCUTANEOUS at 10:04

## 2023-04-04 RX ADMIN — MIDAZOLAM HYDROCHLORIDE 2 MG: 2 INJECTION, SOLUTION INTRAMUSCULAR; INTRAVENOUS at 08:59

## 2023-04-04 RX ADMIN — PROPOFOL 30 MG: 10 INJECTION, EMULSION INTRAVENOUS at 09:09

## 2023-04-04 RX ADMIN — ONDANSETRON 4 MG: 2 INJECTION INTRAMUSCULAR; INTRAVENOUS at 09:20

## 2023-04-04 RX ADMIN — ROCURONIUM BROMIDE 15 MG: 10 INJECTION INTRAVENOUS at 10:59

## 2023-04-04 RX ADMIN — PROPOFOL 170 MG: 10 INJECTION, EMULSION INTRAVENOUS at 09:04

## 2023-04-04 RX ADMIN — FUROSEMIDE 50 MG: 10 INJECTION, SOLUTION INTRAMUSCULAR; INTRAVENOUS at 11:24

## 2023-04-04 RX ADMIN — ROCURONIUM BROMIDE 50 MG: 10 INJECTION INTRAVENOUS at 09:04

## 2023-04-04 RX ADMIN — PHENYLEPHRINE HYDROCHLORIDE 100 MCG: 10 INJECTION INTRAVENOUS at 09:39

## 2023-04-04 RX ADMIN — SODIUM CHLORIDE, SODIUM LACTATE, POTASSIUM CHLORIDE, AND CALCIUM CHLORIDE: .6; .31; .03; .02 INJECTION, SOLUTION INTRAVENOUS at 08:59

## 2023-04-04 RX ADMIN — SODIUM CHLORIDE, SODIUM LACTATE, POTASSIUM CHLORIDE, AND CALCIUM CHLORIDE: .6; .31; .03; .02 INJECTION, SOLUTION INTRAVENOUS at 11:11

## 2023-04-04 RX ADMIN — HEPARIN SODIUM 5500 UNITS: 10000 INJECTION, SOLUTION INTRAVENOUS; SUBCUTANEOUS at 09:53

## 2023-04-04 RX ADMIN — PROPOFOL 50 MG: 10 INJECTION, EMULSION INTRAVENOUS at 09:32

## 2023-04-04 RX ADMIN — ROCURONIUM BROMIDE 15 MG: 10 INJECTION INTRAVENOUS at 10:39

## 2023-04-04 RX ADMIN — DOPAMINE HYDROCHLORIDE IN DEXTROSE 10 MCG/KG/MIN: 1.6 INJECTION, SOLUTION INTRAVENOUS at 11:05

## 2023-04-04 RX ADMIN — PHENYLEPHRINE HYDROCHLORIDE 15 MCG/MIN: 50 INJECTION INTRAVENOUS at 09:58

## 2023-04-04 RX ADMIN — DEXAMETHASONE SODIUM PHOSPHATE 8 MG: 4 INJECTION, SOLUTION INTRAMUSCULAR; INTRAVENOUS at 09:20

## 2023-04-04 RX ADMIN — PROTAMINE SULFATE 50 MG: 10 INJECTION, SOLUTION INTRAVENOUS at 11:23

## 2023-04-04 RX ADMIN — PROTAMINE SULFATE 50 MG: 10 INJECTION, SOLUTION INTRAVENOUS at 11:15

## 2023-04-04 RX ADMIN — FENTANYL CITRATE 50 MCG: 50 INJECTION, SOLUTION INTRAMUSCULAR; INTRAVENOUS at 10:18

## 2023-04-04 RX ADMIN — PHENYLEPHRINE HYDROCHLORIDE 100 MCG: 10 INJECTION INTRAVENOUS at 10:30

## 2023-04-04 RX ADMIN — PROTAMINE SULFATE 50 MG: 10 INJECTION, SOLUTION INTRAVENOUS at 11:19

## 2023-04-04 RX ADMIN — ROCURONIUM BROMIDE 50 MG: 10 INJECTION INTRAVENOUS at 09:32

## 2023-04-04 ASSESSMENT — LIFESTYLE VARIABLES: SMOKING_STATUS: 1

## 2023-04-04 NOTE — PROGRESS NOTES
PACU Transfer Note    Vitals:    04/04/23 1230   BP: 127/85   Pulse: 81   Resp: 19   Temp: 97.1   SpO2: 94%       In: 1000 [I.V.:1000]  Out: 860 [Urine:850]    Pain assessment:  none       Bedside report given to receiving unit RN. Answered all questions. Transported pt back to [facility].      4/4/2023 12:40 PM

## 2023-04-04 NOTE — H&P
pneumothorax), stroke, cardiac perforation, tamponade, need for emergent open heart surgery, need for pacemaker implantation, injury to the phrenic nerve, injury to the esophagus, myocardial infarction and death were discussed in detail. The patient was also counseled at length about the risks of micaela Covid-19 in the blaise-operative and post-operative states including the recovery window of their procedure. The patient was made aware that micaela Covid-19 after a surgical procedure may worsen their prognosis for recovering from the virus and lend to a higher morbidity and or mortality risk. The patient was given the option of postponing their procedure. The patient was also presented reasonable alternatives to the proposed care, treatment, and services. The discussion I have had with the patient encompassed risks, benefits, and side effects related to the alternatives and the risks related to not receiving the proposed care, treatment and services. The patient opted to proceed with the atrial fibrillation ablation. We will schedule for a radiofrequency ablation with Carto Navigation system with a LEVI procedure immediately prior to this ablation. A cardiac CTA will be ordered for pulmonary vein mapping prior to this procedure. We will order BMP, CBC and Type & Screen prior to the procedure. -  Much time was spent counseling the patient on healthier lifestyle, following a low sodium diet <2,400 mg  of sodium a day and dramatically decreasing the intake of processed sugar < 36 grams male. - Patient educated to eat a diet which includes organic fruits, vegetables and meats.     - Encouraged to watch \"That Sugar Film\" which can be found on [employer] and other streaming services, which discusses the negative impact of processed sugar has on the body. Essential hypertension  - Stable. - Continue current medical management. Follow ups:   Follow up three months after procedure with

## 2023-04-04 NOTE — ANESTHESIA PRE PROCEDURE
Department of Anesthesiology  Preprocedure Note       Name:  Zeina Benson   Age:  61 y.o.  :  1965                                          MRN:  [id number]         Date:  2023      Surgeon: * No surgeons listed *    Procedure: * No procedures listed *    Medications prior to admission:   Prior to Admission medications    Medication Sig Start Date End Date Taking?  Authorizing Provider   flecainide (TAMBOCOR) 50 MG tablet Take 1 tablet by mouth 2 times daily 3/2/23   Shy Wiley MD   metoprolol succinate (TOPROL XL) 25 MG extended release tablet Take 1 tablet by mouth daily 23   SHERI Wynn CNP   lisinopril (PRINIVIL;ZESTRIL) 20 MG tablet Take 1 tablet by mouth daily 23   SHERI Sanchez CNP   busPIRone (BUSPAR) 5 MG tablet Take 1 tablet by mouth 2 times daily 23   SHERI Wynn - CNP   dilTIAZem (CARDIZEM CD) 120 MG extended release capsule Take 1 capsule by mouth daily 23   Octavia Peralta DO   meloxicam (MOBIC) 7.5 MG tablet Take 7.5 mg by mouth as needed for Pain    Historical Provider, MD   atorvastatin (LIPITOR) 40 MG tablet Take 1 tablet by mouth daily 22   SHERI Wynn CNP   aspirin EC 81 MG EC tablet Take 1 tablet by mouth 2 times daily 22   Karl Buenrostro MD       Current medications:    Current Outpatient Medications   Medication Sig Dispense Refill    flecainide (TAMBOCOR) 50 MG tablet Take 1 tablet by mouth 2 times daily 60 tablet 3    metoprolol succinate (TOPROL XL) 25 MG extended release tablet Take 1 tablet by mouth daily 90 tablet 0    lisinopril (PRINIVIL;ZESTRIL) 20 MG tablet Take 1 tablet by mouth daily 90 tablet 1    busPIRone (BUSPAR) 5 MG tablet Take 1 tablet by mouth 2 times daily 60 tablet 2    dilTIAZem (CARDIZEM CD) 120 MG extended release capsule Take 1 capsule by mouth daily 30 capsule 3    meloxicam (MOBIC) 7.5 MG tablet Take 7.5 mg by mouth as needed for Pain      atorvastatin

## 2023-04-04 NOTE — PROCEDURES
perforation, tamponade, need for emergent open heart surgery, need for pacemaker implantation, esophageal injury and fistula, myocardial infarction and death were discussed in detail. The patient was also counseled at length about the risks of micaela Covid-19 in the blaise-operative and post-operative states including the recovery window of their procedure. The patient was made aware that micaela Covid-19 after a surgical procedure may worsen their prognosis for recovering from the virus and lend to a higher morbidity and or mortality risk. In a very rare chance of catastrophic complication that cannot be managed at our hospital, there may be the chance of having to transfer the patient to another hospital system for further care. The patient was given the option of postponing their procedure. The patient was also presented reasonable alternatives to the proposed care, treatment, and services. The discussion I have had with the patient encompassed risks, benefits, and side effects related to the alternatives and the risks related to not receiving the proposed care, treatment and services. The patient opted to proceed with the ablation. Written informed consent was signed and placed in the chart. Patient was brought to the EP lab in a fasting non-sedate state. Patient underwent general anesthesia by anesthesia team. The patient was monitored continuously with ECG, pulse oximetry, blood pressure monitoring, and direct observation. No urinary jorge was placed in order to prevent any hematuria or urinary tract infection. Both groins were prepped in a sterile fashion. We gained access in the right femoral vein. One 8 Mosotho short sheath for ICE and subsequently for CS catheters were placed in the right femoral vein using modified seldinger technique. Two 8.5F SLO sheaths were introduced into the right femoral vein using modified Seldinger technique.  Then a CS cathter was placed inside the coronary sinus

## 2023-04-04 NOTE — PROGRESS NOTES
Patient admitted to PACU bed 6 from OR via bed s/p ABLATION A-FIB W COMPLETE EP STUDY. Report received at bedside from CRNA and cath lab staff. Pt was reported to be hemodynamically stable. Pt connected to PACU monitoring equipment, IVF infusing, no pain noted. Patient arrived not fully awake from anesthesia, on O2 @ 2L NC breathing easy and unlabored. R groin soft covered with dressing C/D/I. Will continue to monitor.

## 2023-04-04 NOTE — ANESTHESIA POSTPROCEDURE EVALUATION
Department of Anesthesiology  Postprocedure Note    Patient: Ritesh Batres  MRN: [id number]  YOB: 1965  Date of evaluation: 4/4/2023      Procedure Summary     Date: 04/04/23 Room / Location: [facility] Cath Lab; [facility] Echocardiography    Anesthesia Start: [address] [facility] Anesthesia Stop: 9435    Procedure: [facility] A-FIB ABLATION W ANES Diagnosis:     Scheduled Providers: Lennox Espinosa DO Responsible Provider: Lennox Espinosa DO    Anesthesia Type: general ASA Status: 3          Anesthesia Type: No value filed.     Eladio Phase I: Eladio Score: 10    Eladio Phase II:        Anesthesia Post Evaluation    Patient location during evaluation: PACU  Patient participation: complete - patient participated  Level of consciousness: awake and alert  Pain score: 0  Airway patency: patent  Nausea & Vomiting: no nausea and no vomiting  Complications: no  Cardiovascular status: hemodynamically stable  Respiratory status: acceptable  Hydration status: stable

## 2023-04-04 NOTE — DISCHARGE INSTRUCTIONS
says can also cause problems. Follow directions on the label carefully. If you have questions, talk to your caregiver. Ask your caregiver when to return for a follow-up visit. Keep all appointments. Write down any questions you may have. This way you will remember to ask these questions during your next visit.       FOLLOW-UP APPOINTMENTS    Follow-up with CNP in 1 week

## 2023-05-22 ENCOUNTER — OFFICE VISIT (OUTPATIENT)
Dept: ENT CLINIC | Age: 58
End: 2023-05-22
Payer: COMMERCIAL

## 2023-05-22 ENCOUNTER — PROCEDURE VISIT (OUTPATIENT)
Dept: AUDIOLOGY | Age: 58
End: 2023-05-22
Payer: COMMERCIAL

## 2023-05-22 VITALS
SYSTOLIC BLOOD PRESSURE: 142 MMHG | DIASTOLIC BLOOD PRESSURE: 94 MMHG | BODY MASS INDEX: 34.31 KG/M2 | HEIGHT: 72 IN | HEART RATE: 82 BPM

## 2023-05-22 DIAGNOSIS — H90.3 SENSORINEURAL HEARING LOSS (SNHL) OF BOTH EARS: Primary | ICD-10-CM

## 2023-05-22 DIAGNOSIS — H93.13 TINNITUS OF BOTH EARS: ICD-10-CM

## 2023-05-22 PROCEDURE — 99203 OFFICE O/P NEW LOW 30 MIN: CPT | Performed by: OTOLARYNGOLOGY

## 2023-05-22 PROCEDURE — 92567 TYMPANOMETRY: CPT | Performed by: AUDIOLOGIST

## 2023-05-22 PROCEDURE — G8427 DOCREV CUR MEDS BY ELIG CLIN: HCPCS | Performed by: OTOLARYNGOLOGY

## 2023-05-22 PROCEDURE — 3017F COLORECTAL CA SCREEN DOC REV: CPT | Performed by: OTOLARYNGOLOGY

## 2023-05-22 PROCEDURE — 1036F TOBACCO NON-USER: CPT | Performed by: OTOLARYNGOLOGY

## 2023-05-22 PROCEDURE — 92557 COMPREHENSIVE HEARING TEST: CPT | Performed by: AUDIOLOGIST

## 2023-05-22 PROCEDURE — G8417 CALC BMI ABV UP PARAM F/U: HCPCS | Performed by: OTOLARYNGOLOGY

## 2023-05-22 NOTE — PROGRESS NOTES
APRN - CNP      Degree of   Hearing Sensitivity dB Range   Within Normal Limits (WNL) 0 - 20   Mild 25 - 40   Moderate 45 - 55   Moderately-Severe 60 - 70   Severe 75 - 90   Profound 95 +

## 2023-05-22 NOTE — PROGRESS NOTES
Brisa      Patient Name: [address] [facility] Record Number:  [id number]  Primary Care Physician:  SHERI Umana CNP  Date of Consultation: 5/22/2023    Chief Complaint: Ringing in the [address] is a(n) 61 y.o. male who presents for evaluation of ringing in the ears. The patient said he has had ringing in both ears that is relatively high-frequency for the past 2 months. He does not remember anything specific that occurred to start the ringing. He does not have a significant ear history. He does have significant noise exposure throughout his life. In addition he says that he has been more stressed. He is even try to talk to a therapist, but is looking for a different one. REVIEW OF SYSTEMS  As above    PHYSICAL EXAM  GENERAL: No Acute Distress, Alert and Oriented, no Hoarseness, strong voice  EYES: EOMI, Anti-icteric  HENT:   Head: Normocephalic and atraumatic. Face:  Symmetric, facial nerve intact, no sinus tenderness  Right Ear: Normal external ear, normal external auditory canal, intact tympanic membrane with normal mobility and aerated middle ear  Left Ear: Normal external ear, normal external auditory canal, intact tympanic membrane with normal mobility and aerated middle ear  Mouth/Oral Cavity:  normal lips, Uvula is midline, no mucosal lesions  Oropharynx/Larynx:  normal oropharynx,   Nose:Normal external nasal appearance. NECK: Normal range of motion, no thyromegaly, trachea is midline, no lymphadenopathy, no neck masses, no crepitus    Patient had an audiogram that shows normal sloping to mild sensorineural hearing loss a little worse on the right than left. ASSESSMENT/PLAN  1. Sensorineural hearing loss (SNHL) of both ears  Secondary to noise exposure. Recommended hearing protection.     2. Tinnitus of both ears  The patient is ringing in the ears is secondary to

## 2023-05-22 NOTE — PATIENT INSTRUCTIONS
Medicaid Hearing Aid Providers - [place]    These offices are known to work with some Medicaid plans for hearing aids. They may not work with ALL Medicaid plans. You should contact your insurance provider to find an audiologist for hearing aids near you. Main Office  [address]. [address] [facility]    Voice: [phone]  Video Phone: [phone]  Fax [phone]  Hours:  8:30-5:00  Monday through Friday [facility] Office  [address] [facility] [facility], [address]    Voice [phone]  Fax [phone]    Hours: 8:30-noon and 1:00-5:00  Monday through Friday   [address] [facility]. [address]    Voice [phone]  Fax [phone]    Hours:  8:30-noon and 1:00-5:00  Monday through Friday         [address]  [address] [facility] [place], [address] [place] [address]  Phone: [phone]  Hours:  8:00-5:00  Monday through Friday [address] [place] [address] Physicians Office [facility]  [address] [facility] [address]  [facility], [address]  Phone: [phone]  Hours:  8:00-5:00  Monday through Friday    Good Communication Strategies    Communication can be challenging for anyone, but can be especially difficult for those with some degree of hearing loss. While we may not be able to control every factor that may lead to difficulty with communication, there are Good Communication Strategies that we can all use in our day-to-day lives. Communication takes both parties working together for it to be successful. Tips as a Listener:   Control your environment. It is important to limit the amount of background noise in the room when possible. You should also consider having a good light source in the room to best see the other person. Ask for clarification. Instead of saying \"What?\", you can use parts of what you heard to make a new question.   For

## 2023-07-05 PROBLEM — I48.92 LEFT ATRIAL FLUTTER BY ELECTROCARDIOGRAM (HCC): Status: ACTIVE | Noted: 2023-07-05

## 2023-07-05 PROBLEM — I48.0 PAF (PAROXYSMAL ATRIAL FIBRILLATION) (HCC): Status: ACTIVE | Noted: 2023-02-18

## 2023-07-05 PROBLEM — I10 PRIMARY HYPERTENSION: Status: ACTIVE | Noted: 2023-07-05

## 2023-07-05 NOTE — PROGRESS NOTES
[facility]   Electrophysiology      Date: 7/6/2023    Primary Cardiologist: Qamar Hair MD  PCP: SHERI Brandon CNP     Chief Complaint:   Chief Complaint   Patient presents with    Follow-up     High blood pressure     History of Present Illness:    I saw Edna Murphy in the office for electrophysiology follow up today. He is a 60 y.o. male with a past medical history of HTN, HLD and paroxysmal atrial fibrillation diagnosed in February 2023. He underwent atrial fibrillation and left atrial flutter ablation on 4/4/23 with Dr. Jose Alcantara. He was started on Eliquis 5mg BID, aspirin and Toprol were stopped. He presents today for follow up. He has been feeling generally okay since his ablation. Denies any palpitations or increased dyspnea. No chest pain. No syncope or edema. No bleeding issues. Says his BP is elevated due to being irritated for having to wait but says it is typically controlled when he's not \"pissed off. \"     Allergies:  No Known Allergies  Home Medications:  Prior to Visit Medications    Medication Sig Taking?  Authorizing Provider   apixaban (ELIQUIS) 5 MG TABS tablet Take 1 tablet by mouth 2 times daily Yes Frantz Mclain MD   lisinopril (PRINIVIL;ZESTRIL) 20 MG tablet Take 1 tablet by mouth daily Yes SHERI Miller CNP   dilTIAZem (CARDIZEM CD) 120 MG extended release capsule Take 1 capsule by mouth daily Yes Octavia Peralta,    meloxicam (MOBIC) 7.5 MG tablet Take 1 tablet by mouth as needed for Pain Yes Historical Provider, MD   atorvastatin (LIPITOR) 40 MG tablet Take 1 tablet by mouth daily Yes SHERI Brandon CNP        Past Medical History:  Past Medical History:   Diagnosis Date    COVID-19 March 2022, residual cough    Hyperlipidemia     Hypertension     Medial meniscus tear     Pneumonia        Past Surgical History:   Past Surgical History:   Procedure Laterality Date    APPENDECTOMY      COLONOSCOPY      KNEE ARTHROSCOPY Right 11/2/2022    RIGHT

## 2023-07-06 ENCOUNTER — OFFICE VISIT (OUTPATIENT)
Dept: CARDIOLOGY CLINIC | Age: 58
End: 2023-07-06
Payer: COMMERCIAL

## 2023-07-06 VITALS
BODY MASS INDEX: 33.86 KG/M2 | SYSTOLIC BLOOD PRESSURE: 142 MMHG | HEIGHT: 72 IN | OXYGEN SATURATION: 98 % | WEIGHT: 250 LBS | HEART RATE: 78 BPM | DIASTOLIC BLOOD PRESSURE: 98 MMHG

## 2023-07-06 DIAGNOSIS — I48.92 LEFT ATRIAL FLUTTER BY ELECTROCARDIOGRAM (HCC): ICD-10-CM

## 2023-07-06 DIAGNOSIS — I48.0 PAF (PAROXYSMAL ATRIAL FIBRILLATION) (HCC): Primary | ICD-10-CM

## 2023-07-06 DIAGNOSIS — I10 PRIMARY HYPERTENSION: ICD-10-CM

## 2023-07-06 PROCEDURE — 3077F SYST BP >= 140 MM HG: CPT | Performed by: NURSE PRACTITIONER

## 2023-07-06 PROCEDURE — 1036F TOBACCO NON-USER: CPT | Performed by: NURSE PRACTITIONER

## 2023-07-06 PROCEDURE — 99214 OFFICE O/P EST MOD 30 MIN: CPT | Performed by: NURSE PRACTITIONER

## 2023-07-06 PROCEDURE — 93000 ELECTROCARDIOGRAM COMPLETE: CPT | Performed by: NURSE PRACTITIONER

## 2023-07-06 PROCEDURE — G8427 DOCREV CUR MEDS BY ELIG CLIN: HCPCS | Performed by: NURSE PRACTITIONER

## 2023-07-06 PROCEDURE — 3080F DIAST BP >= 90 MM HG: CPT | Performed by: NURSE PRACTITIONER

## 2023-07-06 PROCEDURE — 3017F COLORECTAL CA SCREEN DOC REV: CPT | Performed by: NURSE PRACTITIONER

## 2023-07-06 PROCEDURE — G8417 CALC BMI ABV UP PARAM F/U: HCPCS | Performed by: NURSE PRACTITIONER

## 2023-07-06 ASSESSMENT — ENCOUNTER SYMPTOMS
BLOOD IN STOOL: 0
SHORTNESS OF BREATH: 0
SINUS PRESSURE: 0
VOMITING: 0
DIARRHEA: 0
CONSTIPATION: 0
WHEEZING: 0
TROUBLE SWALLOWING: 0
NAUSEA: 0
BACK PAIN: 0
ABDOMINAL PAIN: 0
COUGH: 0
SORE THROAT: 0
COLOR CHANGE: 0

## 2023-08-19 DIAGNOSIS — I48.0 PAF (PAROXYSMAL ATRIAL FIBRILLATION) (HCC): ICD-10-CM

## 2023-08-19 DIAGNOSIS — I48.0 PAROXYSMAL ATRIAL FIBRILLATION (HCC): Primary | ICD-10-CM

## 2023-08-19 DIAGNOSIS — I48.92 LEFT ATRIAL FLUTTER BY ELECTROCARDIOGRAM (HCC): ICD-10-CM

## 2023-09-01 ENCOUNTER — HOSPITAL ENCOUNTER (OUTPATIENT)
Dept: MRI IMAGING | Age: 58
Discharge: HOME | End: 2023-09-01
Payer: COMMERCIAL

## 2023-09-01 DIAGNOSIS — R47.01 EXPRESSIVE APHASIA: ICD-10-CM

## 2023-09-01 DIAGNOSIS — Z86.73 HISTORY OF CVA (CEREBROVASCULAR ACCIDENT): ICD-10-CM

## 2023-09-01 DIAGNOSIS — E78.2 MIXED HYPERLIPIDEMIA: ICD-10-CM

## 2023-09-01 DIAGNOSIS — I10 PRIMARY HYPERTENSION: ICD-10-CM

## 2023-09-01 PROCEDURE — 70551 MRI BRAIN STEM W/O DYE: CPT

## 2023-09-08 NOTE — PROGRESS NOTES
therapy    Thank you for allowing to us to participate in the care of Paxton Cervantes. Return in about 1 year (around 9/12/2024) for an appointment with Juan Miguel Hubbard NP.      SHERI Reynaga  [address], [address], [address]  Phone: [phone]  Fax: [phone]    Electronically signed by SHERI Aviels CNP on 9/12/2023 at 4:24 PM

## 2023-09-12 ENCOUNTER — OFFICE VISIT (OUTPATIENT)
Dept: CARDIOLOGY CLINIC | Age: 58
End: 2023-09-12
Payer: COMMERCIAL

## 2023-09-12 VITALS
BODY MASS INDEX: 33.32 KG/M2 | DIASTOLIC BLOOD PRESSURE: 94 MMHG | OXYGEN SATURATION: 97 % | HEART RATE: 57 BPM | WEIGHT: 246 LBS | HEIGHT: 72 IN | SYSTOLIC BLOOD PRESSURE: 132 MMHG

## 2023-09-12 DIAGNOSIS — I48.92 LEFT ATRIAL FLUTTER BY ELECTROCARDIOGRAM (HCC): ICD-10-CM

## 2023-09-12 DIAGNOSIS — I10 PRIMARY HYPERTENSION: ICD-10-CM

## 2023-09-12 DIAGNOSIS — I48.0 PAF (PAROXYSMAL ATRIAL FIBRILLATION) (HCC): Primary | ICD-10-CM

## 2023-09-12 PROCEDURE — 3080F DIAST BP >= 90 MM HG: CPT | Performed by: NURSE PRACTITIONER

## 2023-09-12 PROCEDURE — 93000 ELECTROCARDIOGRAM COMPLETE: CPT | Performed by: NURSE PRACTITIONER

## 2023-09-12 PROCEDURE — 3075F SYST BP GE 130 - 139MM HG: CPT | Performed by: NURSE PRACTITIONER

## 2023-09-12 PROCEDURE — 1036F TOBACCO NON-USER: CPT | Performed by: NURSE PRACTITIONER

## 2023-09-12 PROCEDURE — 99214 OFFICE O/P EST MOD 30 MIN: CPT | Performed by: NURSE PRACTITIONER

## 2023-09-12 PROCEDURE — G8427 DOCREV CUR MEDS BY ELIG CLIN: HCPCS | Performed by: NURSE PRACTITIONER

## 2023-09-12 PROCEDURE — 3017F COLORECTAL CA SCREEN DOC REV: CPT | Performed by: NURSE PRACTITIONER

## 2023-09-12 PROCEDURE — G8417 CALC BMI ABV UP PARAM F/U: HCPCS | Performed by: NURSE PRACTITIONER

## 2023-09-12 RX ORDER — HYDROCHLOROTHIAZIDE 25 MG/1
25 TABLET ORAL EVERY MORNING
Qty: 90 TABLET | Refills: 1 | Status: SHIPPED | OUTPATIENT
Start: 2023-09-12

## 2023-09-12 ASSESSMENT — ENCOUNTER SYMPTOMS
ABDOMINAL PAIN: 0
CONSTIPATION: 0
SORE THROAT: 0
COUGH: 0
VOMITING: 0
TROUBLE SWALLOWING: 0
BLOOD IN STOOL: 0
BACK PAIN: 0
SHORTNESS OF BREATH: 0
WHEEZING: 0
NAUSEA: 0
DIARRHEA: 0
COLOR CHANGE: 0
SINUS PRESSURE: 0

## 2023-09-13 ENCOUNTER — OFFICE VISIT (OUTPATIENT)
Dept: ENT CLINIC | Age: 58
End: 2023-09-13
Payer: COMMERCIAL

## 2023-09-13 VITALS — BODY MASS INDEX: 33.18 KG/M2 | HEIGHT: 72 IN | WEIGHT: 245 LBS

## 2023-09-13 DIAGNOSIS — J33.9 NASAL POLYP: Primary | ICD-10-CM

## 2023-09-13 DIAGNOSIS — J32.9 CHRONIC SINUSITIS, UNSPECIFIED LOCATION: ICD-10-CM

## 2023-09-13 DIAGNOSIS — R09.81 NASAL CONGESTION: ICD-10-CM

## 2023-09-13 PROCEDURE — 99204 OFFICE O/P NEW MOD 45 MIN: CPT | Performed by: OTOLARYNGOLOGY

## 2023-09-13 PROCEDURE — 1036F TOBACCO NON-USER: CPT | Performed by: OTOLARYNGOLOGY

## 2023-09-13 PROCEDURE — G8427 DOCREV CUR MEDS BY ELIG CLIN: HCPCS | Performed by: OTOLARYNGOLOGY

## 2023-09-13 PROCEDURE — 3017F COLORECTAL CA SCREEN DOC REV: CPT | Performed by: OTOLARYNGOLOGY

## 2023-09-13 PROCEDURE — 31231 NASAL ENDOSCOPY DX: CPT | Performed by: OTOLARYNGOLOGY

## 2023-09-13 PROCEDURE — G8417 CALC BMI ABV UP PARAM F/U: HCPCS | Performed by: OTOLARYNGOLOGY

## 2023-09-13 NOTE — PROGRESS NOTES
sinusitis        PROCEDURE  Nasal endoscopy  Lidocaine were applied the bilateral nasal cavity. Right scope used to visualize left nasal cavity. There was no polyps or purulent drainage on the left side. On the right side he has a large polyp in the middle meatus extending into the nasopharynx. There was no significant purulent drainage. ASSESSMENT/PLAN  1. Nasal polyp  We discussed management of this polyp. It appears to be benign, but it could be harboring something more sinister such as an inverting papilloma. He does have nasal congestion and it probably is playing a role on the right side. He does however have some medical issues. He has had strokes in the past.  It appears as though he had A-fib that was treated with ablation. His recent cardiology note suggested that he may be able to come off the A-fib. Discussed either observation or removal.  The patient would prefer to have it removed. I told him I think this is reasonable given the recent cardiology note. We will tentatively plan to perform a right-sided sinus surgery to remove this polyp and make sure it is nothing like a cancer inverting papilloma. He understands the risk of sinus surgery including recurrence of the polyp, ongoing congestion and nosebleed. He will have to come off the blood thinners. I will have him get clearance with cardiology prior to surgery. 2. Chronic sinusitis, unspecified location  He does have some trace sinusitis on the left side, but this is minimal.  I think we need to just concentrate on getting the polyp out especially with his blood thinner use. 3. Nasal congestion  I think it would at least help some with the congestion on the right side. I have performed a head and neck physical exam personally or was physically present during the key or critical portions of the service. This note was generated completely or in part utilizing Dragon dictation speech recognition software.

## 2023-09-14 NOTE — PROGRESS NOTES
Cardiology Consultation     Yamilex Elkins  1965    PCP: SHERI Ashby - CNP  Referring Physician: Michi Cruz CNP   Reason for Referral: Chest pain   Chief Complaint: \"I feel good\"      Subjective:   History of Present Illness: The patient is 60 y.o. male with a past medical history significant for hypertension, paroxysmal atrial fibrillation, and hyperlipidemia. He follows with Dr. Chino Morris in EP for atrial fibrillation s/p ablation 23. Today patient presents for follow up. Patient using a cane to help with ambulation from a previous work injury. Patient currently denies any weight gain, edema, palpitations, chest pain, shortness of breath, dizziness, and syncope. He has never been a smoker. His father experienced a heart attack at 55 and passed from cardiac causes at 47. Past Medical History:   Diagnosis Date    COVID-2022, residual cough    Hyperlipidemia     Hypertension     Medial meniscus tear     Pneumonia     Syncope and collapse      Past Surgical History:   Procedure Laterality Date    APPENDECTOMY      COLONOSCOPY      KNEE ARTHROSCOPY Right 2022    RIGHT KNEE ARTHROSCOPY WITH MEDIAL MENISCUS REPAIR performed by Saroj Carreno MD at [facility]       Family History   Problem Relation Age of Onset    Kidney Disease Mother     Heart Failure Mother     Heart Attack Father 52    Heart Disease Father     Kidney Disease Brother      Social History     Tobacco Use    Smoking status: Former     Packs/day: 0.50     Years: 20.00     Additional pack years: 0.00     Total pack years: 10.00     Types: Cigarettes     Quit date:      Years since quittin.7    Smokeless tobacco: Never   Substance Use Topics    Alcohol use:  Yes     Alcohol/week: 6.0 standard drinks of alcohol     Types: 6 Cans of beer per week     Comment: 1 beer daily    Drug use: No       Allergies   Allergen Reactions    Oxycodone-Acetaminophen

## 2023-09-15 ENCOUNTER — TELEPHONE (OUTPATIENT)
Dept: CARDIOLOGY CLINIC | Age: 58
End: 2023-09-15

## 2023-09-15 NOTE — TELEPHONE ENCOUNTER
CARDIAC CLEARANCE     What type of procedure are you having? Nasal polyp     Which physician is performing your procedure? Dr. Estevez Orfranko    When is your procedure scheduled for? Will schedule after Cleared      Where are you having this procedure? [facility]     Are you taking Blood Thinners? Yes    If so what? (Name/dose/frequesncy)  Cristi    Does the surgeon want you to stop your blood thinner?   Yes     If so for how long? 5 to 7 days     Phone Number and Contact Name for Physicians office: Shadi Avila [phone]    Fax number to send information:    [phone]

## 2023-09-18 NOTE — TELEPHONE ENCOUNTER
LOV: 10/2/2022, Next OV: 9/25/2023  Please advise for cardiac clearance. Procedure: Nasal Polyp removal, not scheduled until after clearance. Hx: HTN, HLD, PAF  Patient on Eliquis, requesting to hold 5-7 days.

## 2023-09-18 NOTE — PROGRESS NOTES
[facility] Pre-Admission Testing Electronic Communication Worksheet for OR/ENDO Procedures        Patient: Lauren Linares    DOS:  10/3/23    Arrival Time: 740    Surgery Time:940    Meds to Bed:  [x] YES    []  NO    Transportation Confirmed: [x] YES    []  NO    History and Physical:  [x] YES    []  NO  [] N/A  If yes, please list doctor or Urgent Care and date of H&P:     Additional Clearance(Cardiac, Pulmonary, etc):  [] YES    []  NO- Pt to see Cardiac MD on 9/25    Pre-Admission Testing Visit:  [] YES    [x]  NO If no, do labs/testing need to be done DOS?   [] YES    []  NO    Medication Reconciliation Complete:  [x] YES    []  NO        Additional Notes:                Interview Complete: [x] YES    []  NO          Greta Cabot, RN  12:10 PM

## 2023-09-18 NOTE — PROGRESS NOTES
Follow Up Prior to Surgery    DOS: 10/3  :1965    Cardiac Clearance:  Pt to see Gloria on  9am    UPDATE:23: DR. AGEE'S OFFICE CALLED REGARDING OFFICE NOTE FROM YESTERDAY FOR HIS CARDIAC CLEARANCE IS NOT FINISHED IN Epic AND NO CLEARANCE NOTED-OFFICE TO LET RN OF DR. AGEE AWARE    UPDATE:23: DR. AGEE'S OFFICE CALLED BACK AND SAID DR. AGEE CLEARED HIM BACK ON  23-CLEARANCE LETTER IN MEDIA

## 2023-09-18 NOTE — PROGRESS NOTES
C-diff Questionnaire:     * Admitted with diarrhea? [] YES    [x]  NO     *Prior history of C-Diff. In last 3 months? [] YES    [x]  NO     *Antibiotic use in the past 6-8 weeks? []  NO    [x]  YES      If yes, which: REASON_________________     *Prior hospitalization or nursing home in the last month? []  YES    [x]  NO     [facility] FIRST. .call before you fall    [address] time__740__________        Surgery time______940______    Do not eat or drink anything after 12:00 midnight prior to your surgery. This includes water chewing gum, mints and ice chips- the Day of Surgery. You may brush your teeth and gargle the morning of your surgery, but do not swallow the water     Please see your family doctor/pediatrician for a history and physical and/or questions concerning medications. Bring any test results/reports from your physicians office. If you are under the care of a heart doctor or specialist doctor, please be aware that you may be asked to them for clearance    You may be asked to stop blood thinners such as Coumadin, Plavix, Fragmin, Lovenox, etc., or any anti-inflammatories such as:  Aspirin, Ibuprofen, Advil, Naproxen prior to your surgery. We also ask that you stop any OTC medications such as fish oil, vitamin E, glucosamine, garlic, Multivitamins, COQ 10, etc.    We ask that you do not smoke 24 hours prior to surgery  We ask that you do not  drink any alcoholic beverages 24 hours prior to surgery     You must make arrangements for a responsible adult to take you home after your surgery. For your safety you will not be allowed to leave alone or drive yourself home. Your surgery will be cancelled if you do not have a ride home. Also for your safety, it is strongly suggested that someone stay with you the first 24 hours after your surgery.      A parent or legal guardian must accompany a child scheduled for surgery and

## 2023-09-19 NOTE — TELEPHONE ENCOUNTER
Elizabeth Rodriguez MD  [facility], RN 43 minutes ago (10:10 AM)       Low risk and okay to hold

## 2023-09-25 ENCOUNTER — OFFICE VISIT (OUTPATIENT)
Dept: CARDIOLOGY CLINIC | Age: 58
End: 2023-09-25

## 2023-09-25 VITALS
DIASTOLIC BLOOD PRESSURE: 82 MMHG | OXYGEN SATURATION: 97 % | SYSTOLIC BLOOD PRESSURE: 138 MMHG | TEMPERATURE: 97.7 F | HEART RATE: 84 BPM | BODY MASS INDEX: 33.38 KG/M2 | HEIGHT: 72 IN | WEIGHT: 246.4 LBS

## 2023-09-25 DIAGNOSIS — I10 PRIMARY HYPERTENSION: ICD-10-CM

## 2023-09-25 DIAGNOSIS — E78.2 MIXED HYPERLIPIDEMIA: ICD-10-CM

## 2023-09-25 DIAGNOSIS — I48.19 PERSISTENT ATRIAL FIBRILLATION (HCC): ICD-10-CM

## 2023-09-25 DIAGNOSIS — I48.0 PAF (PAROXYSMAL ATRIAL FIBRILLATION) (HCC): Primary | ICD-10-CM

## 2023-09-25 RX ORDER — ASPIRIN 81 MG/1
81 TABLET ORAL DAILY
Qty: 90 TABLET | Refills: 1
Start: 2023-09-25

## 2023-09-25 RX ORDER — DILTIAZEM HYDROCHLORIDE 120 MG/1
120 CAPSULE, COATED, EXTENDED RELEASE ORAL DAILY
Qty: 90 CAPSULE | Refills: 2 | Status: SHIPPED | OUTPATIENT
Start: 2023-09-25

## 2023-09-25 RX ORDER — ATORVASTATIN CALCIUM 40 MG/1
40 TABLET, FILM COATED ORAL DAILY
Qty: 90 TABLET | Refills: 2 | Status: SHIPPED | OUTPATIENT
Start: 2023-09-25

## 2023-09-26 ENCOUNTER — OFFICE VISIT (OUTPATIENT)
Dept: NEUROLOGY | Age: 58
End: 2023-09-26
Payer: COMMERCIAL

## 2023-09-26 VITALS
HEART RATE: 90 BPM | WEIGHT: 245 LBS | BODY MASS INDEX: 33.23 KG/M2 | DIASTOLIC BLOOD PRESSURE: 85 MMHG | SYSTOLIC BLOOD PRESSURE: 130 MMHG

## 2023-09-26 DIAGNOSIS — G25.81 RESTLESS LEGS SYNDROME (RLS): ICD-10-CM

## 2023-09-26 DIAGNOSIS — R41.3 MEMORY LOSS: ICD-10-CM

## 2023-09-26 DIAGNOSIS — I48.0 PAF (PAROXYSMAL ATRIAL FIBRILLATION) (HCC): ICD-10-CM

## 2023-09-26 DIAGNOSIS — R47.01 APHASIA: Primary | ICD-10-CM

## 2023-09-26 DIAGNOSIS — I10 HTN (HYPERTENSION), BENIGN: ICD-10-CM

## 2023-09-26 DIAGNOSIS — G47.33 OSA (OBSTRUCTIVE SLEEP APNEA): ICD-10-CM

## 2023-09-26 PROCEDURE — 3075F SYST BP GE 130 - 139MM HG: CPT | Performed by: PSYCHIATRY & NEUROLOGY

## 2023-09-26 PROCEDURE — G8427 DOCREV CUR MEDS BY ELIG CLIN: HCPCS | Performed by: PSYCHIATRY & NEUROLOGY

## 2023-09-26 PROCEDURE — 1036F TOBACCO NON-USER: CPT | Performed by: PSYCHIATRY & NEUROLOGY

## 2023-09-26 PROCEDURE — 99203 OFFICE O/P NEW LOW 30 MIN: CPT | Performed by: PSYCHIATRY & NEUROLOGY

## 2023-09-26 PROCEDURE — 3079F DIAST BP 80-89 MM HG: CPT | Performed by: PSYCHIATRY & NEUROLOGY

## 2023-09-26 PROCEDURE — 3017F COLORECTAL CA SCREEN DOC REV: CPT | Performed by: PSYCHIATRY & NEUROLOGY

## 2023-09-26 PROCEDURE — G8417 CALC BMI ABV UP PARAM F/U: HCPCS | Performed by: PSYCHIATRY & NEUROLOGY

## 2023-09-26 NOTE — PROGRESS NOTES
The patient is a 60y.o. years old male who  was referred by SHERI Charlton CNP  for consultation regarding speech impairment    HPI:  The patient describes intermittent speech impairment and word finding status for the last year or so. Symptoms wax and wane but not persistent. Degree is mild and Duration seconds to minutes. Description according to his family, difficulties with remembering names of object but he is able to recall such names of a few seconds. Had MRI of the brain last month which showed small vessel disease. Recent CTA from several months ago was unremarkable. History of A-fib with recent ablation. He is to be on Eliquis and switch to aspirin Lipitor few days ago from his cardiologist.  He feels today back to his baseline. No residual deficit. He is complaining of intermittent short memory loss to recent events but not daily or frequent. No family history dementia. No history of trauma with LOC. Patient had difficulties with chronic insomnia as well as waking a few times at night. Some mild snoring but no witnessed apnea. Does feel fatigued and tired when he wakes up. No other parasomnia. He does have significant restless leg feeling before he goes to sleep and the urge to move his legs before he goes to bed. It can affect interfere with her sleep. Occasional leg kicking injection and sleep. He has not been evaluated for sleep apnea. He denies recent weight changes. He does have chronic depression but no suicidal ideation. He is on few blood pressure medications. Other review of system was unremarkable.           ROS : A 10-14 system review of constitutional, cardiovascular, respiratory, GI, eyes, , ENT, musculoskeletal, endocrine, skin, SHEENT, genitourinary, psychiatric and neurologic systems was obtained and updated today and is unremarkable except as mentioned in my HPI        Exam:   Constitutional:   Vitals:    09/26/23 1536   BP: 130/85   Pulse: 90   Weight:

## 2023-09-27 LAB
FOLATE SERPL-MCNC: 11.32 NG/ML (ref 4.78–24.2)
VIT B12 SERPL-MCNC: 420 PG/ML (ref 211–911)

## 2023-10-02 ENCOUNTER — ANESTHESIA EVENT (OUTPATIENT)
Dept: OPERATING ROOM | Age: 58
End: 2023-10-02
Payer: COMMERCIAL

## 2023-10-03 ENCOUNTER — HOSPITAL ENCOUNTER (OUTPATIENT)
Age: 58
Setting detail: OUTPATIENT SURGERY
Discharge: HOME | End: 2023-10-03
Attending: OTOLARYNGOLOGY | Admitting: OTOLARYNGOLOGY
Payer: COMMERCIAL

## 2023-10-03 ENCOUNTER — ANESTHESIA (OUTPATIENT)
Dept: OPERATING ROOM | Age: 58
End: 2023-10-03
Payer: COMMERCIAL

## 2023-10-03 VITALS
WEIGHT: 240.96 LBS | DIASTOLIC BLOOD PRESSURE: 76 MMHG | TEMPERATURE: 97 F | SYSTOLIC BLOOD PRESSURE: 131 MMHG | HEART RATE: 78 BPM | HEIGHT: 72 IN | RESPIRATION RATE: 18 BRPM | BODY MASS INDEX: 32.64 KG/M2 | OXYGEN SATURATION: 97 %

## 2023-10-03 DIAGNOSIS — J33.9 NASAL POLYP: ICD-10-CM

## 2023-10-03 DIAGNOSIS — J32.9 CHRONIC SINUSITIS, UNSPECIFIED LOCATION: ICD-10-CM

## 2023-10-03 DIAGNOSIS — R09.81 NASAL CONGESTION: ICD-10-CM

## 2023-10-03 PROCEDURE — 7100000001 HC PACU RECOVERY - ADDTL 15 MIN: Performed by: OTOLARYNGOLOGY

## 2023-10-03 PROCEDURE — A4217 STERILE WATER/SALINE, 500 ML: HCPCS | Performed by: OTOLARYNGOLOGY

## 2023-10-03 PROCEDURE — 3600000015 HC SURGERY LEVEL 5 ADDTL 15MIN: Performed by: OTOLARYNGOLOGY

## 2023-10-03 PROCEDURE — 2709999900 HC NON-CHARGEABLE SUPPLY: Performed by: OTOLARYNGOLOGY

## 2023-10-03 PROCEDURE — 6360000002 HC RX W HCPCS

## 2023-10-03 PROCEDURE — 2500000003 HC RX 250 WO HCPCS

## 2023-10-03 PROCEDURE — 2580000003 HC RX 258: Performed by: OTOLARYNGOLOGY

## 2023-10-03 PROCEDURE — 94760 N-INVAS EAR/PLS OXIMETRY 1: CPT

## 2023-10-03 PROCEDURE — 88305 TISSUE EXAM BY PATHOLOGIST: CPT

## 2023-10-03 PROCEDURE — 3700000001 HC ADD 15 MINUTES (ANESTHESIA): Performed by: OTOLARYNGOLOGY

## 2023-10-03 PROCEDURE — 31237 NSL/SINS NDSC SURG BX POLYPC: CPT | Performed by: OTOLARYNGOLOGY

## 2023-10-03 PROCEDURE — 3600000005 HC SURGERY LEVEL 5 BASE: Performed by: OTOLARYNGOLOGY

## 2023-10-03 PROCEDURE — 2720000010 HC SURG SUPPLY STERILE: Performed by: OTOLARYNGOLOGY

## 2023-10-03 PROCEDURE — 30117 REMOVAL OF INTRANASAL LESION: CPT | Performed by: OTOLARYNGOLOGY

## 2023-10-03 PROCEDURE — 7100000010 HC PHASE II RECOVERY - FIRST 15 MIN: Performed by: OTOLARYNGOLOGY

## 2023-10-03 PROCEDURE — 2500000003 HC RX 250 WO HCPCS: Performed by: OTOLARYNGOLOGY

## 2023-10-03 PROCEDURE — 7100000011 HC PHASE II RECOVERY - ADDTL 15 MIN: Performed by: OTOLARYNGOLOGY

## 2023-10-03 PROCEDURE — 7100000000 HC PACU RECOVERY - FIRST 15 MIN: Performed by: OTOLARYNGOLOGY

## 2023-10-03 PROCEDURE — 3700000000 HC ANESTHESIA ATTENDED CARE: Performed by: OTOLARYNGOLOGY

## 2023-10-03 PROCEDURE — 6370000000 HC RX 637 (ALT 250 FOR IP): Performed by: OTOLARYNGOLOGY

## 2023-10-03 PROCEDURE — 2580000003 HC RX 258: Performed by: ANESTHESIOLOGY

## 2023-10-03 RX ORDER — SODIUM CHLORIDE 9 MG/ML
INJECTION, SOLUTION INTRAVENOUS PRN
Status: DISCONTINUED | OUTPATIENT
Start: 2023-10-03 | End: 2023-10-03 | Stop reason: HOSPADM

## 2023-10-03 RX ORDER — OXYMETAZOLINE HYDROCHLORIDE 0.05 G/100ML
SPRAY NASAL
Status: COMPLETED | OUTPATIENT
Start: 2023-10-03 | End: 2023-10-03

## 2023-10-03 RX ORDER — SODIUM CHLORIDE 0.9 % (FLUSH) 0.9 %
5-40 SYRINGE (ML) INJECTION EVERY 12 HOURS SCHEDULED
Status: DISCONTINUED | OUTPATIENT
Start: 2023-10-03 | End: 2023-10-03 | Stop reason: HOSPADM

## 2023-10-03 RX ORDER — MIDAZOLAM HYDROCHLORIDE 1 MG/ML
INJECTION INTRAMUSCULAR; INTRAVENOUS PRN
Status: DISCONTINUED | OUTPATIENT
Start: 2023-10-03 | End: 2023-10-03 | Stop reason: SDUPTHER

## 2023-10-03 RX ORDER — FENTANYL CITRATE 0.05 MG/ML
25 INJECTION, SOLUTION INTRAMUSCULAR; INTRAVENOUS EVERY 5 MIN PRN
Status: DISCONTINUED | OUTPATIENT
Start: 2023-10-03 | End: 2023-10-03 | Stop reason: HOSPADM

## 2023-10-03 RX ORDER — PROPOFOL 10 MG/ML
INJECTION, EMULSION INTRAVENOUS PRN
Status: DISCONTINUED | OUTPATIENT
Start: 2023-10-03 | End: 2023-10-03 | Stop reason: SDUPTHER

## 2023-10-03 RX ORDER — FENTANYL CITRATE 0.05 MG/ML
50 INJECTION, SOLUTION INTRAMUSCULAR; INTRAVENOUS EVERY 5 MIN PRN
Status: DISCONTINUED | OUTPATIENT
Start: 2023-10-03 | End: 2023-10-03 | Stop reason: HOSPADM

## 2023-10-03 RX ORDER — MEPERIDINE HYDROCHLORIDE 25 MG/ML
12.5 INJECTION INTRAMUSCULAR; INTRAVENOUS; SUBCUTANEOUS EVERY 5 MIN PRN
Status: DISCONTINUED | OUTPATIENT
Start: 2023-10-03 | End: 2023-10-03 | Stop reason: HOSPADM

## 2023-10-03 RX ORDER — ONDANSETRON 2 MG/ML
4 INJECTION INTRAMUSCULAR; INTRAVENOUS
Status: DISCONTINUED | OUTPATIENT
Start: 2023-10-03 | End: 2023-10-03 | Stop reason: HOSPADM

## 2023-10-03 RX ORDER — PHENYLEPHRINE HCL IN 0.9% NACL 1 MG/10 ML
SYRINGE (ML) INTRAVENOUS PRN
Status: DISCONTINUED | OUTPATIENT
Start: 2023-10-03 | End: 2023-10-03 | Stop reason: SDUPTHER

## 2023-10-03 RX ORDER — HYDROCODONE BITARTRATE AND ACETAMINOPHEN 5; 325 MG/1; MG/1
1 TABLET ORAL EVERY 6 HOURS PRN
Qty: 10 TABLET | Refills: 0 | Status: SHIPPED | OUTPATIENT
Start: 2023-10-03 | End: 2023-10-06

## 2023-10-03 RX ORDER — ONDANSETRON 2 MG/ML
INJECTION INTRAMUSCULAR; INTRAVENOUS PRN
Status: DISCONTINUED | OUTPATIENT
Start: 2023-10-03 | End: 2023-10-03 | Stop reason: SDUPTHER

## 2023-10-03 RX ORDER — MAGNESIUM HYDROXIDE 1200 MG/15ML
LIQUID ORAL CONTINUOUS PRN
Status: COMPLETED | OUTPATIENT
Start: 2023-10-03 | End: 2023-10-03

## 2023-10-03 RX ORDER — ROCURONIUM BROMIDE 10 MG/ML
INJECTION, SOLUTION INTRAVENOUS PRN
Status: DISCONTINUED | OUTPATIENT
Start: 2023-10-03 | End: 2023-10-03 | Stop reason: SDUPTHER

## 2023-10-03 RX ORDER — SODIUM CHLORIDE 0.9 % (FLUSH) 0.9 %
5-40 SYRINGE (ML) INJECTION PRN
Status: DISCONTINUED | OUTPATIENT
Start: 2023-10-03 | End: 2023-10-03 | Stop reason: HOSPADM

## 2023-10-03 RX ORDER — FENTANYL CITRATE 50 UG/ML
INJECTION, SOLUTION INTRAMUSCULAR; INTRAVENOUS PRN
Status: DISCONTINUED | OUTPATIENT
Start: 2023-10-03 | End: 2023-10-03 | Stop reason: SDUPTHER

## 2023-10-03 RX ORDER — LIDOCAINE HYDROCHLORIDE 20 MG/ML
INJECTION, SOLUTION EPIDURAL; INFILTRATION; INTRACAUDAL; PERINEURAL PRN
Status: DISCONTINUED | OUTPATIENT
Start: 2023-10-03 | End: 2023-10-03 | Stop reason: SDUPTHER

## 2023-10-03 RX ORDER — DEXAMETHASONE SODIUM PHOSPHATE 4 MG/ML
INJECTION, SOLUTION INTRA-ARTICULAR; INTRALESIONAL; INTRAMUSCULAR; INTRAVENOUS; SOFT TISSUE PRN
Status: DISCONTINUED | OUTPATIENT
Start: 2023-10-03 | End: 2023-10-03 | Stop reason: SDUPTHER

## 2023-10-03 RX ORDER — LIDOCAINE HYDROCHLORIDE AND EPINEPHRINE 10; 10 MG/ML; UG/ML
INJECTION, SOLUTION INFILTRATION; PERINEURAL
Status: COMPLETED | OUTPATIENT
Start: 2023-10-03 | End: 2023-10-03

## 2023-10-03 RX ADMIN — Medication 100 MCG: at 10:22

## 2023-10-03 RX ADMIN — PROPOFOL 200 MG: 10 INJECTION, EMULSION INTRAVENOUS at 10:18

## 2023-10-03 RX ADMIN — LIDOCAINE HYDROCHLORIDE 100 MG: 20 INJECTION, SOLUTION EPIDURAL; INFILTRATION; INTRACAUDAL; PERINEURAL at 10:18

## 2023-10-03 RX ADMIN — DEXAMETHASONE SODIUM PHOSPHATE 10 MG: 4 INJECTION, SOLUTION INTRAMUSCULAR; INTRAVENOUS at 10:46

## 2023-10-03 RX ADMIN — SODIUM CHLORIDE: 9 INJECTION, SOLUTION INTRAVENOUS at 10:13

## 2023-10-03 RX ADMIN — FENTANYL CITRATE 50 MCG: 50 INJECTION INTRAMUSCULAR; INTRAVENOUS at 10:51

## 2023-10-03 RX ADMIN — ROCURONIUM BROMIDE 50 MG: 10 INJECTION INTRAVENOUS at 10:18

## 2023-10-03 RX ADMIN — MIDAZOLAM 2 MG: 1 INJECTION INTRAMUSCULAR; INTRAVENOUS at 10:13

## 2023-10-03 RX ADMIN — FENTANYL CITRATE 50 MCG: 50 INJECTION INTRAMUSCULAR; INTRAVENOUS at 10:13

## 2023-10-03 RX ADMIN — SUGAMMADEX 200 MG: 100 INJECTION, SOLUTION INTRAVENOUS at 10:49

## 2023-10-03 RX ADMIN — Medication 200 MCG: at 10:35

## 2023-10-03 RX ADMIN — ONDANSETRON 4 MG: 2 INJECTION INTRAMUSCULAR; INTRAVENOUS at 10:46

## 2023-10-03 ASSESSMENT — PAIN SCALES - GENERAL
PAINLEVEL_OUTOF10: 0

## 2023-10-03 NOTE — DISCHARGE INSTRUCTIONS
Discharge Instructions for Functional Endoscopic Sinus Surgery    Functional endoscopic sinus surgery is a procedure to enlarge pathways to the sinuses. It is done to improve sinus drainage and may help to treat recurring sinus problems and infections. Recovery from this surgery takes about 1 weeks. Steps to [address]   While your sinuses are healing:   Do not blow your nose. You may have bloody discharge from your nose. Create a drip pad using rolls of gauze. Hold the roll under your nose to soak up any discharge. Avoid air pollutants like dust and smoke. It is often helpful to sleep with the head of bed elevated the first 2-3 nights  Physical Activity   During your recovery:   Light activity (no lifting more than 15 pounds) for 1 week. Plan on 1 week off of work. Medications   Dr. Jr Davalos   May give you an oral antibiotic or a steroid taper after surgery, take as directed  USE saline irrigations 4-5x per day starting the night of surgery    Follow these general medication guidelines:   Take your medication as directed. Do not change the amount or schedule. Do not share your medication with anyone. Medications can be dangerous when mixed. Talk to your doctor if you are taking more than one medication, including over-the-counter products and supplements. If you had to stop medications before surgery, ask your doctor when you can start again.     Follow-up  2 weeks  Call Your Doctor If Any of the Following Occur ([phone])  Contact your doctor if your recovery is not progressing as expected or you develop complications, such as:  Signs of infection, including fever and chills  Pain that you cannot control with the medications that you have been given  Redness, swelling, increasing pain, excessive bleeding, or discharge from the nose  Lightheadedness  Nausea and vomiting  Vomiting blood or material that looks like coffee grounds  Bruising around the eye(s)  Swelling of the eye(s)  Changes in

## 2023-10-03 NOTE — OP NOTE
[facility] SURGERY  OPERATIVE REPORT    Patient Name: Nani Nathan  YOB: 1965  Medical Record Number:  [id number]  Billing Number:  [id number]  Date of Procedure: [unfilled]  Time: 7765    Pre Operative Diagnoses: right intranasal mass  Left nasal polyp    Post Operative Diagnoses:    same             Procedure:  resection of right intranasal mass (18872)   Left nasal endoscopy with biopsy (84014)       Surgeon: Pete Hubbard MD    OR Staff/ Assistant:  Circulator: Diana Cheng RN  Surgical Assistant: Lora Munguia  Scrub Person First: Ilia Bowie  Circulator Assist: Aureliano Mccollum RN    Anesthesia:  General anesthesia. Findings:  1) benign appear mass in posterior left nasal cavity that appeared originate from the medial aspect of the middle turbinate  2. Benign appearing nasal polyp of the left anterior anterior middle turbinate    Indications: This is a 60 y.o. male with right-sided intranasal mass noted in clinic. He is here for removal and possible sinus surgery. Risks and benefits discussed with the patient including alternate treatment options, Informed consent was obtained, the patient elected to proceed with the planned procedure. DETAILS OF PROCEDURE(S):   The patient was brought to the operating room placed in supine position operating table. He underwent uncomplicated general anesthesia with endotracheal intubation. The head of bed was turned 180 degrees. He was then prepped and draped. Nasal endoscopy was performed. On the left side he had what appeared to be a benign polyp of the anterior aspect of the inferior turbinate. I biopsied this and sent it for permanent specimen. Otherwise in the left side did not appreciate any masses or lesions. On the right side he had a nasal mass that appeared to be benign medial to the middle turbinate. It seemed to be pedunculated onto the middle turbinate.   I grasped the

## 2023-10-03 NOTE — ANESTHESIA PRE PROCEDURE
Component Value Date/Time    WBC 7.4 06/15/2023 11:25 AM    RBC 5.53 06/15/2023 11:25 AM    HGB 16.4 06/15/2023 11:25 AM    HCT 48.3 06/15/2023 11:25 AM    MCV 87.3 06/15/2023 11:25 AM    RDW 14.1 06/15/2023 11:25 AM     06/15/2023 11:25 AM       CMP:   Lab Results   Component Value Date/Time     06/15/2023 11:25 AM    K 4.5 06/15/2023 11:25 AM    K 4.6 02/18/2023 10:17 AM     06/15/2023 11:25 AM    CO2 24 06/15/2023 11:25 AM    BUN 11 06/15/2023 11:25 AM    CREATININE 1.0 06/15/2023 11:25 AM    GFRAA >60 09/07/2022 01:48 PM    AGRATIO 1.4 02/18/2023 10:17 AM    LABGLOM >60 06/15/2023 11:25 AM    GLUCOSE 89 06/15/2023 11:25 AM    PROT 7.0 02/18/2023 10:17 AM    CALCIUM 9.4 06/15/2023 11:25 AM    BILITOT 0.3 02/18/2023 10:17 AM    ALKPHOS 73 02/18/2023 10:17 AM    AST 19 02/18/2023 10:17 AM    ALT 13 02/18/2023 10:17 AM       POC Tests: No results for input(s): \"POCGLU\", \"POCNA\", \"POCK\", \"POCCL\", \"POCBUN\", \"POCHEMO\", \"POCHCT\" in the last 72 hours.     Coags:   Lab Results   Component Value Date/Time    PROTIME 12.3 02/18/2023 10:17 AM    INR 1.10 04/04/2023 08:28 AM       HCG (If Applicable): No results found for: \"PREGTESTUR\", \"PREGSERUM\", \"HCG\", \"HCGQUANT\"     ABGs: No results found for: \"PHART\", \"PO2ART\", \"QCA9FTT\", \"FTA2WEY\", \"BEART\", \"M2WMLPUS\"     Type & Screen (If Applicable):  No results found for: \"LABABO\", \"LABRH\"    Drug/Infectious Status (If Applicable):  No results found for: \"HIV\", \"HEPCAB\"    COVID-19 Screening (If Applicable):   Lab Results   Component Value Date/Time    COVID19 Not Detected 12/28/2022 03:06 PM           Anesthesia Evaluation  Patient summary reviewed and Nursing notes reviewed no history of anesthetic complications:   Airway: Mallampati: II  TM distance: >3 FB   Neck ROM: full  Mouth opening: > = 3 FB   Dental:      Comment: Some missing teeth    Pulmonary:Negative Pulmonary ROS and normal exam                               Cardiovascular:  Exercise tolerance:

## 2023-10-03 NOTE — ANESTHESIA POSTPROCEDURE EVALUATION
Department of Anesthesiology  Postprocedure Note    Patient: Lou Estrada  MRN: [id number]  YOB: 1965  Date of evaluation: 10/3/2023      Procedure Summary     Date: 10/03/23 Room / Location: [address]    Anesthesia Start: 1013 Anesthesia Stop: 1104    Procedure: resection of right intranasal mass Left nasal endoscopy with biopsy  (Right: Nose) Diagnosis:       Nasal polyp      Chronic sinusitis, unspecified location      Nasal congestion      (Nasal polyp [J33.9])      (Chronic sinusitis, unspecified location [J32.9])      (Nasal congestion [R09.81])    Surgeons: Milana Velarde MD Responsible Provider: Adalid Culver MD    Anesthesia Type: general ASA Status: 3          Anesthesia Type: No value filed.     Eladio Phase I: Eladio Score: 10    Eladio Phase II: Eladio Score: 10      Anesthesia Post Evaluation    Patient location during evaluation: PACU  Patient participation: complete - patient participated  Level of consciousness: awake and alert  Airway patency: patent  Nausea & Vomiting: no nausea and no vomiting  Complications: no  Cardiovascular status: hemodynamically stable  Respiratory status: acceptable  Hydration status: stable  Pain management: adequate

## 2023-10-03 NOTE — H&P
I have reviewed this patient's history and physical.  There have been no significant changes. The patient understands the risk of right-sided endoscopic sinus surgery to remove the nasal polyp including recurrence, need for an additional procedures based on pathology, bleeding, CSF leak and damage to the eye. He has agreed to proceed.

## 2023-10-03 NOTE — PROGRESS NOTES
Pt sitting up in bed, resting comfortably, states pain 0/10 at this time, is ready to move to phase 2. Vss. No signs of distress noted at this time. 93

## 2023-10-03 NOTE — PROGRESS NOTES
Pt states ready to go home. Pt discharged in stable condition. No c/o including pain , nausea or any bleeding fm nose. Dressings and tape given to pt and wife with instructions - verbalized understanding. Pt had coffee and cookies. Pt has ice pack for nose.

## 2023-10-03 NOTE — PROGRESS NOTES
Pt to pacu from OR. Pt asleep but arousable to voice, on 3L via NC per CRNA, placed on monitor, VSS. Pt denies pain or nausea at this time, oriented to pacu. Nose intact, no drainage noted at this time, report obtained. No signs of distress noted at this time.

## 2023-10-19 ENCOUNTER — OFFICE VISIT (OUTPATIENT)
Dept: ENT CLINIC | Age: 58
End: 2023-10-19
Payer: COMMERCIAL

## 2023-10-19 VITALS
SYSTOLIC BLOOD PRESSURE: 134 MMHG | TEMPERATURE: 97.2 F | OXYGEN SATURATION: 97 % | HEART RATE: 88 BPM | BODY MASS INDEX: 33.72 KG/M2 | RESPIRATION RATE: 16 BRPM | WEIGHT: 249 LBS | HEIGHT: 72 IN | DIASTOLIC BLOOD PRESSURE: 90 MMHG

## 2023-10-19 DIAGNOSIS — J33.9 NASAL POLYP: Primary | ICD-10-CM

## 2023-10-19 PROCEDURE — 3017F COLORECTAL CA SCREEN DOC REV: CPT | Performed by: OTOLARYNGOLOGY

## 2023-10-19 PROCEDURE — 3075F SYST BP GE 130 - 139MM HG: CPT | Performed by: OTOLARYNGOLOGY

## 2023-10-19 PROCEDURE — 3080F DIAST BP >= 90 MM HG: CPT | Performed by: OTOLARYNGOLOGY

## 2023-10-19 PROCEDURE — G8484 FLU IMMUNIZE NO ADMIN: HCPCS | Performed by: OTOLARYNGOLOGY

## 2023-10-19 PROCEDURE — 99213 OFFICE O/P EST LOW 20 MIN: CPT | Performed by: OTOLARYNGOLOGY

## 2023-10-19 PROCEDURE — 31231 NASAL ENDOSCOPY DX: CPT | Performed by: OTOLARYNGOLOGY

## 2023-10-19 PROCEDURE — G8417 CALC BMI ABV UP PARAM F/U: HCPCS | Performed by: OTOLARYNGOLOGY

## 2023-10-19 PROCEDURE — G8427 DOCREV CUR MEDS BY ELIG CLIN: HCPCS | Performed by: OTOLARYNGOLOGY

## 2023-10-19 PROCEDURE — 1036F TOBACCO NON-USER: CPT | Performed by: OTOLARYNGOLOGY

## 2023-10-19 NOTE — PROGRESS NOTES
[address] [facility]GERY  Follow up      Patient Name: Jade Garcia  Medical Record Number:  [id number]  Primary Care Physician:  SHERI Love CNP  Date of Consultation: 10/19/2023    Chief Complaint: Nasal issues        Interval History    Patient is following up for his removal of nasal polyps. I saw him on October 30, 2023. He had a large nasal polyp on the right side and I removed his middle turbinate. On the left side he had a small nasal polyp. He says he has been doing okay. He thinks he is breathing better out of the right side. REVIEW OF SYSTEMS  As above    PHYSICAL EXAM  GENERAL: No Acute Distress, Alert and Oriented, no Hoarseness, strong voice  EYES: EOMI, Anti-icteric  HENT:   Head: Normocephalic and atraumatic. Face:  Symmetric, facial nerve intact, no sinus tenderness  Right Ear: Normal external ear, normal external auditory canal, intact tympanic membrane with normal mobility and aerated middle ear  Left Ear: Normal external ear, normal external auditory canal, intact tympanic membrane with normal mobility and aerated middle ear  Mouth/Oral Cavity:  normal lips, Uvula is midline, no mucosal lesions, no trismus  Oropharynx/Larynx:  normal oropharynx  Nose:Normal external nasal appearance. See below  NECK: Normal range of motion, no thyromegaly, trachea is midline, no lymphadenopathy, no neck masses, no crepitus          RA  REVIEW OF MEDICAL RECORDS  The nasal polyp was just an inflammatory polyp on both sides. PROCEDURE  Nasal endoscopy  Afrin and lidocaine were applied the bilateral cavity. Rigid scope used to visualize left nasal cavity. No evidence of regrowth of the polyp. On the right side he has a little bit of crusting where the middle turbinate was. No evidence of regrowth of polyps. ASSESSMENT/PLAN  1. Nasal polyp  The polyps were just benign. I would like for him to start Flonase at this time.   I

## 2024-04-12 ENCOUNTER — PREP FOR PROCEDURE (OUTPATIENT)
Dept: ORTHOPEDIC SURGERY | Age: 59
End: 2024-04-12

## 2024-04-12 ENCOUNTER — TELEPHONE (OUTPATIENT)
Dept: CARDIOLOGY CLINIC | Age: 59
End: 2024-04-12

## 2024-04-12 DIAGNOSIS — M17.11 PRIMARY OSTEOARTHRITIS OF RIGHT KNEE: Primary | ICD-10-CM

## 2024-04-12 DIAGNOSIS — M17.12 PRIMARY OSTEOARTHRITIS OF LEFT KNEE: ICD-10-CM

## 2024-04-12 RX ORDER — SODIUM CHLORIDE 9 MG/ML
INJECTION, SOLUTION INTRAVENOUS CONTINUOUS
Status: CANCELLED | OUTPATIENT
Start: 2024-05-01

## 2024-04-12 RX ORDER — ACETAMINOPHEN 325 MG/1
1000 TABLET ORAL ONCE
Status: CANCELLED | OUTPATIENT
Start: 2024-05-01 | End: 2024-04-12

## 2024-04-12 RX ORDER — DEXAMETHASONE SODIUM PHOSPHATE 10 MG/ML
8 INJECTION, SOLUTION INTRAMUSCULAR; INTRAVENOUS ONCE
Status: CANCELLED | OUTPATIENT
Start: 2024-05-01 | End: 2024-04-12

## 2024-04-12 RX ORDER — CELECOXIB 200 MG/1
200 CAPSULE ORAL ONCE
Status: CANCELLED | OUTPATIENT
Start: 2024-05-01 | End: 2024-04-12

## 2024-04-12 RX ORDER — SODIUM CHLORIDE 0.9 % (FLUSH) 0.9 %
5-40 SYRINGE (ML) INJECTION EVERY 12 HOURS SCHEDULED
Status: CANCELLED | OUTPATIENT
Start: 2024-05-01

## 2024-04-12 RX ORDER — SODIUM CHLORIDE 9 MG/ML
INJECTION, SOLUTION INTRAVENOUS PRN
Status: CANCELLED | OUTPATIENT
Start: 2024-05-01

## 2024-04-12 RX ORDER — SODIUM CHLORIDE 0.9 % (FLUSH) 0.9 %
5-40 SYRINGE (ML) INJECTION PRN
Status: CANCELLED | OUTPATIENT
Start: 2024-05-01

## 2024-04-12 NOTE — TELEPHONE ENCOUNTER
Please advise cardiac clearance for  Total knee replacement on the right leg   Date 5/1/2024  Not requesting to hold any medications.     History of afib, cerebral vascular disease, and hypertension   On ASA 81

## 2024-04-12 NOTE — TELEPHONE ENCOUNTER
CARDIAC CLEARANCE REQUEST    What type of procedure are you having:  Total knee replacement on the right leg     Are you taking any blood thinners:  baby aspirin -   no need to stop     When is your procedure scheduled for: 5/1/2024     What physician is performing your procedure: Dr. Antonio  -  Phone Number: [phone]     Fax number to send the letter:   [phone]

## 2024-04-16 PROBLEM — M17.12 PRIMARY OSTEOARTHRITIS OF LEFT KNEE: Status: ACTIVE | Noted: 2024-04-12

## 2024-04-17 ENCOUNTER — HOSPITAL ENCOUNTER (OUTPATIENT)
Age: 59
Discharge: HOME | End: 2024-04-17
Payer: COMMERCIAL

## 2024-04-17 DIAGNOSIS — Z12.5 SCREENING FOR PROSTATE CANCER: ICD-10-CM

## 2024-04-17 DIAGNOSIS — E78.2 MIXED HYPERLIPIDEMIA: ICD-10-CM

## 2024-04-17 DIAGNOSIS — M17.11 PRIMARY OSTEOARTHRITIS OF RIGHT KNEE: ICD-10-CM

## 2024-04-17 PROBLEM — R20.2 PARESTHESIAS: Status: RESOLVED | Noted: 2023-02-18 | Resolved: 2024-04-17

## 2024-04-17 PROBLEM — I48.0 PAF (PAROXYSMAL ATRIAL FIBRILLATION) (HCC): Status: RESOLVED | Noted: 2023-02-18 | Resolved: 2024-04-17

## 2024-04-17 PROBLEM — I48.19 PERSISTENT ATRIAL FIBRILLATION (HCC): Status: RESOLVED | Noted: 2023-09-25 | Resolved: 2024-04-17

## 2024-04-17 PROBLEM — I48.92 LEFT ATRIAL FLUTTER BY ELECTROCARDIOGRAM (HCC): Status: RESOLVED | Noted: 2023-07-05 | Resolved: 2024-04-17

## 2024-04-17 PROBLEM — R20.2 PARESTHESIA: Status: RESOLVED | Noted: 2023-02-18 | Resolved: 2024-04-17

## 2024-04-17 LAB
ALBUMIN SERPL-MCNC: 4.3 G/DL (ref 3.4–5)
ALBUMIN/GLOB SERPL: 1.4 {RATIO} (ref 1.1–2.2)
ALP SERPL-CCNC: 88 U/L (ref 40–129)
ALT SERPL-CCNC: 18 U/L (ref 10–40)
ANION GAP SERPL CALCULATED.3IONS-SCNC: 11 MMOL/L (ref 3–16)
APTT BLD: 30.6 SEC (ref 22.1–36.4)
AST SERPL-CCNC: 18 U/L (ref 15–37)
BILIRUB SERPL-MCNC: 0.5 MG/DL (ref 0–1)
BILIRUB UR QL STRIP.AUTO: NEGATIVE
BUN SERPL-MCNC: 9 MG/DL (ref 7–20)
CALCIUM SERPL-MCNC: 9 MG/DL (ref 8.3–10.6)
CHLORIDE SERPL-SCNC: 103 MMOL/L (ref 99–110)
CHOLEST SERPL-MCNC: 164 MG/DL (ref 0–199)
CLARITY UR: CLEAR
CO2 SERPL-SCNC: 24 MMOL/L (ref 21–32)
COLOR UR: YELLOW
CREAT SERPL-MCNC: 0.8 MG/DL (ref 0.9–1.3)
DEPRECATED RDW RBC AUTO: 13.5 % (ref 12.4–15.4)
EST. AVERAGE GLUCOSE BLD GHB EST-MCNC: 111.2 MG/DL
GFR SERPLBLD CREATININE-BSD FMLA CKD-EPI: >90 ML/MIN/{1.73_M2}
GLUCOSE SERPL-MCNC: 97 MG/DL (ref 70–99)
GLUCOSE UR STRIP.AUTO-MCNC: NEGATIVE MG/DL
HBA1C MFR BLD: 5.5 %
HCT VFR BLD AUTO: 44.4 % (ref 40.5–52.5)
HDLC SERPL-MCNC: 40 MG/DL (ref 40–60)
HGB BLD-MCNC: 15.2 G/DL (ref 13.5–17.5)
HGB UR QL STRIP.AUTO: NEGATIVE
INR PPP: 0.95 (ref 0.85–1.15)
KETONES UR STRIP.AUTO-MCNC: NEGATIVE MG/DL
LDLC SERPL CALC-MCNC: 78 MG/DL
LEUKOCYTE ESTERASE UR QL STRIP.AUTO: NEGATIVE
MCH RBC QN AUTO: 29.2 PG (ref 26–34)
MCHC RBC AUTO-ENTMCNC: 34.3 G/DL (ref 31–36)
MCV RBC AUTO: 85 FL (ref 80–100)
NITRITE UR QL STRIP.AUTO: NEGATIVE
PH UR STRIP.AUTO: 5.5 [PH] (ref 5–8)
PLATELET # BLD AUTO: 247 K/UL (ref 135–450)
PMV BLD AUTO: 7.9 FL (ref 5–10.5)
POTASSIUM SERPL-SCNC: 4.1 MMOL/L (ref 3.5–5.1)
PROT SERPL-MCNC: 7.3 G/DL (ref 6.4–8.2)
PROT UR STRIP.AUTO-MCNC: NEGATIVE MG/DL
PROTHROMBIN TIME: 12.8 SEC (ref 11.9–14.9)
PSA SERPL DL<=0.01 NG/ML-MCNC: 1.1 NG/ML (ref 0–4)
RBC # BLD AUTO: 5.22 M/UL (ref 4.2–5.9)
SODIUM SERPL-SCNC: 138 MMOL/L (ref 136–145)
SP GR UR STRIP.AUTO: 1.01 (ref 1–1.03)
TRIGL SERPL-MCNC: 229 MG/DL (ref 0–150)
UA DIPSTICK W REFLEX MICRO PNL UR: NORMAL
URN SPEC COLLECT METH UR: NORMAL
UROBILINOGEN UR STRIP-ACNC: 0.2 E.U./DL
VLDLC SERPL CALC-MCNC: 46 MG/DL
WBC # BLD AUTO: 5.8 K/UL (ref 4–11)

## 2024-04-17 PROCEDURE — 83036 HEMOGLOBIN GLYCOSYLATED A1C: CPT

## 2024-04-17 PROCEDURE — 82306 VITAMIN D 25 HYDROXY: CPT

## 2024-04-17 PROCEDURE — 84153 ASSAY OF PSA TOTAL: CPT

## 2024-04-17 PROCEDURE — 36415 COLL VENOUS BLD VENIPUNCTURE: CPT

## 2024-04-17 PROCEDURE — 85027 COMPLETE CBC AUTOMATED: CPT

## 2024-04-17 PROCEDURE — 85730 THROMBOPLASTIN TIME PARTIAL: CPT

## 2024-04-17 PROCEDURE — 80061 LIPID PANEL: CPT

## 2024-04-17 PROCEDURE — 85610 PROTHROMBIN TIME: CPT

## 2024-04-17 PROCEDURE — 80053 COMPREHEN METABOLIC PANEL: CPT

## 2024-04-17 PROCEDURE — 81003 URINALYSIS AUTO W/O SCOPE: CPT

## 2024-04-17 NOTE — H&P (VIEW-ONLY)
tympanic membrane, ear canal and external ear normal.      Left Ear: Hearing, tympanic membrane, ear canal and external ear normal.      Nose: Nose normal.      Mouth/Throat:      Lips: Pink.      Mouth: Mucous membranes are moist.      Pharynx: Oropharynx is clear. Uvula midline.   Eyes:      Conjunctiva/sclera: Conjunctivae normal.      Pupils: Pupils are equal, round, and reactive to light.   Neck:      Vascular: No carotid bruit.   Cardiovascular:      Rate and Rhythm: Normal rate and regular rhythm.      Pulses: Normal pulses.      Heart sounds: Normal heart sounds. No murmur heard.     No friction rub. No gallop.   Pulmonary:      Effort: Pulmonary effort is normal. No accessory muscle usage or respiratory distress.      Breath sounds: Normal breath sounds and air entry. No decreased air movement or transmitted upper airway sounds. No decreased breath sounds, wheezing, rhonchi or rales.   Chest:      Chest wall: No tenderness.   Abdominal:      General: Bowel sounds are normal. There is no distension.      Palpations: Abdomen is soft. There is no mass.      Tenderness: There is no abdominal tenderness. There is no guarding or rebound.   Musculoskeletal:         General: No swelling. Normal range of motion.      Cervical back: Normal range of motion and neck supple.   Lymphadenopathy:      Cervical: No cervical adenopathy.   Skin:     General: Skin is warm and dry.      Capillary Refill: Capillary refill takes less than 2 seconds.   Neurological:      Mental Status: He is alert and oriented to person, place, and time.   Psychiatric:         Attention and Perception: Attention and perception normal.         Mood and Affect: Mood and affect normal.         Speech: Speech normal.         Behavior: Behavior normal. Behavior is cooperative.         Thought Content: Thought content normal.         Cognition and Memory: Cognition and memory normal.         Judgment: Judgment normal.           EKG Interpretation:

## 2024-04-18 LAB — 25(OH)D3 SERPL-MCNC: 19.7 NG/ML

## 2024-04-23 ENCOUNTER — TELEPHONE (OUTPATIENT)
Dept: ORTHOPEDIC SURGERY | Age: 59
End: 2024-04-23

## 2024-04-23 NOTE — TELEPHONE ENCOUNTER
Orthopedic Nurse Navigator Summary    Patient Name:  Brigido Cottrell  Anticipated Date of Surgery:  05/01/24  Attended Pre-op Education Class:    PCP: Raúl Ortez CNP  Date of PCP visit for H&P: 04/17/24  Is patient in a Pain Management program:  Review of Medical history reveals history of: HTN, Hyperlipidemia, CVA, Atrial fib, S/P ablation 2023, Anxiety, Depression    Critical Lab Values  - Hemoglobin (g/dL):  Date: 04/17/24 Value 15.2  - Hematocrit(%): Date: 04/17/24  Value 44.4  - HgbA1C:  Date: 04/17/24 Value 5.5  - Albumin:  Date: 04/17/24  Value 4.3  - BUN:  Date: 04/17/24  Value 9  - Creatinine:  Date: 04/17/24  Value 0.8    Coronary Artery Disease/HTN/CHF history: Yes  Does the patient see a Cardiologist: Silvino Castro MD  Date of most recent cardiac appt: 04/15/24  On any anticoagulation:  Aspirin 81 mg QD    Diabetes History:  No  Most recent HgbA1C: 5.5  Pulmonary:  COPD/Emphysema/Use of home oxygen: No  Alcohol use: Yes    BMI greater than 40 at time of scheduling:  No  Additional medical concerns:  Additional recommendations for above concerns:  Attended Pre-Hab program:    Anticipated Discharge Disposition:  Home with OPT  Who will be with patient at home following discharge:  domestic partner  Equipment patient already has:    Bedroom on first or second floor:    Bathroom on first or second floor:    Weight bearing status:  wbat  Pre-op ambulatory status: painful ambulation  Number of entry steps:    Caregiver assistance:  full time    Kelly Craig RN  Date:   04/23/24

## 2024-04-25 RX ORDER — ALBUTEROL SULFATE 90 UG/1
2 AEROSOL, METERED RESPIRATORY (INHALATION) EVERY 4 HOURS PRN
COMMUNITY
Start: 2024-04-19

## 2024-05-01 ENCOUNTER — ANESTHESIA (OUTPATIENT)
Dept: OPERATING ROOM | Age: 59
End: 2024-05-01
Payer: COMMERCIAL

## 2024-05-01 ENCOUNTER — APPOINTMENT (OUTPATIENT)
Dept: GENERAL RADIOLOGY | Age: 59
End: 2024-05-01
Attending: ORTHOPAEDIC SURGERY
Payer: COMMERCIAL

## 2024-05-01 ENCOUNTER — ANESTHESIA EVENT (OUTPATIENT)
Dept: OPERATING ROOM | Age: 59
End: 2024-05-01
Payer: COMMERCIAL

## 2024-05-01 ENCOUNTER — HOSPITAL ENCOUNTER (OUTPATIENT)
Age: 59
Setting detail: SURGERY ADMIT
Discharge: HOME | End: 2024-05-01
Attending: ORTHOPAEDIC SURGERY | Admitting: ORTHOPAEDIC SURGERY
Payer: COMMERCIAL

## 2024-05-01 VITALS
TEMPERATURE: 97.8 F | HEART RATE: 70 BPM | OXYGEN SATURATION: 96 % | BODY MASS INDEX: 32.86 KG/M2 | RESPIRATION RATE: 16 BRPM | SYSTOLIC BLOOD PRESSURE: 110 MMHG | WEIGHT: 242.6 LBS | DIASTOLIC BLOOD PRESSURE: 70 MMHG | HEIGHT: 72 IN

## 2024-05-01 DIAGNOSIS — M17.12 PRIMARY OSTEOARTHRITIS OF LEFT KNEE: Primary | ICD-10-CM

## 2024-05-01 PROCEDURE — 7100000001 HC PACU RECOVERY - ADDTL 15 MIN: Performed by: ORTHOPAEDIC SURGERY

## 2024-05-01 PROCEDURE — 3700000000 HC ANESTHESIA ATTENDED CARE: Performed by: ORTHOPAEDIC SURGERY

## 2024-05-01 PROCEDURE — 97530 THERAPEUTIC ACTIVITIES: CPT

## 2024-05-01 PROCEDURE — 2720000010 HC SURG SUPPLY STERILE: Performed by: ORTHOPAEDIC SURGERY

## 2024-05-01 PROCEDURE — 3600000004 HC SURGERY LEVEL 4 BASE: Performed by: ORTHOPAEDIC SURGERY

## 2024-05-01 PROCEDURE — 2709999900 HC NON-CHARGEABLE SUPPLY: Performed by: ORTHOPAEDIC SURGERY

## 2024-05-01 PROCEDURE — 7100000000 HC PACU RECOVERY - FIRST 15 MIN: Performed by: ORTHOPAEDIC SURGERY

## 2024-05-01 PROCEDURE — 2580000003 HC RX 258: Performed by: ORTHOPAEDIC SURGERY

## 2024-05-01 PROCEDURE — A4217 STERILE WATER/SALINE, 500 ML: HCPCS | Performed by: ORTHOPAEDIC SURGERY

## 2024-05-01 PROCEDURE — 6360000002 HC RX W HCPCS

## 2024-05-01 PROCEDURE — 3600000014 HC SURGERY LEVEL 4 ADDTL 15MIN: Performed by: ORTHOPAEDIC SURGERY

## 2024-05-01 PROCEDURE — 73560 X-RAY EXAM OF KNEE 1 OR 2: CPT

## 2024-05-01 PROCEDURE — 6370000000 HC RX 637 (ALT 250 FOR IP): Performed by: ORTHOPAEDIC SURGERY

## 2024-05-01 PROCEDURE — 6360000002 HC RX W HCPCS: Performed by: ORTHOPAEDIC SURGERY

## 2024-05-01 PROCEDURE — 2500000003 HC RX 250 WO HCPCS: Performed by: ORTHOPAEDIC SURGERY

## 2024-05-01 PROCEDURE — 6360000002 HC RX W HCPCS: Performed by: ANESTHESIOLOGY

## 2024-05-01 PROCEDURE — 97535 SELF CARE MNGMENT TRAINING: CPT

## 2024-05-01 PROCEDURE — 2580000003 HC RX 258: Performed by: ANESTHESIOLOGY

## 2024-05-01 PROCEDURE — 97166 OT EVAL MOD COMPLEX 45 MIN: CPT

## 2024-05-01 PROCEDURE — 3700000001 HC ADD 15 MINUTES (ANESTHESIA): Performed by: ORTHOPAEDIC SURGERY

## 2024-05-01 PROCEDURE — C1713 ANCHOR/SCREW BN/BN,TIS/BN: HCPCS | Performed by: ORTHOPAEDIC SURGERY

## 2024-05-01 PROCEDURE — 7100000011 HC PHASE II RECOVERY - ADDTL 15 MIN: Performed by: ORTHOPAEDIC SURGERY

## 2024-05-01 PROCEDURE — 97116 GAIT TRAINING THERAPY: CPT

## 2024-05-01 PROCEDURE — 97162 PT EVAL MOD COMPLEX 30 MIN: CPT

## 2024-05-01 PROCEDURE — 7100000010 HC PHASE II RECOVERY - FIRST 15 MIN: Performed by: ORTHOPAEDIC SURGERY

## 2024-05-01 PROCEDURE — C1776 JOINT DEVICE (IMPLANTABLE): HCPCS | Performed by: ORTHOPAEDIC SURGERY

## 2024-05-01 PROCEDURE — 64447 NJX AA&/STRD FEMORAL NRV IMG: CPT | Performed by: ANESTHESIOLOGY

## 2024-05-01 DEVICE — PSN TIB STM 5 DEG SZ E R: Type: IMPLANTABLE DEVICE | Site: KNEE | Status: FUNCTIONAL

## 2024-05-01 DEVICE — KNEE K1 TOT HEMI STD CEM IMPL CAPPED K1 ZIM: Type: IMPLANTABLE DEVICE | Site: KNEE | Status: FUNCTIONAL

## 2024-05-01 DEVICE — COMPONENT PAT DIA35MM THK9MM STD KNEE VIVACIT-E CEM PERSONA: Type: IMPLANTABLE DEVICE | Site: KNEE | Status: FUNCTIONAL

## 2024-05-01 DEVICE — CEMENT BNE 20ML 40GM FULL DOSE PMMA W/O ANTIBIO M VISC: Type: IMPLANTABLE DEVICE | Site: KNEE | Status: FUNCTIONAL

## 2024-05-01 DEVICE — PSN FEM CR CMT CCR NRW SZ10 R: Type: IMPLANTABLE DEVICE | Site: KNEE | Status: FUNCTIONAL

## 2024-05-01 DEVICE — IMPLANTABLE DEVICE: Type: IMPLANTABLE DEVICE | Site: KNEE | Status: FUNCTIONAL

## 2024-05-01 RX ORDER — SODIUM CHLORIDE 0.9 % (FLUSH) 0.9 %
5-40 SYRINGE (ML) INJECTION PRN
Status: DISCONTINUED | OUTPATIENT
Start: 2024-05-01 | End: 2024-05-01 | Stop reason: HOSPADM

## 2024-05-01 RX ORDER — HYDROMORPHONE HYDROCHLORIDE 1 MG/ML
0.5 INJECTION, SOLUTION INTRAMUSCULAR; INTRAVENOUS; SUBCUTANEOUS EVERY 5 MIN PRN
Status: DISCONTINUED | OUTPATIENT
Start: 2024-05-01 | End: 2024-05-01 | Stop reason: HOSPADM

## 2024-05-01 RX ORDER — FENTANYL CITRATE 50 UG/ML
25 INJECTION, SOLUTION INTRAMUSCULAR; INTRAVENOUS EVERY 5 MIN PRN
Status: DISCONTINUED | OUTPATIENT
Start: 2024-05-01 | End: 2024-05-01 | Stop reason: HOSPADM

## 2024-05-01 RX ORDER — OXYCODONE HYDROCHLORIDE 5 MG/1
5 TABLET ORAL
Status: COMPLETED | OUTPATIENT
Start: 2024-05-01 | End: 2024-05-01

## 2024-05-01 RX ORDER — SODIUM CHLORIDE 0.9 % (FLUSH) 0.9 %
5-40 SYRINGE (ML) INJECTION EVERY 12 HOURS SCHEDULED
Status: DISCONTINUED | OUTPATIENT
Start: 2024-05-01 | End: 2024-05-01 | Stop reason: HOSPADM

## 2024-05-01 RX ORDER — CEFADROXIL 500 MG/1
500 CAPSULE ORAL 2 TIMES DAILY
Qty: 14 CAPSULE | Refills: 0 | Status: SHIPPED | OUTPATIENT
Start: 2024-05-01 | End: 2024-05-08

## 2024-05-01 RX ORDER — CELECOXIB 200 MG/1
200 CAPSULE ORAL ONCE
Status: COMPLETED | OUTPATIENT
Start: 2024-05-01 | End: 2024-05-01

## 2024-05-01 RX ORDER — DEXAMETHASONE SODIUM PHOSPHATE 10 MG/ML
8 INJECTION, SOLUTION INTRAMUSCULAR; INTRAVENOUS ONCE
Status: COMPLETED | OUTPATIENT
Start: 2024-05-01 | End: 2024-05-01

## 2024-05-01 RX ORDER — IPRATROPIUM BROMIDE AND ALBUTEROL SULFATE 2.5; .5 MG/3ML; MG/3ML
1 SOLUTION RESPIRATORY (INHALATION)
Status: DISCONTINUED | OUTPATIENT
Start: 2024-05-01 | End: 2024-05-01 | Stop reason: HOSPADM

## 2024-05-01 RX ORDER — SODIUM CHLORIDE 9 MG/ML
INJECTION, SOLUTION INTRAVENOUS PRN
Status: DISCONTINUED | OUTPATIENT
Start: 2024-05-01 | End: 2024-05-01 | Stop reason: HOSPADM

## 2024-05-01 RX ORDER — OXYCODONE HYDROCHLORIDE 5 MG/1
5 TABLET ORAL EVERY 6 HOURS PRN
Qty: 28 TABLET | Refills: 0 | Status: SHIPPED | OUTPATIENT
Start: 2024-05-01 | End: 2024-05-08

## 2024-05-01 RX ORDER — ACETAMINOPHEN 325 MG/1
650 TABLET ORAL
Status: DISCONTINUED | OUTPATIENT
Start: 2024-05-01 | End: 2024-05-01 | Stop reason: HOSPADM

## 2024-05-01 RX ORDER — PROPOFOL 10 MG/ML
INJECTION, EMULSION INTRAVENOUS CONTINUOUS PRN
Status: DISCONTINUED | OUTPATIENT
Start: 2024-05-01 | End: 2024-05-01 | Stop reason: SDUPTHER

## 2024-05-01 RX ORDER — BUPIVACAINE HYDROCHLORIDE 7.5 MG/ML
INJECTION, SOLUTION INTRASPINAL
Status: COMPLETED | OUTPATIENT
Start: 2024-05-01 | End: 2024-05-01

## 2024-05-01 RX ORDER — FENTANYL CITRATE 50 UG/ML
INJECTION, SOLUTION INTRAMUSCULAR; INTRAVENOUS
Status: COMPLETED | OUTPATIENT
Start: 2024-05-01 | End: 2024-05-01

## 2024-05-01 RX ORDER — MIDAZOLAM HYDROCHLORIDE 1 MG/ML
INJECTION INTRAMUSCULAR; INTRAVENOUS
Status: COMPLETED
Start: 2024-05-01 | End: 2024-05-01

## 2024-05-01 RX ORDER — FENTANYL CITRATE 50 UG/ML
INJECTION, SOLUTION INTRAMUSCULAR; INTRAVENOUS
Status: COMPLETED
Start: 2024-05-01 | End: 2024-05-01

## 2024-05-01 RX ORDER — LIDOCAINE HYDROCHLORIDE 10 MG/ML
1 INJECTION, SOLUTION EPIDURAL; INFILTRATION; INTRACAUDAL; PERINEURAL
Status: DISCONTINUED | OUTPATIENT
Start: 2024-05-01 | End: 2024-05-01 | Stop reason: HOSPADM

## 2024-05-01 RX ORDER — FENTANYL CITRATE 50 UG/ML
INJECTION, SOLUTION INTRAMUSCULAR; INTRAVENOUS PRN
Status: DISCONTINUED | OUTPATIENT
Start: 2024-05-01 | End: 2024-05-01 | Stop reason: SDUPTHER

## 2024-05-01 RX ORDER — DEXAMETHASONE SODIUM PHOSPHATE 4 MG/ML
4 INJECTION, SOLUTION INTRA-ARTICULAR; INTRALESIONAL; INTRAMUSCULAR; INTRAVENOUS; SOFT TISSUE EVERY 12 HOURS
Status: DISCONTINUED | OUTPATIENT
Start: 2024-05-01 | End: 2024-05-01 | Stop reason: HOSPADM

## 2024-05-01 RX ORDER — VANCOMYCIN HYDROCHLORIDE 1 G/20ML
INJECTION, POWDER, LYOPHILIZED, FOR SOLUTION INTRAVENOUS PRN
Status: DISCONTINUED | OUTPATIENT
Start: 2024-05-01 | End: 2024-05-01 | Stop reason: HOSPADM

## 2024-05-01 RX ORDER — ACETAMINOPHEN 325 MG/1
1000 TABLET ORAL ONCE
Status: DISCONTINUED | OUTPATIENT
Start: 2024-05-01 | End: 2024-05-01 | Stop reason: HOSPADM

## 2024-05-01 RX ORDER — ROPIVACAINE HYDROCHLORIDE 5 MG/ML
INJECTION, SOLUTION EPIDURAL; INFILTRATION; PERINEURAL
Status: COMPLETED
Start: 2024-05-01 | End: 2024-05-01

## 2024-05-01 RX ORDER — SODIUM CHLORIDE 9 MG/ML
INJECTION, SOLUTION INTRAVENOUS CONTINUOUS
Status: DISCONTINUED | OUTPATIENT
Start: 2024-05-01 | End: 2024-05-01 | Stop reason: HOSPADM

## 2024-05-01 RX ORDER — LABETALOL HYDROCHLORIDE 5 MG/ML
10 INJECTION, SOLUTION INTRAVENOUS
Status: DISCONTINUED | OUTPATIENT
Start: 2024-05-01 | End: 2024-05-01 | Stop reason: HOSPADM

## 2024-05-01 RX ORDER — NALOXONE HYDROCHLORIDE 0.4 MG/ML
INJECTION, SOLUTION INTRAMUSCULAR; INTRAVENOUS; SUBCUTANEOUS PRN
Status: DISCONTINUED | OUTPATIENT
Start: 2024-05-01 | End: 2024-05-01 | Stop reason: HOSPADM

## 2024-05-01 RX ORDER — ROPIVACAINE HYDROCHLORIDE 5 MG/ML
INJECTION, SOLUTION EPIDURAL; INFILTRATION; PERINEURAL
Status: COMPLETED | OUTPATIENT
Start: 2024-05-01 | End: 2024-05-01

## 2024-05-01 RX ORDER — PROCHLORPERAZINE EDISYLATE 5 MG/ML
5 INJECTION INTRAMUSCULAR; INTRAVENOUS
Status: DISCONTINUED | OUTPATIENT
Start: 2024-05-01 | End: 2024-05-01 | Stop reason: HOSPADM

## 2024-05-01 RX ORDER — SODIUM CHLORIDE, SODIUM LACTATE, POTASSIUM CHLORIDE, CALCIUM CHLORIDE 600; 310; 30; 20 MG/100ML; MG/100ML; MG/100ML; MG/100ML
INJECTION, SOLUTION INTRAVENOUS CONTINUOUS
Status: DISCONTINUED | OUTPATIENT
Start: 2024-05-01 | End: 2024-05-01 | Stop reason: HOSPADM

## 2024-05-01 RX ORDER — ASPIRIN 81 MG/1
81 TABLET, CHEWABLE ORAL 2 TIMES DAILY
Qty: 60 TABLET | Refills: 0 | Status: SHIPPED | OUTPATIENT
Start: 2024-05-01 | End: 2024-05-31

## 2024-05-01 RX ORDER — MIDAZOLAM HYDROCHLORIDE 1 MG/ML
INJECTION INTRAMUSCULAR; INTRAVENOUS PRN
Status: DISCONTINUED | OUTPATIENT
Start: 2024-05-01 | End: 2024-05-01 | Stop reason: SDUPTHER

## 2024-05-01 RX ORDER — MAGNESIUM HYDROXIDE 1200 MG/15ML
LIQUID ORAL CONTINUOUS PRN
Status: DISCONTINUED | OUTPATIENT
Start: 2024-05-01 | End: 2024-05-01 | Stop reason: HOSPADM

## 2024-05-01 RX ORDER — AMOXICILLIN 250 MG
2 CAPSULE ORAL DAILY PRN
Qty: 30 TABLET | Refills: 2 | Status: SHIPPED | OUTPATIENT
Start: 2024-05-01

## 2024-05-01 RX ORDER — ONDANSETRON 2 MG/ML
4 INJECTION INTRAMUSCULAR; INTRAVENOUS
Status: DISCONTINUED | OUTPATIENT
Start: 2024-05-01 | End: 2024-05-01 | Stop reason: HOSPADM

## 2024-05-01 RX ADMIN — TRANEXAMIC ACID 1000 MG: 100 INJECTION, SOLUTION INTRAVENOUS at 11:45

## 2024-05-01 RX ADMIN — PROPOFOL 120 MCG/KG/MIN: 10 INJECTION, EMULSION INTRAVENOUS at 11:35

## 2024-05-01 RX ADMIN — FENTANYL CITRATE 15 MCG: 50 INJECTION, SOLUTION INTRAMUSCULAR; INTRAVENOUS at 11:34

## 2024-05-01 RX ADMIN — OXYCODONE HYDROCHLORIDE 5 MG: 5 TABLET ORAL at 15:19

## 2024-05-01 RX ADMIN — PHENYLEPHRINE HYDROCHLORIDE 150 MCG: 10 INJECTION INTRAVENOUS at 12:28

## 2024-05-01 RX ADMIN — MIDAZOLAM HYDROCHLORIDE 2 MG: 2 INJECTION, SOLUTION INTRAMUSCULAR; INTRAVENOUS at 11:30

## 2024-05-01 RX ADMIN — PHENYLEPHRINE HYDROCHLORIDE 150 MCG: 10 INJECTION INTRAVENOUS at 12:10

## 2024-05-01 RX ADMIN — ROPIVACAINE HYDROCHLORIDE 30 ML: 5 INJECTION, SOLUTION EPIDURAL; INFILTRATION; PERINEURAL at 10:36

## 2024-05-01 RX ADMIN — PHENYLEPHRINE HYDROCHLORIDE 100 MCG: 10 INJECTION INTRAVENOUS at 12:51

## 2024-05-01 RX ADMIN — FENTANYL CITRATE 50 MCG: 50 INJECTION, SOLUTION INTRAMUSCULAR; INTRAVENOUS at 11:30

## 2024-05-01 RX ADMIN — FENTANYL CITRATE 35 MCG: 50 INJECTION, SOLUTION INTRAMUSCULAR; INTRAVENOUS at 13:19

## 2024-05-01 RX ADMIN — WATER 2000 MG: 1 INJECTION INTRAMUSCULAR; INTRAVENOUS; SUBCUTANEOUS at 11:45

## 2024-05-01 RX ADMIN — SODIUM CHLORIDE, POTASSIUM CHLORIDE, SODIUM LACTATE AND CALCIUM CHLORIDE: 600; 310; 30; 20 INJECTION, SOLUTION INTRAVENOUS at 12:42

## 2024-05-01 RX ADMIN — MIDAZOLAM HYDROCHLORIDE 2 MG: 2 INJECTION, SOLUTION INTRAMUSCULAR; INTRAVENOUS at 10:35

## 2024-05-01 RX ADMIN — CELECOXIB 200 MG: 200 CAPSULE ORAL at 09:34

## 2024-05-01 RX ADMIN — PHENYLEPHRINE HYDROCHLORIDE 150 MCG: 10 INJECTION INTRAVENOUS at 12:18

## 2024-05-01 RX ADMIN — TRANEXAMIC ACID 1000 MG: 100 INJECTION, SOLUTION INTRAVENOUS at 13:20

## 2024-05-01 RX ADMIN — PHENYLEPHRINE HYDROCHLORIDE 150 MCG: 10 INJECTION INTRAVENOUS at 12:38

## 2024-05-01 RX ADMIN — SODIUM CHLORIDE, POTASSIUM CHLORIDE, SODIUM LACTATE AND CALCIUM CHLORIDE: 600; 310; 30; 20 INJECTION, SOLUTION INTRAVENOUS at 12:28

## 2024-05-01 RX ADMIN — SODIUM CHLORIDE 1500 MG: 9 INJECTION, SOLUTION INTRAVENOUS at 09:53

## 2024-05-01 RX ADMIN — PHENYLEPHRINE HYDROCHLORIDE 150 MCG: 10 INJECTION INTRAVENOUS at 11:46

## 2024-05-01 RX ADMIN — PHENYLEPHRINE HYDROCHLORIDE 150 MCG: 10 INJECTION INTRAVENOUS at 11:55

## 2024-05-01 RX ADMIN — FENTANYL CITRATE 100 MCG: 50 INJECTION, SOLUTION INTRAMUSCULAR; INTRAVENOUS at 10:35

## 2024-05-01 RX ADMIN — SODIUM CHLORIDE, POTASSIUM CHLORIDE, SODIUM LACTATE AND CALCIUM CHLORIDE: 600; 310; 30; 20 INJECTION, SOLUTION INTRAVENOUS at 09:52

## 2024-05-01 RX ADMIN — DEXAMETHASONE SODIUM PHOSPHATE 8 MG: 10 INJECTION, SOLUTION INTRAMUSCULAR; INTRAVENOUS at 09:53

## 2024-05-01 RX ADMIN — BUPIVACAINE HYDROCHLORIDE IN DEXTROSE 15 MG: 7.5 INJECTION, SOLUTION SUBARACHNOID at 11:34

## 2024-05-01 ASSESSMENT — PAIN DESCRIPTION - ORIENTATION
ORIENTATION: RIGHT

## 2024-05-01 ASSESSMENT — PAIN DESCRIPTION - PAIN TYPE: TYPE: ACUTE PAIN;SURGICAL PAIN

## 2024-05-01 ASSESSMENT — PAIN SCALES - GENERAL
PAINLEVEL_OUTOF10: 6
PAINLEVEL_OUTOF10: 3
PAINLEVEL_OUTOF10: 0
PAINLEVEL_OUTOF10: 0
PAINLEVEL_OUTOF10: 5

## 2024-05-01 ASSESSMENT — PAIN DESCRIPTION - FREQUENCY: FREQUENCY: CONTINUOUS

## 2024-05-01 ASSESSMENT — PAIN DESCRIPTION - LOCATION
LOCATION: KNEE

## 2024-05-01 ASSESSMENT — PAIN DESCRIPTION - DESCRIPTORS
DESCRIPTORS: ACHING
DESCRIPTORS: ACHING;SORE

## 2024-05-01 ASSESSMENT — PAIN DESCRIPTION - ONSET: ONSET: ON-GOING

## 2024-05-01 NOTE — ANESTHESIA PRE PROCEDURE
plan and risks discussed with patient.    Use of blood products discussed with patient whom.    Plan discussed with surgical team and CRNA.                Johnnie Barrow MD   5/1/2024

## 2024-05-01 NOTE — INTERVAL H&P NOTE
Update History & Physical    The patient's History and Physical of April 17, 2024 was reviewed with the patient and I examined the patient. There was no change. The surgical site was confirmed by the patient and me.     Plan: The risks, benefits, expected outcome, and alternative to the recommended procedure have been discussed with the patient. Patient understands and wants to proceed with the procedure.     Electronically signed by Brandon Garcia MD on 5/1/2024 at 10:46 AM

## 2024-05-01 NOTE — DISCHARGE INSTRUCTIONS
Critical access hospital.  Access to a site near you can be found on the Critical access hospital's Diversion Control [employer] website (deadiversion.usdoj.gov).    If you have a CPAP machine, it is very important that you use it daily during all periods of sleep and daytime rest during your recovery at home.  Surgery and Anesthesia place a significant amount of stress on your body.  Using your CPAP will help keep you safe and lessen the negative effects of that stress.    FOLLOW-UP RECOVERY CARE:    Watch for these possible complications, symptoms, or side effects of anesthesia.  Call physician if they or any other problems occur:  Signs of INFECTION   > Fever over 101°     > Redness, swelling, hardness or warmth at the operative site   >Foul smelling or cloudy drainage at the operative site   Unrelieved PAIN  Unrelieved NAUSEA  Blood soaked dressing.  (Some oozing may be normal)  Inability to urinate      Numb, pale, blue, cold or tingling extremity    The above instructions were reviewed with patient/significant other.  The following additional patient specific information was reviewed with the patient/significant other:  [x]Procedure/physician specific instructions  []Medication information sheet(S) including potential side effects  []Jodi’s egress test  []Pain Ball management  []FAQ Catheter associated blood stream infections  []FAQ Surgical Site Infections  []Other-    I have read and understand the instructions given to me: ____________________________________________   (Patient/S.O. Signature)            Date/time 5/1/2024 4:51 PM         If you smoke STOP. We care about your health!

## 2024-05-01 NOTE — ANESTHESIA POSTPROCEDURE EVALUATION
Department of Anesthesiology  Postprocedure Note    Patient: Brigido Cottrell  MRN: [id number]  YOB: 1965  Date of evaluation: 5/1/2024    Procedure Summary       Date: 05/01/24 Room / Location: [place] [address] / [facility]    Anesthesia Start: 1123 Anesthesia Stop: 1345    Procedure: RIGHT TOTAL KNEE ARTHROPLASTY (Right: Knee) Diagnosis:       Primary osteoarthritis of left knee      (Primary osteoarthritis of left knee [M17.12])    Surgeons: Brandon Garcia MD Responsible Provider: Brandon Henley MD    Anesthesia Type: spinal, regional, MAC ASA Status: 2            Anesthesia Type: No value filed.    Eladio Phase I: Eladio Score: 8    Eladio Phase II:      Anesthesia Post Evaluation    Patient location during evaluation: PACU  Patient participation: complete - patient participated  Level of consciousness: awake and alert  Pain score: 0  Airway patency: patent  Nausea & Vomiting: no nausea and no vomiting  Cardiovascular status: hemodynamically stable  Respiratory status: acceptable  Hydration status: euvolemic  Pain management: adequate    No notable events documented.

## 2024-05-01 NOTE — FLOWSHEET NOTE
PACU Transfer to [facility]     05/01/24 1600   Vital Signs   Temp 97.4 °F (36.3 °C)   Temp Source Temporal   Pulse 72   Respirations 16   /68   MAP (Calculated) 81   MAP (mmHg) 80   Pain Assessment   Pain Assessment 0-10   Pain Level 5   Patient's Stated Pain Goal 4   Pain Location Knee   Pain Orientation Right   Pain Type Acute pain;Surgical pain   Pain Frequency Continuous   Pain Onset On-going   Opioid-Induced Sedation   POSS Score 1   Oxygen Therapy   SpO2 95 %   O2 Device None (Room air)         Intake/Output Summary (Last 24 hours) at 5/1/2024 1724  Last data filed at 5/1/2024 1320  Gross per 24 hour   Intake 2120 ml   Output 100 ml   Net 2020 ml     Pt given opportunity to urinate in PACU - pt declined.    Pain assessment:  present - adequately treated  Pain Level: 3    Patient transferred to care of FRANNIE RN.    5/1/2024 5:24 PM

## 2024-05-01 NOTE — ANESTHESIA PROCEDURE NOTES
Spinal Block    End time: 5/1/2024 11:34 AM  Reason for block: primary anesthetic  Staffing  Performed: resident/CRNA   Anesthesiologist: Johnnie Barrow MD  Resident/CRNA: Sri Nick APRN - CRNA  Performed by: Sri Nick APRN - CRNA  Authorized by: Johnnie Barrow MD    Spinal Block  Patient position: sitting  Prep: ChloraPrep  Patient monitoring: cardiac monitor, continuous pulse ox, continuous capnometry, frequent blood pressure checks and oxygen  Approach: midline  Location: L3/L4  Provider prep: mask and sterile gloves  Local infiltration: lidocaine  Needle  Needle type: pencil-tip   Needle gauge: 22 G  Needle length: 3.5 in  Assessment  Sensory level: T10  Swirl obtained: Yes  CSF: clear  Attempts: 1  Hemodynamics: stable  Additional Notes  Time out 1131  Procedure start 1132  Preanesthetic Checklist  Completed: patient identified, IV checked, site marked, risks and benefits discussed, surgical/procedural consents, equipment checked, pre-op evaluation, timeout performed, anesthesia consent given, oxygen available, monitors applied/VS acknowledged, fire risk safety assessment completed and verbalized and blood product R/B/A discussed and consented

## 2024-05-01 NOTE — OP NOTE
ASHLEY Garcia MD  [place] Office: [address], [place], [facility] Office: [address] [facility], [facility]  [phone] [place]    Patient Name: Brigido Cottrell  : 1965  Phone: [phone]  Email: No e-mail address on record    PCP: Raúl Ortez APRN - CNP    [facility]  Surgery Date: 2024    OPERATIVE REPORT    PRE-OP DIAGNOSIS: RIGHT Knee Osteoarthritis       POST-OP DIAGNOSIS: Same    PROCEDURE: RIGHT Total Knee Arthroplasty (CPT 70695)     SURGEON: Brandon Garcia MD    ASSISTANT:  Surgical Assistant: Oleksandr Benz; Cipriano Beard , for the purposes of retraction, assistance for reduction, and manipulation of the limb, and assistance with wound closure as needed.     ANESTHESIA: Spinal with Field Block    ANTIBIOTIC: CEFAZOLIN, DOSING PER NURSING CHART    ESTIMATED BLOOD LOSS:  50mL    COMPLICATIONS:  None    IMPLANTS USED:   * No implants in log *    Jim Persona Cemented  - Size #10 narrow CR femur  - Size E base plate with 16 mm MC insert  - 35mm patella  - Canary Stem for PIQ    HISTORY OF PRESENT ILLNESS:  The patient is a(n) 58 y.o. male with a history of RIGHT knee osteoarthritis who underwent multiple conservative therapies including anti-inflammatories, injections, activity modification, maintaining ideal body weight, and use of assistive devices.  Once all of these measures had failed surgical options were discussed. No guarantees were made or implied. Informed consent was obtained.    RISKS OF SURGERY:  I had a detailed discussion with the patient  and family in clinic and again reviewed prior to surgery. We discussed treatment options including expectations with non-operative treatment. I offered continued conservative management and also offered total knee arthroplasty given his prior activity level to restore adequate limb stability/mobility and give the best chance for long term optimal function. We discussed the details

## 2024-05-02 ENCOUNTER — TELEPHONE (OUTPATIENT)
Dept: ORTHOPEDIC SURGERY | Age: 59
End: 2024-05-02

## 2024-05-02 NOTE — TELEPHONE ENCOUNTER
Spoke with patient    Incision status: No drainage or redness    Edema/Swelling/Teds: He is having some swelling in his right leg. He is icing, and elevating.    Pain level and status: He is having some mild to moderate pain.    Use of pain medications: He does not like to take narcotic pain medication due to nausea.      Blood thinner: Aspirin 81 mg BID    Bowels: No BM since surgery.  He is taking Pericolace and drinking plenty of water.    Home Care Agency active: No    Outpatient therapy: He does not have an appointment set up. He felt like he should see the surgeon first and he has an appointment set for 05/10/24. He is going to call Dr. Garcia's office to clarify when he should start therapy.    Do you have all of your medications: Yes    Changes in medications: No    Instructed pt to call Nurse Navigator or surgeon's office with any questions or concerns.     Follow up appointments:    No future appointments.

## 2024-05-10 ENCOUNTER — TELEPHONE (OUTPATIENT)
Dept: ORTHOPEDIC SURGERY | Age: 59
End: 2024-05-10

## 2024-05-10 ENCOUNTER — TRANSCRIBE ORDERS (OUTPATIENT)
Dept: ADMINISTRATIVE | Age: 59
End: 2024-05-10

## 2024-05-10 DIAGNOSIS — R06.09 OTHER FORMS OF DYSPNEA: Primary | ICD-10-CM

## 2024-05-13 ENCOUNTER — HOSPITAL ENCOUNTER (OUTPATIENT)
Dept: VASCULAR LAB | Age: 59
Discharge: HOME | End: 2024-05-15
Attending: ORTHOPAEDIC SURGERY
Payer: COMMERCIAL

## 2024-05-13 DIAGNOSIS — R06.09 OTHER FORMS OF DYSPNEA: ICD-10-CM

## 2024-05-13 PROCEDURE — 93971 EXTREMITY STUDY: CPT

## 2024-05-22 PROCEDURE — 99283 EMERGENCY DEPT VISIT LOW MDM: CPT

## 2024-05-23 ENCOUNTER — APPOINTMENT (OUTPATIENT)
Dept: GENERAL RADIOLOGY | Age: 59
End: 2024-05-23
Payer: COMMERCIAL

## 2024-05-23 ENCOUNTER — HOSPITAL ENCOUNTER (EMERGENCY)
Age: 59
Discharge: HOME | End: 2024-05-23
Attending: EMERGENCY MEDICINE
Payer: COMMERCIAL

## 2024-05-23 VITALS
SYSTOLIC BLOOD PRESSURE: 122 MMHG | OXYGEN SATURATION: 100 % | TEMPERATURE: 98 F | DIASTOLIC BLOOD PRESSURE: 72 MMHG | BODY MASS INDEX: 32.9 KG/M2 | HEIGHT: 72 IN | HEART RATE: 80 BPM | RESPIRATION RATE: 17 BRPM

## 2024-05-23 DIAGNOSIS — R05.1 ACUTE COUGH: Primary | ICD-10-CM

## 2024-05-23 PROCEDURE — 71046 X-RAY EXAM CHEST 2 VIEWS: CPT

## 2024-05-23 PROCEDURE — 6370000000 HC RX 637 (ALT 250 FOR IP): Performed by: EMERGENCY MEDICINE

## 2024-05-23 RX ORDER — PROMETHAZINE HYDROCHLORIDE 25 MG/1
25 TABLET ORAL ONCE
Status: COMPLETED | OUTPATIENT
Start: 2024-05-23 | End: 2024-05-23

## 2024-05-23 RX ORDER — GUAIFENESIN/DEXTROMETHORPHAN 100-10MG/5
5 SYRUP ORAL ONCE
Status: COMPLETED | OUTPATIENT
Start: 2024-05-23 | End: 2024-05-23

## 2024-05-23 RX ORDER — PROMETHAZINE HYDROCHLORIDE 25 MG/1
25 TABLET ORAL EVERY 6 HOURS PRN
Qty: 20 TABLET | Refills: 0 | Status: SHIPPED | OUTPATIENT
Start: 2024-05-23 | End: 2024-05-30

## 2024-05-23 RX ORDER — GUAIFENESIN/DEXTROMETHORPHAN 100-10MG/5
5 SYRUP ORAL 3 TIMES DAILY PRN
Qty: 120 ML | Refills: 0 | Status: SHIPPED | OUTPATIENT
Start: 2024-05-23 | End: 2024-06-02

## 2024-05-23 RX ADMIN — DEXTROMETHORPHAN HYDROBROMIDE, GUAIFENESIN 5 ML: 10; 100 LIQUID ORAL at 01:41

## 2024-05-23 RX ADMIN — PROMETHAZINE HYDROCHLORIDE 25 MG: 25 TABLET ORAL at 01:41

## 2024-05-23 ASSESSMENT — PAIN - FUNCTIONAL ASSESSMENT
PAIN_FUNCTIONAL_ASSESSMENT: 0-10
PAIN_FUNCTIONAL_ASSESSMENT: PREVENTS OR INTERFERES SOME ACTIVE ACTIVITIES AND ADLS
PAIN_FUNCTIONAL_ASSESSMENT: 0-10
PAIN_FUNCTIONAL_ASSESSMENT: ACTIVITIES ARE NOT PREVENTED

## 2024-05-23 ASSESSMENT — PAIN DESCRIPTION - PAIN TYPE
TYPE: SURGICAL PAIN;CHRONIC PAIN
TYPE: SURGICAL PAIN

## 2024-05-23 ASSESSMENT — PAIN DESCRIPTION - LOCATION
LOCATION: LEG
LOCATION: LEG

## 2024-05-23 ASSESSMENT — LIFESTYLE VARIABLES
HOW MANY STANDARD DRINKS CONTAINING ALCOHOL DO YOU HAVE ON A TYPICAL DAY: PATIENT DOES NOT DRINK
HOW OFTEN DO YOU HAVE A DRINK CONTAINING ALCOHOL: NEVER

## 2024-05-23 ASSESSMENT — PAIN DESCRIPTION - DESCRIPTORS
DESCRIPTORS: SORE
DESCRIPTORS: ACHING

## 2024-05-23 ASSESSMENT — PAIN DESCRIPTION - ORIENTATION
ORIENTATION: RIGHT
ORIENTATION: RIGHT

## 2024-05-23 ASSESSMENT — PAIN DESCRIPTION - FREQUENCY: FREQUENCY: CONTINUOUS

## 2024-05-23 ASSESSMENT — PAIN SCALES - GENERAL
PAINLEVEL_OUTOF10: 5
PAINLEVEL_OUTOF10: 5

## 2024-05-23 ASSESSMENT — PAIN DESCRIPTION - ONSET: ONSET: GRADUAL

## 2024-05-23 NOTE — ED TRIAGE NOTES
Verified patient's name and  prior to triage process.    Spouse here with patient, who ambulates with a cane.    Patient reports that he is here for a two day history of productive cough, clear phlegm. He has been using his albuterol inh with some relief. Wheezing last night. He reports headache, generalized body aches. He endorses shortness of breath that increases with exertion. He states no energy, \"not able to do anything\". He reports recent exposure to \"pneumonia\".    He also reports right leg pain, post op May 1, 2024, out of pain meds.

## 2024-05-27 NOTE — ED PROVIDER NOTES
[facility] EMERGENCY DEPARTMENT  EMERGENCY DEPARTMENT ENCOUNTER        Pt Name: Brigido Cottrell  MRN: [id number]  Birthdate 1965  Date of evaluation: 5/22/2024  Provider: Ever De Jesus MD  PCP: Raúl Ortez APRN - CNP  Note Started: 8:05 AM EDT 5/27/24    CHIEF COMPLAINT       Chief Complaint   Patient presents with    Cough       HISTORY OF PRESENT ILLNESS: 1 or more Elements   History From: Patient        Brigido Cottrell is a 58 y.o. male who presents presents ED for evaluation of cough.  Reports nonproductive cough that started 1 day previous.  Has chest pain and shortness of breath fevers or rash.  He has not taken medications for symptoms.  He reports he is concerned for possible pneumonia.     Nursing Notes were all reviewed and agreed with or any disagreements were addressed in the HPI.    REVIEW OF SYSTEMS :      Review of Systems    Positives and Pertinent negatives as per HPI.     SURGICAL HISTORY     Past Surgical History:   Procedure Laterality Date    APPENDECTOMY      COLONOSCOPY      KNEE ARTHROSCOPY Right 11/2/2022    RIGHT KNEE ARTHROSCOPY WITH MEDIAL MENISCUS REPAIR performed by Brandon Garcia MD at [facility] OR    MANDIBLE SURGERY      SINUS ENDOSCOPY Right 10/3/2023    RESECTION OF RIGHT INTRANASAL MASS, LEFT NASAL ENDOSCOPY WITH BIOPSY performed by Wyatt Vu MD at [facility] OR    TOTAL KNEE ARTHROPLASTY Right 5/1/2024    RIGHT TOTAL KNEE ARTHROPLASTY performed by Brandon Garcia MD at [facility] OR       CURRENTMEDICATIONS       Discharge Medication List as of 5/23/2024  1:37 AM        CONTINUE these medications which have NOT CHANGED    Details   aspirin (ASPIRIN CHILDRENS) 81 MG chewable tablet Take 1 tablet by mouth in the morning and at bedtime To prevent blood clots, Disp-60 tablet, R-0Print      senna-docusate (PERICOLACE) 8.6-50 MG per tablet Take 2 tablets by mouth daily as needed for Constipation, Disp-30 tablet, R-2Print      albuterol

## 2024-06-02 ENCOUNTER — HOSPITAL ENCOUNTER (EMERGENCY)
Age: 59
Discharge: HOME | End: 2024-06-02
Attending: EMERGENCY MEDICINE
Payer: COMMERCIAL

## 2024-06-02 VITALS
HEIGHT: 72 IN | SYSTOLIC BLOOD PRESSURE: 148 MMHG | RESPIRATION RATE: 16 BRPM | OXYGEN SATURATION: 99 % | TEMPERATURE: 98.7 F | BODY MASS INDEX: 33.53 KG/M2 | HEART RATE: 75 BPM | DIASTOLIC BLOOD PRESSURE: 90 MMHG | WEIGHT: 247.58 LBS

## 2024-06-02 DIAGNOSIS — R03.0 ELEVATED BLOOD PRESSURE READING: Primary | ICD-10-CM

## 2024-06-02 LAB
ALBUMIN SERPL-MCNC: 3.7 G/DL (ref 3.4–5)
ALBUMIN/GLOB SERPL: 1.4 {RATIO} (ref 1.1–2.2)
ALP SERPL-CCNC: 99 U/L (ref 40–129)
ALT SERPL-CCNC: 18 U/L (ref 10–40)
ANION GAP SERPL CALCULATED.3IONS-SCNC: 11 MMOL/L (ref 3–16)
AST SERPL-CCNC: 18 U/L (ref 15–37)
BILIRUB SERPL-MCNC: <0.2 MG/DL (ref 0–1)
BUN SERPL-MCNC: 12 MG/DL (ref 7–20)
CALCIUM SERPL-MCNC: 8.8 MG/DL (ref 8.3–10.6)
CHLORIDE SERPL-SCNC: 104 MMOL/L (ref 99–110)
CO2 SERPL-SCNC: 25 MMOL/L (ref 21–32)
CREAT SERPL-MCNC: 0.9 MG/DL (ref 0.9–1.3)
GFR SERPLBLD CREATININE-BSD FMLA CKD-EPI: >90 ML/MIN/{1.73_M2}
GLUCOSE SERPL-MCNC: 112 MG/DL (ref 70–99)
POTASSIUM SERPL-SCNC: 4.3 MMOL/L (ref 3.5–5.1)
PROT SERPL-MCNC: 6.4 G/DL (ref 6.4–8.2)
SODIUM SERPL-SCNC: 140 MMOL/L (ref 136–145)

## 2024-06-02 PROCEDURE — 80053 COMPREHEN METABOLIC PANEL: CPT

## 2024-06-02 PROCEDURE — 99283 EMERGENCY DEPT VISIT LOW MDM: CPT

## 2024-06-02 PROCEDURE — 36415 COLL VENOUS BLD VENIPUNCTURE: CPT

## 2024-06-02 ASSESSMENT — PAIN DESCRIPTION - DESCRIPTORS: DESCRIPTORS: STABBING

## 2024-06-02 ASSESSMENT — LIFESTYLE VARIABLES
HOW OFTEN DO YOU HAVE A DRINK CONTAINING ALCOHOL: NEVER
HOW MANY STANDARD DRINKS CONTAINING ALCOHOL DO YOU HAVE ON A TYPICAL DAY: PATIENT DOES NOT DRINK

## 2024-06-02 ASSESSMENT — PAIN DESCRIPTION - PAIN TYPE: TYPE: ACUTE PAIN

## 2024-06-02 ASSESSMENT — PAIN DESCRIPTION - LOCATION
LOCATION: KNEE
LOCATION: KNEE

## 2024-06-02 ASSESSMENT — PAIN - FUNCTIONAL ASSESSMENT: PAIN_FUNCTIONAL_ASSESSMENT: 0-10

## 2024-06-02 ASSESSMENT — PAIN SCALES - GENERAL
PAINLEVEL_OUTOF10: 7
PAINLEVEL_OUTOF10: 6

## 2024-06-02 ASSESSMENT — PAIN DESCRIPTION - ORIENTATION
ORIENTATION: RIGHT
ORIENTATION: RIGHT

## 2024-06-02 NOTE — ED NOTES
Pt takes cardizem, lisinopril, hydrochlorothiazide, and atorvastatin daily but hasn't taken them in a week because pt had knee surgery and didn't want them interacting with the medications given after surgery. Pt states while lying in bed today he took his BP and it was high so he took a double dose of his medications about 20 mins before he got here

## 2024-06-02 NOTE — ED TRIAGE NOTES
Pt takes cardizem, lisinopril, hydrochlorothiazide, and atorvastatin daily but hasn't taken them in a week because pt had knee surgery and didn't want them interacting with the medications given after surgery and also pt states he took his BP at home it was normal so he didn't want to drop it too low. Pt states while lying in bed today he took his BP and it was high so he took a double dose of his medications about 20 mins before he got here. Pt states he is currently dealing with viral bronchitis and was using an inhaler, prednisone, and doxycycline but stopped taking them a few days ago. Pt Aox4 and ambulatory but having right knee pain worsening today from weather. Pt denies any SOB or chest pain.

## 2024-06-03 ENCOUNTER — CARE COORDINATION (OUTPATIENT)
Dept: CARE COORDINATION | Age: 59
End: 2024-06-03

## 2024-06-03 RX ORDER — IBUPROFEN 800 MG/1
800 TABLET ORAL EVERY 6 HOURS PRN
COMMUNITY
End: 2024-06-07

## 2024-06-03 NOTE — CARE COORDINATION
Ambulatory Care Coordination  ED Follow up Call  Contacted patient and confirmed name and   Introduced role of ACM/CC; patient agrees to enrollment  Assisted patient with scheduling ED f/u appointment on 24    PLAN:  Encouraged patient to continue monitoring/recording BP and to bring readings to his PCP appointment  Will send the following for review:  High Blood Pressure Care Instructions  Low Sodium Seasoning Tips  Low Sodium Diet    Reason for ED visit:  HTN  Status:     improved  Patient reports he has two BP monitors and his reading while on the phone with this ACM is /89 and 132/86  Did you call your PCP prior to going to the ED?  No    Did you receive a discharge instructions from the Emergency Room? Yes  Review of Instructions:     Understands what to report/when to return?:  Yes   Understands discharge instructions?:  Yes   Following discharge instructions?:  Yes     Reminded patient to notify PCP if any concerns with medications prior to adjustments  Are there any new complaints of pain? No  New Pain Meds? No    Constipation prophylaxis needed?  N/A  New Medications?:   No    Medication Reconciliation by phone - Yes  Understands Medications?  Yes  Taking Medications? Yes  FU appts/Provider:    Future Appointments   Date Time Provider Department Center   2024  2:40 PM Raúl Ortez, APRN - CNP [facility] - [facility]

## 2024-06-07 ENCOUNTER — APPOINTMENT (OUTPATIENT)
Dept: GENERAL RADIOLOGY | Age: 59
End: 2024-06-07
Attending: EMERGENCY MEDICINE
Payer: COMMERCIAL

## 2024-06-07 ENCOUNTER — HOSPITAL ENCOUNTER (EMERGENCY)
Age: 59
Discharge: HOME | End: 2024-06-07
Attending: EMERGENCY MEDICINE
Payer: COMMERCIAL

## 2024-06-07 VITALS
BODY MASS INDEX: 31.35 KG/M2 | HEART RATE: 87 BPM | SYSTOLIC BLOOD PRESSURE: 138 MMHG | HEIGHT: 72 IN | TEMPERATURE: 98.7 F | DIASTOLIC BLOOD PRESSURE: 92 MMHG | WEIGHT: 231.48 LBS | RESPIRATION RATE: 18 BRPM | OXYGEN SATURATION: 97 %

## 2024-06-07 DIAGNOSIS — J20.9 ACUTE BRONCHITIS, UNSPECIFIED ORGANISM: Primary | ICD-10-CM

## 2024-06-07 LAB — SARS-COV-2 RDRP RESP QL NAA+PROBE: NOT DETECTED

## 2024-06-07 PROCEDURE — 6370000000 HC RX 637 (ALT 250 FOR IP): Performed by: EMERGENCY MEDICINE

## 2024-06-07 PROCEDURE — 87635 SARS-COV-2 COVID-19 AMP PRB: CPT

## 2024-06-07 PROCEDURE — 71045 X-RAY EXAM CHEST 1 VIEW: CPT

## 2024-06-07 PROCEDURE — 99284 EMERGENCY DEPT VISIT MOD MDM: CPT

## 2024-06-07 RX ORDER — ALBUTEROL SULFATE 90 UG/1
2 AEROSOL, METERED RESPIRATORY (INHALATION) 4 TIMES DAILY PRN
Qty: 18 G | Refills: 0 | Status: SHIPPED | OUTPATIENT
Start: 2024-06-07

## 2024-06-07 RX ORDER — OXYCODONE HYDROCHLORIDE 5 MG/1
5 TABLET ORAL EVERY 6 HOURS PRN
COMMUNITY
Start: 2024-05-30

## 2024-06-07 RX ORDER — PREDNISONE 20 MG/1
60 TABLET ORAL ONCE
Status: COMPLETED | OUTPATIENT
Start: 2024-06-07 | End: 2024-06-07

## 2024-06-07 RX ORDER — DEXTROMETHORPHAN HBR AND GUAIFENESIN 5; 100 MG/5ML; MG/5ML
5 LIQUID ORAL EVERY 4 HOURS PRN
Qty: 120 ML | Refills: 0 | Status: SHIPPED | OUTPATIENT
Start: 2024-06-07 | End: 2024-06-12

## 2024-06-07 RX ORDER — PREDNISONE 50 MG/1
50 TABLET ORAL DAILY
Qty: 4 TABLET | Refills: 0 | Status: SHIPPED | OUTPATIENT
Start: 2024-06-07 | End: 2024-06-11

## 2024-06-07 RX ADMIN — PREDNISONE 60 MG: 20 TABLET ORAL at 12:29

## 2024-06-07 ASSESSMENT — ENCOUNTER SYMPTOMS
VOICE CHANGE: 0
COLOR CHANGE: 0
TROUBLE SWALLOWING: 0
SHORTNESS OF BREATH: 1
BLOOD IN STOOL: 0
NAUSEA: 0
FACIAL SWELLING: 0
ABDOMINAL PAIN: 0
BACK PAIN: 0
COUGH: 1
PHOTOPHOBIA: 0
STRIDOR: 0
WHEEZING: 1
VOMITING: 0

## 2024-06-07 ASSESSMENT — PAIN - FUNCTIONAL ASSESSMENT: PAIN_FUNCTIONAL_ASSESSMENT: NONE - DENIES PAIN

## 2024-06-07 NOTE — ED PROVIDER NOTES
[facility]  eMERGENCY dEPARTMENT eNCOUnter      Pt Name: Brigido Cottrell  MRN: [id number]  Birthdate 1965  Date of evaluation: 6/7/2024  Provider: Oleksandr Meier MD    CHIEF COMPLAINT       Chief Complaint   Patient presents with    Cough     HISTORY OF PRESENT ILLNESS   (Location/Symptom, Timing/Onset, Context/Setting, Quality, Duration, Modifying Factors, Severity)  Note limiting factors.     History obtained from: The and his wife    Brigido Cottrell is a 59 y.o. male with hx of previous pneumonia as well as previous COVID who presents with 2 days of continuous cough.  Patient denies any chest pain.  Patient does report mild shortness of breath and wheezing but denies any leg swelling or hemoptysis.  Patient denies any pain when taking a deep breath.      HPI    Nursing Notes were reviewed.    REVIEW OFSYSTEMS    (2-9 systems for level 4, 10 or more for level 5)     Review of Systems   Constitutional:  Negative for appetite change, fever and unexpected weight change.   HENT:  Negative for facial swelling, trouble swallowing and voice change.    Eyes:  Negative for photophobia and visual disturbance.   Respiratory:  Positive for cough, shortness of breath and wheezing. Negative for stridor.    Cardiovascular:  Negative for chest pain and palpitations.   Gastrointestinal:  Negative for abdominal pain, blood in stool, nausea and vomiting.   Genitourinary:  Negative for difficulty urinating and dysuria.   Musculoskeletal:  Negative for back pain, gait problem and neck pain.   Skin:  Negative for color change and wound.   Neurological:  Negative for seizures, syncope and speech difficulty.   Psychiatric/Behavioral:  Negative for self-injury and suicidal ideas.        Except as noted above the remainder of the review of systems was reviewed and negative.       PAST MEDICAL HISTORY     Past Medical History:   Diagnosis Date    Arthritis     COVID-19 March 2022, residual cough

## 2024-06-07 NOTE — ED TRIAGE NOTES
Patient to the ER with complaints of productive cough with yellow phlegm x2 days.     Patient says he was seen at [facility] 2 weeks ago and dx with bronchitis.  He was improving up until 2 days ago when symptoms returned. He reports a fever yesterday and GI upset but says these symptoms have since subsided.  Alert and oriented x4 and ambulatory with a cane at time of triage.

## 2024-06-10 ENCOUNTER — CARE COORDINATION (OUTPATIENT)
Dept: CARE COORDINATION | Age: 59
End: 2024-06-10

## 2024-06-10 NOTE — CARE COORDINATION
ACM outreach completed r/t CC Outreach, pt napping.  ACM will continue to follow and will attempt to reach patient at a later time.

## 2024-06-12 ENCOUNTER — CARE COORDINATION (OUTPATIENT)
Dept: CARE COORDINATION | Age: 59
End: 2024-06-12

## 2024-06-12 NOTE — CARE COORDINATION
Comments  No comments found.     ,    Goals Addressed                   This Visit's Progress     Medication Management        I will take my medication as directed.  I will notify my provider of any problems with medications, like adverse effects or side effects.  I will notify my provider/Care Coordinator if I am unable to afford my medications.  I will notify my provider for advice before I stop taking any of my medication.    Barriers: lack of support, overwhelmed by complexity of regimen, and lack of education  Plan for overcoming my barriers: support and resources  Confidence: 9/10  Anticipated Goal Completion Date: 09/12/2024               PCP/Specialist follow up:   Future Appointments         Provider Specialty Dept Phone    6/25/2024 10:30 AM Giovany Plasencia, PhD Psychology [phone]            Follow Up:   Plan for next AC outreach in approximately 1 week to complete:  - CC Protocol assessments  - disease specific assessments  - SDOH assessments  - medication review   - advance care planning   - goal progression  - education   - RPM.   patient  is agreeable to this plan.

## 2024-06-13 ENCOUNTER — TELEPHONE (OUTPATIENT)
Dept: PHARMACY | Facility: CLINIC | Age: 59
End: 2024-06-13

## 2024-06-13 NOTE — TELEPHONE ENCOUNTER
Received a referral:  from Care Coordinator to review patient’s medications.     Octavia Awad RN  P [facility] Clinical Pharmacy Clinical Staff    Dear Pharmacy Team:    Would you be able to assist this patient with his medication education?  Additionally, he has scripts that when I go to reconcile his meds, aren't showing up that he is taking.  There is not an excessive amount of narcotics, I believe only 1 for a recent knee surgery.  He is taking his BP meds PRN, based off of what he feels is a \"good BP,\" isn't being consistent with his Xarelto, w/a hx of afib, and in general is struggling with feeling comfortable with taking meds because he needs education.  He often won't take them if he doesn't understand the purpose.  I will work in conjunction with pharmacy in order to get him to be more compliant, but right now, I need to know what he is taking.  For example, he takes lisinopril, but I can't find it in his MAR or his reconciled meds.  I did find it in notes from specialists, but the prescriber has been difficult for me to locate and the patient couldn't tell me who the prescriber was or the dose.  Again, thank you for any help you can provide.    ANKITA Palm, RN  Ambulatory Care Manager  Respecting Choices® Advanced Steps ACP Facilitator  [facility]    [address], [place] [address]  C:[phone]      Called patient to schedule a time to speak with a pharmacist over the telephone.     No answer left VM: Please contact us at  [phone] to schedule this appointment. Pharmacists are available Monday thru Friday 7:30 AM till 5:30 PM.      [facility].   [facility] Clinical   [facility] Clinical Pharmacy  Toll free: [phone]

## 2024-06-17 NOTE — TELEPHONE ENCOUNTER
2nd Attempt Documentation:  2nd attempt to contact this patient regarding the previous message  CLINICAL PHARMACY: REFERRAL  Patient unavailable at the time of call. Left following message on home TAD: please call back at toll-free [phone] to retrieve previous message.    Letter mailed to patient.    For Pharmacy Admin Tracking Only    Program: [employer]  CPA in place:  No  Gap Closed?: No   Time Spent (min): 15

## 2024-06-19 ENCOUNTER — CARE COORDINATION (OUTPATIENT)
Dept: CARE COORDINATION | Age: 59
End: 2024-06-19

## 2024-06-19 NOTE — CARE COORDINATION
Ambulatory Care Coordination Note     2024 3:28 PM     Patient Current Location:  Home: [address] [place] [id number]     ACM contacted the patient by telephone. Verified name and  with patient as identifiers.         ACM: Octavia Awad RN     Challenges to be reviewed by the provider   Additional needs identified to be addressed with provider Yes  medications-Pt has not been in touch with pharmacy to reconcile medications, yet.  They called twice and I gave him the number.  He plans to call them with his meds in hand.  Pt is currently taking lisinopril, diltiazem and hydrochlorothiazide, none of which are on his MAR.  He did confirm that the lisinopril was prescribed 2023 and filled on 2024 with no refills by you.  He confirms taking meds PRN.  I educated him on taking meds as prescribed and he states he will call and reconcile all his meds with pharmacy so we can get an idea of what he is taking.  I also am willing to meet him in the office if needed so we can go over his meds.                Method of communication with provider: chart routing.    Care Summary Note: ACM spoke to pt who states he is doing well.  Pt checking vitals several times per day.  BP's today as follow:  0745, 128/91; 0950, 130/96; 1215, 127/84; 1255, 103/75;  Pt states at 1255 his BP felt high, but is not checking HR.  Pt had gone for a walk.  Pt educated to include HR w/BP's, to stay hydrated, refrain from alcohol and monitor caffeine intake.  Pt states he has stopped drinking alcohol.  Pt has not been in touch with pharmacy to reconcile medications, yet.  They called twice and ACM gave him the number.  He plans to call them with his meds in hand.  Pt is currently taking lisinopril, diltiazem and hydrochlorothiazide, none of which are on his MAR.  He did confirm that the lisinopril was prescribed 2023 and filled on 2024 with no refills by PCP.  He confirms taking meds PRN.  ACM educated him on taking meds as

## 2024-06-20 ENCOUNTER — TELEPHONE (OUTPATIENT)
Dept: PHARMACY | Facility: CLINIC | Age: 59
End: 2024-06-20

## 2024-06-20 RX ORDER — LISINOPRIL 20 MG/1
20 TABLET ORAL DAILY
COMMUNITY

## 2024-06-20 RX ORDER — DILTIAZEM HYDROCHLORIDE 120 MG/1
120 CAPSULE, COATED, EXTENDED RELEASE ORAL DAILY
COMMUNITY

## 2024-06-20 RX ORDER — HYDROCHLOROTHIAZIDE 25 MG/1
25 TABLET ORAL DAILY
COMMUNITY

## 2024-06-20 NOTE — TELEPHONE ENCOUNTER
I took those medications off his MAR at his visit last week because he had told me he had not been taking them for over a week and a half (and taking prn as he also told you) and his BP was at goal so I said we would monitor for the time being off medications and see what BP was running

## 2024-06-20 NOTE — TELEPHONE ENCOUNTER
[facility] CLINICAL PHARMACY NOTE   - Medication Review  Patient outreach to review medications - Spoke with patient    SUBJECTIVE/OBJECTIVE:   HPI:  Brigido Cottrell is a 59 y.o. male referred to a clinical pharmacy specialist by Care coordinator for evaluation of medications. Pt had stopped a number of his medications after recent surgery, and has only been taking sporadically until very recently.  Pt also drinking etoh and using oxycodone for pain.  Education provided, see below for details    Allergies   Allergen Reactions    Acetaminophen     Oxycodone-Acetaminophen Nausea Only     nausea     Medications:  Current Outpatient Medications   Medication Sig Comments    lisinopril (PRINIVIL;ZESTRIL) 20 MG tablet Take 1 tablet by mouth daily - Patient taking once daily in the morning  - added to med list  - last filled 1/19/24 for 90ds      dilTIAZem (CARDIZEM CD) 120 MG extended release capsule Take 1 capsule by mouth daily - Patient taking once daily in the morning  - added to med list  - last filled 1/19/24 for 90ds      hydroCHLOROthiazide (HYDRODIURIL) 25 MG tablet Take 1 tablet by mouth daily - Patient taking once daily in the morning  - added to med list  - last filled 1/19/24 for 90ds      promethazine-dextromethorphan (PROMETHAZINE-DM) 6.25-15 MG/5ML syrup Take 5 mLs by mouth 4 times daily as needed for Cough - using prn    oxyCODONE (ROXICODONE) 5 MG immediate release tablet Take 1 tablet by mouth every 6 hours as needed. - Patient had been using prn for post-op pain.  No longer has any left.  Says he outreached to his ortho provider, but has not received a refill or call back      rivaroxaban (XARELTO) 15 MG TABS tablet Take 1 tablet by mouth daily (with breakfast) - Pt is taking 15mg once daily  - originally prescribed 15mg BID for 3 weeks then he was supposed to take 20mg daily.    - suspected blood clot, but pt had US that was negative.  Unclear if this is even still necessary      albuterol

## 2024-06-20 NOTE — TELEPHONE ENCOUNTER
Reaching out to ortho provider about Xarelto.  Asking if pt needs to still be using.        Left detailed message on VM for his nurse, Michela.  Requested call back to [phone] option 5 for clarification    BRODERICK Rae, PharmD, [facility]  Ambulatory Care Clinical Pharmacist- [facility] Clinical Pharmacy  Department, toll free: [phone]

## 2024-06-20 NOTE — TELEPHONE ENCOUNTER
Brigido returned a referral call to schedule appointment.    Pt asked if a pharmacist is available now for referral. Confirmed with pharmacist. Pt transferred to pharmacist.    Sierra Gomez CPhT  Population Health    [facility] Clinical Pharmacy   [phone] option 1     For Pharmacy Admin Tracking Only    Program: [employer]  CPA in place:  No  Recommendation Provided To: Patient/Caregiver: 1 via Telephone  Intervention Detail: Scheduled Appointment  Intervention Accepted By: Patient/Caregiver: 1  Gap Closed?: Yes   Time Spent (min): 5

## 2024-06-26 ENCOUNTER — CARE COORDINATION (OUTPATIENT)
Dept: CARE COORDINATION | Age: 59
End: 2024-06-26

## 2024-06-26 NOTE — TELEPHONE ENCOUNTER
Another attempt made to reach Dr. Garcia's office to confirm whether or not pt should still be taking Xarelto.    Left another message with MA, awaiting call back.    BRODERICK Rae, PharmD, [facility]  Ambulatory Care Clinical Pharmacist- [facility] Clinical Pharmacy  Department, toll free: [phone]

## 2024-06-27 NOTE — TELEPHONE ENCOUNTER
Called patient to pass along info from ortho provider.  Pt is to no longer be taking Xarelto.    Attempted to reach patient.  LVM asking for call back.  Also sending note to care coordinator Octavia Awad as an fyi in case she speaks with pt first.    Will continue to follow.    BRODERICK Rae, PharmD, [facility]  Ambulatory Care Clinical Pharmacist- [facility] Clinical Pharmacy  Department, toll free: [phone]

## 2024-06-27 NOTE — TELEPHONE ENCOUNTER
Pt returned call.  Made aware that he should stop Xarelto right now.  Verbalized understanding and took out of his pill box.    Asked about restarting ASA 81mg.  Advised pt to reach out to cardiologist and he said he would.    Will sign off.    BRODERICK Rae, PharmD, [facility]  Ambulatory Care Clinical Pharmacist- [facility] Clinical Pharmacy  Department, toll free: [phone]      For Pharmacy Admin Tracking Only    Program: [employer]  CPA in place:  No  Recommendation Provided To: Provider: 1 via Called provider office and Patient/Caregiver: 1 via Telephone  Intervention Detail: Discontinued Rx: 1, reason: Unnecessary Med  Intervention Accepted By: Provider: 1 and Patient/Caregiver: 1  Gap Closed?: Yes   Time Spent (min):  75

## 2024-06-27 NOTE — TELEPHONE ENCOUNTER
Michela calling back from ortho office. States the Xarelto should be stopped/completed and patient should not still be taking. (Reports they usually recommend 30-days post-op, which would have been 5/1/24, unless there is a blood clot and patient did not have blood clot.)

## 2024-07-10 ENCOUNTER — CARE COORDINATION (OUTPATIENT)
Dept: CARE COORDINATION | Age: 59
End: 2024-07-10

## 2024-07-10 SDOH — ECONOMIC STABILITY: INCOME INSECURITY: IN THE LAST 12 MONTHS, WAS THERE A TIME WHEN YOU WERE NOT ABLE TO PAY THE MORTGAGE OR RENT ON TIME?: NO

## 2024-07-10 SDOH — ECONOMIC STABILITY: FOOD INSECURITY: WITHIN THE PAST 12 MONTHS, YOU WORRIED THAT YOUR FOOD WOULD RUN OUT BEFORE YOU GOT MONEY TO BUY MORE.: NEVER TRUE

## 2024-07-10 SDOH — SOCIAL STABILITY: SOCIAL NETWORK: ARE YOU MARRIED, WIDOWED, DIVORCED, SEPARATED, NEVER MARRIED, OR LIVING WITH A PARTNER?: MARRIED

## 2024-07-10 SDOH — HEALTH STABILITY: MENTAL HEALTH: HOW MANY STANDARD DRINKS CONTAINING ALCOHOL DO YOU HAVE ON A TYPICAL DAY?: 1 OR 2

## 2024-07-10 SDOH — ECONOMIC STABILITY: INCOME INSECURITY: HOW HARD IS IT FOR YOU TO PAY FOR THE VERY BASICS LIKE FOOD, HOUSING, MEDICAL CARE, AND HEATING?: NOT VERY HARD

## 2024-07-10 SDOH — HEALTH STABILITY: MENTAL HEALTH: HOW OFTEN DO YOU HAVE A DRINK CONTAINING ALCOHOL?: MONTHLY OR LESS

## 2024-07-10 SDOH — SOCIAL STABILITY: SOCIAL NETWORK
DO YOU BELONG TO ANY CLUBS OR ORGANIZATIONS SUCH AS CHURCH GROUPS UNIONS, FRATERNAL OR ATHLETIC GROUPS, OR SCHOOL GROUPS?: NO

## 2024-07-10 SDOH — SOCIAL STABILITY: SOCIAL NETWORK
IN A TYPICAL WEEK, HOW MANY TIMES DO YOU TALK ON THE PHONE WITH FAMILY, FRIENDS, OR NEIGHBORS?: MORE THAN THREE TIMES A WEEK

## 2024-07-10 SDOH — SOCIAL STABILITY: SOCIAL NETWORK: HOW OFTEN DO YOU GET TOGETHER WITH FRIENDS OR RELATIVES?: MORE THAN THREE TIMES A WEEK

## 2024-07-10 SDOH — SOCIAL STABILITY: SOCIAL NETWORK: HOW OFTEN DO YOU ATTENT MEETINGS OF THE CLUB OR ORGANIZATION YOU BELONG TO?: NEVER

## 2024-07-10 SDOH — ECONOMIC STABILITY: HOUSING INSECURITY
IN THE LAST 12 MONTHS, WAS THERE A TIME WHEN YOU DID NOT HAVE A STEADY PLACE TO SLEEP OR SLEPT IN A SHELTER (INCLUDING NOW)?: NO

## 2024-07-10 SDOH — ECONOMIC STABILITY: TRANSPORTATION INSECURITY
IN THE PAST 12 MONTHS, HAS THE LACK OF TRANSPORTATION KEPT YOU FROM MEDICAL APPOINTMENTS OR FROM GETTING MEDICATIONS?: NO

## 2024-07-10 SDOH — HEALTH STABILITY: PHYSICAL HEALTH: ON AVERAGE, HOW MANY MINUTES DO YOU ENGAGE IN EXERCISE AT THIS LEVEL?: 30 MIN

## 2024-07-10 SDOH — ECONOMIC STABILITY: FOOD INSECURITY: WITHIN THE PAST 12 MONTHS, THE FOOD YOU BOUGHT JUST DIDN'T LAST AND YOU DIDN'T HAVE MONEY TO GET MORE.: NEVER TRUE

## 2024-07-10 SDOH — HEALTH STABILITY: PHYSICAL HEALTH: ON AVERAGE, HOW MANY DAYS PER WEEK DO YOU ENGAGE IN MODERATE TO STRENUOUS EXERCISE (LIKE A BRISK WALK)?: 5 DAYS

## 2024-07-10 SDOH — ECONOMIC STABILITY: TRANSPORTATION INSECURITY
IN THE PAST 12 MONTHS, HAS LACK OF TRANSPORTATION KEPT YOU FROM MEETINGS, WORK, OR FROM GETTING THINGS NEEDED FOR DAILY LIVING?: NO

## 2024-07-10 SDOH — ECONOMIC STABILITY: HOUSING INSECURITY: IN THE LAST 12 MONTHS, HOW MANY PLACES HAVE YOU LIVED?: 1

## 2024-07-10 SDOH — SOCIAL STABILITY: SOCIAL NETWORK: HOW OFTEN DO YOU ATTEND CHURCH OR RELIGIOUS SERVICES?: NEVER

## 2024-07-10 NOTE — CARE COORDINATION
Ambulatory Care Coordination Note     7/10/2024 11:37 AM     Patient Current Location:  Home: [address] [place] [id number]     ACM contacted the patient by telephone. Verified name and  with patient as identifiers.         ACM: Octavia Awad RN     Challenges to be reviewed by the provider   Additional needs identified to be addressed with provider No  none               Method of communication with provider: none.    Care Summary Note: ACM spoke to pt who states he is taking meds as prescribed now after pharmacy review.  ACM reviewed medications again over the phone with patient.  He is declining meeting in person at this time for further review.  Pt states he is still experiencing R knee swelling and pain post knee replacement.  Pt reports he is walking his dog daily in place of therapy.  Pt reports that he is struggling with daily exercise due to bilateral leg pain he describes as a burning pain that starts at his thighs and goes down both legs.  Pt is awaiting pain management to call him back for possible assistance with Dr. Jyothi Paz.  Pt aware that he can also seek ongoing assistance with Ortho.  Pt wanted to reduce statins to 40 vs 80mg due to statin side effects causing leg pain.  ACM discussed this with pt and pt states that leg pain had been going on prior to recent statin increase and is agreeable to remain on 80 mg dose and discuss other possible causes with pain management.  Pt has also recently been to psych.  Pt requesting assistance with billing workman's comp to psych and pain management.  Pt did reach out to his atty and has not heard back from them and is not aware of his WC CM information.  Ref made to  for assistance.  SDOH completed, no new needs identified.  Pt reports no longer smoking and only drinking occasionally.  He reports he does not drink alcohol at all if he has taken pain pills.      Offered patient enrollment in the Remote Patient Monitoring (RPM) program for

## 2024-07-15 ENCOUNTER — CARE COORDINATION (OUTPATIENT)
Dept: CARE COORDINATION | Age: 59
End: 2024-07-15

## 2024-07-15 NOTE — CARE COORDINATION
SW received referral from [facility] for assistance with having future medical  care covered under worker's compensation. SW placed call to patient to discuss. Per patient, he received prior knee surgery which was covered under worker's comp claim due to work injury in 2021. He has recently left a message for the same  who assisted him with that claim.   Patient reports he has a claim number and this worker encouraged him to provide it to office when he has an appointment. He will contact pain management clinic to schedule an appt with specialist and provide them with claim number.   No further questions or concerns at this time, will sign off.       Marielos Samuels MSW, LSW     [phone]

## 2024-07-25 ENCOUNTER — CARE COORDINATION (OUTPATIENT)
Dept: CARE COORDINATION | Age: 59
End: 2024-07-25

## 2024-07-25 NOTE — CARE COORDINATION
Ambulatory Care Coordination Note     7/25/2024 1:58 PM     Patient outreach attempt by this ACM today to perform care management follow up . ACM was unable to reach the patient by telephone today; left voice message requesting a return phone call to this ACM.     ACM: Octavia Awad RN         PCP/Specialist follow up:   Future Appointments         Provider Specialty Dept Phone    8/20/2024 10:30 AM Giovany Plasencia, PhD Psychology [phone]            Follow Up:   Plan for next AC outreach in approximately 3 weeks to complete:  - medication review  - advance care planning  - goal progression  - education   Does pt have new PCP? .

## 2024-08-05 ENCOUNTER — ANESTHESIA EVENT (OUTPATIENT)
Dept: POSTOP/PACU | Age: 59
End: 2024-08-05
Payer: COMMERCIAL

## 2024-08-06 ENCOUNTER — HOSPITAL ENCOUNTER (OUTPATIENT)
Dept: GENERAL RADIOLOGY | Age: 59
Discharge: HOME | End: 2024-08-06
Payer: COMMERCIAL

## 2024-08-06 ENCOUNTER — HOSPITAL ENCOUNTER (OUTPATIENT)
Dept: POSTOP/PACU | Age: 59
Discharge: HOME | End: 2024-08-06
Payer: COMMERCIAL

## 2024-08-06 ENCOUNTER — ANESTHESIA (OUTPATIENT)
Dept: POSTOP/PACU | Age: 59
End: 2024-08-06
Payer: COMMERCIAL

## 2024-08-06 VITALS
DIASTOLIC BLOOD PRESSURE: 82 MMHG | WEIGHT: 241.6 LBS | BODY MASS INDEX: 32.72 KG/M2 | HEIGHT: 72 IN | SYSTOLIC BLOOD PRESSURE: 118 MMHG | OXYGEN SATURATION: 99 % | TEMPERATURE: 97.8 F | HEART RATE: 62 BPM | RESPIRATION RATE: 16 BRPM

## 2024-08-06 DIAGNOSIS — G89.29 CHRONIC PAIN OF RIGHT KNEE: ICD-10-CM

## 2024-08-06 DIAGNOSIS — M25.561 CHRONIC PAIN OF RIGHT KNEE: ICD-10-CM

## 2024-08-06 LAB
APPEARANCE FLUID: NORMAL
BDY FLUID QUALITY: NORMAL
CELL COUNT FLUID TYPE: NORMAL
COLOR FLUID: YELLOW
CRP SERPL-MCNC: 5.3 MG/L (ref 0–5.1)
CRYSTALS FLD MICRO: NORMAL
DEPRECATED RDW RBC AUTO: 14.9 % (ref 12.4–15.4)
ERYTHROCYTE [SEDIMENTATION RATE] IN BLOOD BY WESTERGREN METHOD: 13 MM/HR (ref 0–20)
HCT VFR BLD AUTO: 42.7 % (ref 40.5–52.5)
HGB BLD-MCNC: 14.2 G/DL (ref 13.5–17.5)
LYMPHOCYTES NFR FLD: 56 %
MCH RBC QN AUTO: 28.7 PG (ref 26–34)
MCHC RBC AUTO-ENTMCNC: 33.3 G/DL (ref 31–36)
MCV RBC AUTO: 86.2 FL (ref 80–100)
MONOCYTES NFR FLD: 27 %
NEUTROPHIL, FLUID: 16 %
NUC CELL # FLD: 900 /CUMM
PLATELET # BLD AUTO: 216 K/UL (ref 135–450)
PMV BLD AUTO: 8.5 FL (ref 5–10.5)
RBC # BLD AUTO: 4.96 M/UL (ref 4.2–5.9)
RBC FLUID: NORMAL /CUMM
SPECIMEN SOURCE FLD: NORMAL
TOTAL CELLS COUNTED FLD: 100
VARIANT LYMPHS NFR FLD: 1 %
WBC # BLD AUTO: 6 K/UL (ref 4–11)

## 2024-08-06 PROCEDURE — 85652 RBC SED RATE AUTOMATED: CPT

## 2024-08-06 PROCEDURE — 36415 COLL VENOUS BLD VENIPUNCTURE: CPT

## 2024-08-06 PROCEDURE — 89060 EXAM SYNOVIAL FLUID CRYSTALS: CPT

## 2024-08-06 PROCEDURE — 3700000000 HC ANESTHESIA ATTENDED CARE: Performed by: ANESTHESIOLOGY

## 2024-08-06 PROCEDURE — 73560 X-RAY EXAM OF KNEE 1 OR 2: CPT

## 2024-08-06 PROCEDURE — 6360000002 HC RX W HCPCS

## 2024-08-06 PROCEDURE — 87205 SMEAR GRAM STAIN: CPT

## 2024-08-06 PROCEDURE — 6360000002 HC RX W HCPCS: Performed by: ORTHOPAEDIC SURGERY

## 2024-08-06 PROCEDURE — 7100000000 HC PACU RECOVERY - FIRST 15 MIN: Performed by: ANESTHESIOLOGY

## 2024-08-06 PROCEDURE — 2580000003 HC RX 258: Performed by: ANESTHESIOLOGY

## 2024-08-06 PROCEDURE — 89051 BODY FLUID CELL COUNT: CPT

## 2024-08-06 PROCEDURE — 86140 C-REACTIVE PROTEIN: CPT

## 2024-08-06 PROCEDURE — 6360000002 HC RX W HCPCS: Performed by: ANESTHESIOLOGY

## 2024-08-06 PROCEDURE — 7100000011 HC PHASE II RECOVERY - ADDTL 15 MIN: Performed by: ANESTHESIOLOGY

## 2024-08-06 PROCEDURE — 85027 COMPLETE CBC AUTOMATED: CPT

## 2024-08-06 PROCEDURE — 64447 NJX AA&/STRD FEMORAL NRV IMG: CPT | Performed by: ANESTHESIOLOGY

## 2024-08-06 PROCEDURE — 7100000010 HC PHASE II RECOVERY - FIRST 15 MIN: Performed by: ANESTHESIOLOGY

## 2024-08-06 PROCEDURE — 7100000001 HC PACU RECOVERY - ADDTL 15 MIN: Performed by: ANESTHESIOLOGY

## 2024-08-06 PROCEDURE — 87070 CULTURE OTHR SPECIMN AEROBIC: CPT

## 2024-08-06 RX ORDER — MIDAZOLAM HYDROCHLORIDE 1 MG/ML
INJECTION INTRAMUSCULAR; INTRAVENOUS
Status: COMPLETED | OUTPATIENT
Start: 2024-08-06 | End: 2024-08-06

## 2024-08-06 RX ORDER — OXYCODONE HYDROCHLORIDE 5 MG/1
5 TABLET ORAL 2 TIMES DAILY PRN
Qty: 14 TABLET | Refills: 0 | Status: SHIPPED | OUTPATIENT
Start: 2024-08-06 | End: 2024-08-13

## 2024-08-06 RX ORDER — SODIUM CHLORIDE 0.9 % (FLUSH) 0.9 %
5-40 SYRINGE (ML) INJECTION EVERY 12 HOURS SCHEDULED
Status: DISCONTINUED | OUTPATIENT
Start: 2024-08-06 | End: 2024-08-07 | Stop reason: HOSPADM

## 2024-08-06 RX ORDER — FENTANYL CITRATE 50 UG/ML
25 INJECTION, SOLUTION INTRAMUSCULAR; INTRAVENOUS EVERY 5 MIN PRN
Status: DISCONTINUED | OUTPATIENT
Start: 2024-08-06 | End: 2024-08-07 | Stop reason: HOSPADM

## 2024-08-06 RX ORDER — PROPOFOL 10 MG/ML
INJECTION, EMULSION INTRAVENOUS PRN
Status: DISCONTINUED | OUTPATIENT
Start: 2024-08-06 | End: 2024-08-06 | Stop reason: SDUPTHER

## 2024-08-06 RX ORDER — TRIAMCINOLONE ACETONIDE 40 MG/ML
80 INJECTION, SUSPENSION INTRA-ARTICULAR; INTRAMUSCULAR ONCE
Status: COMPLETED | OUTPATIENT
Start: 2024-08-06 | End: 2024-08-06

## 2024-08-06 RX ORDER — SODIUM CHLORIDE 9 MG/ML
INJECTION, SOLUTION INTRAVENOUS PRN
Status: DISCONTINUED | OUTPATIENT
Start: 2024-08-06 | End: 2024-08-07 | Stop reason: HOSPADM

## 2024-08-06 RX ORDER — KETOROLAC TROMETHAMINE 30 MG/ML
INJECTION, SOLUTION INTRAMUSCULAR; INTRAVENOUS PRN
Status: DISCONTINUED | OUTPATIENT
Start: 2024-08-06 | End: 2024-08-06 | Stop reason: SDUPTHER

## 2024-08-06 RX ORDER — HYDROMORPHONE HYDROCHLORIDE 1 MG/ML
0.5 INJECTION, SOLUTION INTRAMUSCULAR; INTRAVENOUS; SUBCUTANEOUS EVERY 5 MIN PRN
Status: DISCONTINUED | OUTPATIENT
Start: 2024-08-06 | End: 2024-08-07 | Stop reason: HOSPADM

## 2024-08-06 RX ORDER — SODIUM CHLORIDE 0.9 % (FLUSH) 0.9 %
5-40 SYRINGE (ML) INJECTION PRN
Status: DISCONTINUED | OUTPATIENT
Start: 2024-08-06 | End: 2024-08-07 | Stop reason: HOSPADM

## 2024-08-06 RX ORDER — FENTANYL CITRATE 50 UG/ML
INJECTION, SOLUTION INTRAMUSCULAR; INTRAVENOUS
Status: COMPLETED | OUTPATIENT
Start: 2024-08-06 | End: 2024-08-06

## 2024-08-06 RX ORDER — LABETALOL HYDROCHLORIDE 5 MG/ML
10 INJECTION, SOLUTION INTRAVENOUS
Status: DISCONTINUED | OUTPATIENT
Start: 2024-08-06 | End: 2024-08-07 | Stop reason: HOSPADM

## 2024-08-06 RX ORDER — NALOXONE HYDROCHLORIDE 0.4 MG/ML
INJECTION, SOLUTION INTRAMUSCULAR; INTRAVENOUS; SUBCUTANEOUS PRN
Status: DISCONTINUED | OUTPATIENT
Start: 2024-08-06 | End: 2024-08-07 | Stop reason: HOSPADM

## 2024-08-06 RX ORDER — SODIUM CHLORIDE, SODIUM LACTATE, POTASSIUM CHLORIDE, CALCIUM CHLORIDE 600; 310; 30; 20 MG/100ML; MG/100ML; MG/100ML; MG/100ML
INJECTION, SOLUTION INTRAVENOUS CONTINUOUS
Status: DISCONTINUED | OUTPATIENT
Start: 2024-08-06 | End: 2024-08-07 | Stop reason: HOSPADM

## 2024-08-06 RX ORDER — PROCHLORPERAZINE EDISYLATE 5 MG/ML
5 INJECTION INTRAMUSCULAR; INTRAVENOUS
Status: DISCONTINUED | OUTPATIENT
Start: 2024-08-06 | End: 2024-08-07 | Stop reason: HOSPADM

## 2024-08-06 RX ORDER — IPRATROPIUM BROMIDE AND ALBUTEROL SULFATE 2.5; .5 MG/3ML; MG/3ML
1 SOLUTION RESPIRATORY (INHALATION)
Status: DISCONTINUED | OUTPATIENT
Start: 2024-08-06 | End: 2024-08-07 | Stop reason: HOSPADM

## 2024-08-06 RX ORDER — LIDOCAINE HCL/PF 100 MG/5ML
SYRINGE (ML) INJECTION PRN
Status: DISCONTINUED | OUTPATIENT
Start: 2024-08-06 | End: 2024-08-06 | Stop reason: SDUPTHER

## 2024-08-06 RX ORDER — ONDANSETRON 2 MG/ML
4 INJECTION INTRAMUSCULAR; INTRAVENOUS
Status: DISCONTINUED | OUTPATIENT
Start: 2024-08-06 | End: 2024-08-07 | Stop reason: HOSPADM

## 2024-08-06 RX ORDER — BUPIVACAINE HYDROCHLORIDE 2.5 MG/ML
30 INJECTION, SOLUTION EPIDURAL; INFILTRATION; INTRACAUDAL ONCE
Status: COMPLETED | OUTPATIENT
Start: 2024-08-06 | End: 2024-08-06

## 2024-08-06 RX ORDER — ROPIVACAINE HYDROCHLORIDE 5 MG/ML
INJECTION, SOLUTION EPIDURAL; INFILTRATION; PERINEURAL
Status: COMPLETED | OUTPATIENT
Start: 2024-08-06 | End: 2024-08-06

## 2024-08-06 RX ORDER — OXYCODONE HYDROCHLORIDE 5 MG/1
5 TABLET ORAL 2 TIMES DAILY PRN
Qty: 14 TABLET | Refills: 0 | OUTPATIENT
Start: 2024-08-06 | End: 2024-08-06

## 2024-08-06 RX ORDER — LIDOCAINE HYDROCHLORIDE 10 MG/ML
5 INJECTION, SOLUTION INFILTRATION; PERINEURAL ONCE
Status: DISCONTINUED | OUTPATIENT
Start: 2024-08-06 | End: 2024-08-07 | Stop reason: HOSPADM

## 2024-08-06 RX ORDER — ACETAMINOPHEN 325 MG/1
650 TABLET ORAL
Status: DISCONTINUED | OUTPATIENT
Start: 2024-08-06 | End: 2024-08-07 | Stop reason: HOSPADM

## 2024-08-06 RX ADMIN — PROPOFOL 50 MG: 10 INJECTION, EMULSION INTRAVENOUS at 12:15

## 2024-08-06 RX ADMIN — MIDAZOLAM HYDROCHLORIDE 2 MG: 2 INJECTION, SOLUTION INTRAMUSCULAR; INTRAVENOUS at 11:52

## 2024-08-06 RX ADMIN — PROPOFOL 50 MG: 10 INJECTION, EMULSION INTRAVENOUS at 12:16

## 2024-08-06 RX ADMIN — Medication 100 MG: at 12:15

## 2024-08-06 RX ADMIN — PROPOFOL 25 MG: 10 INJECTION, EMULSION INTRAVENOUS at 12:17

## 2024-08-06 RX ADMIN — ROPIVACAINE HYDROCHLORIDE 30 ML: 5 INJECTION, SOLUTION EPIDURAL; INFILTRATION; PERINEURAL at 11:52

## 2024-08-06 RX ADMIN — SODIUM CHLORIDE, POTASSIUM CHLORIDE, SODIUM LACTATE AND CALCIUM CHLORIDE: 600; 310; 30; 20 INJECTION, SOLUTION INTRAVENOUS at 11:12

## 2024-08-06 RX ADMIN — BUPIVACAINE HYDROCHLORIDE 75 MG: 2.5 INJECTION, SOLUTION EPIDURAL; INFILTRATION; INTRACAUDAL; PERINEURAL at 12:15

## 2024-08-06 RX ADMIN — PROPOFOL 25 MG: 10 INJECTION, EMULSION INTRAVENOUS at 12:19

## 2024-08-06 RX ADMIN — TRIAMCINOLONE ACETONIDE 80 MG: 40 INJECTION, SUSPENSION INTRA-ARTICULAR; INTRAMUSCULAR at 12:15

## 2024-08-06 RX ADMIN — FENTANYL CITRATE 100 MCG: 50 INJECTION, SOLUTION INTRAMUSCULAR; INTRAVENOUS at 11:52

## 2024-08-06 RX ADMIN — KETOROLAC TROMETHAMINE 30 MG: 30 INJECTION, SOLUTION INTRAMUSCULAR; INTRAVENOUS at 12:15

## 2024-08-06 ASSESSMENT — PAIN DESCRIPTION - DESCRIPTORS
DESCRIPTORS: ACHING;SHARP;SORE
DESCRIPTORS: ACHING

## 2024-08-06 ASSESSMENT — PAIN - FUNCTIONAL ASSESSMENT: PAIN_FUNCTIONAL_ASSESSMENT: 0-10

## 2024-08-06 ASSESSMENT — PAIN SCALES - GENERAL
PAINLEVEL_OUTOF10: 0
PAINLEVEL_OUTOF10: 6
PAINLEVEL_OUTOF10: 3
PAINLEVEL_OUTOF10: 0

## 2024-08-06 ASSESSMENT — PAIN DESCRIPTION - ORIENTATION: ORIENTATION: RIGHT

## 2024-08-06 ASSESSMENT — PAIN DESCRIPTION - LOCATION: LOCATION: KNEE

## 2024-08-06 ASSESSMENT — PAIN DESCRIPTION - PAIN TYPE: TYPE: SURGICAL PAIN

## 2024-08-06 NOTE — PROGRESS NOTES
PACU Transfer to [facility]    Vitals:    08/06/24 1300   BP: 136/91   Pulse: 65   Resp: 17   Temp: 97.5 °F    SpO2: 99%         Intake/Output Summary (Last 24 hours) at 8/6/2024 1438  Last data filed at 8/6/2024 1300  Gross per 24 hour   Intake 790 ml   Output --   Net 790 ml       Pain assessment:  present - adequately treated  Pain Level: 0    Patient transferred to care of [facility] RN.    8/6/2024 1745      Alar Island Pedicle Flap Text: The defect edges were debeveled with a #15 scalpel blade.  Given the location of the defect, shape of the defect and the proximity to the alar rim an island pedicle advancement flap was deemed most appropriate.  Using a sterile surgical marker, an appropriate advancement flap was drawn incorporating the defect, outlining the appropriate donor tissue and placing the expected incisions within the nasal ala running parallel to the alar rim. The area thus outlined was incised with a #15 scalpel blade.  The skin margins were undermined minimally to an appropriate distance in all directions around the primary defect and laterally outward around the island pedicle utilizing iris scissors.  There was minimal undermining beneath the pedicle flap.

## 2024-08-06 NOTE — PROGRESS NOTES
PACU Transfer Note    Vitals:    08/06/24 1300   BP: (!) 136/91   Pulse: 65   Resp: 17   Temp: 97.5 °F (36.4 °C)   SpO2:        In: 790 [P.O.:140; I.V.:650]  Out: -     Pain assessment:  present - adequately treated  Pain Level: 3    Report given to Receiving unit RN.    8/6/2024 1:07 PM

## 2024-08-06 NOTE — H&P
Update History & Physical    The patient's History and Physical of August 5, 2024 was reviewed with the patient and I examined the patient. There was no change. The surgical site was confirmed by the patient and me.     Plan: The risks, benefits, expected outcome, and alternative to the recommended procedure have been discussed with the patient. Patient understands and wants to proceed with the procedure.     Electronically signed by Brandon Garcia MD on 8/6/2024 at 12:06 PM

## 2024-08-06 NOTE — PROGRESS NOTES
Procedure: peripheral block RIGHT knee  MD: Dr. Barrow  Timeout performed 1149  Pt monitored closely on heart monitor, 2L NC, continuous pulse oximetry, EtCO2, and frequent BPs.   Pt remained alert and oriented x4. pt tolerated procedure well.

## 2024-08-06 NOTE — DISCHARGE INSTRUCTIONS
(may be relieved by sitting up in a chair or recliner)   Hoarse voice   Blurry vision   Unequal pupils   Drooping of your face (eye or lip) on the same side as the nerve block   Swelling at the injection site on the side of your neck  These side effects should resolve as the block wears off   IF you have severe or prolonged shortness of breath- GO to the nearest Emergency Room  If you had a nerve block of your arm or leg:   Protect the arm or leg from extreme hot or cold temperatures   Protect the arm or leg from obstructing blood flow by frequent position changes- Make sure fingers/ toes stay pink and warm. Call surgeon with any changes   For leg block, get assistance walking until the block wears off, i.e.:  crutches, walker, support person    If you continue to feel the effects of the nerve block for longer than 72 hours- call [facility] at [phone] and ask to speak with the Anesthesiologist on call.

## 2024-08-06 NOTE — PROGRESS NOTES
1223 hand off form anesthesia complete no SS or pain or nausea from patient sleeping eyes closed Right Knee manipulation under anesthesia

## 2024-08-06 NOTE — ANESTHESIA PRE PROCEDURE
Department of Anesthesiology  Preprocedure Note       Name:  Brigido Cottrell   Age:  59 y.o.  :  1965                                          MRN:  [id number]         Date:  2024      Surgeon: * No surgeons listed *    Procedure: * No procedures listed *    Medications prior to admission:   Prior to Admission medications    Medication Sig Start Date End Date Taking? Authorizing Provider   pregabalin (LYRICA) 75 MG capsule Take 1 capsule by mouth 2 times daily. 5/10/24   Kamila Newman MD   oxyCODONE (ROXICODONE) 5 MG immediate release tablet Take 1 tablet by mouth 2 times daily as needed for Pain for up to 7 days. Max Daily Amount: 10 mg 24  Christine Montejo MD   gabapentin (NEURONTIN) 300 MG capsule Take 1 capsule by mouth 3 times daily for 30 days. 24  Christine Montejo MD   ibuprofen (ADVIL;MOTRIN) 800 MG tablet Take 1 tablet by mouth every 8 hours as needed for Pain 24   Christine Montejo MD   rOPINIRole (REQUIP) 0.5 MG tablet Take 1 tablet by mouth 3 times daily  Patient taking differently: Take 1 tablet by mouth as needed 24   Christine Montejo MD   lisinopril (PRINIVIL;ZESTRIL) 20 MG tablet TAKE 1 TABLET BY MOUTH DAILY 24   Raúl Ortez APRN - CNP   dilTIAZem (CARDIZEM CD) 120 MG extended release capsule Take 1 capsule by mouth daily    Kamila Newman MD   hydroCHLOROthiazide (HYDRODIURIL) 25 MG tablet Take 1 tablet by mouth daily    Kamila Newman MD   albuterol sulfate HFA (VENTOLIN HFA) 108 (90 Base) MCG/ACT inhaler Inhale 2 puffs into the lungs 4 times daily as needed for Wheezing 24   Oleksandr Meier MD   vitamin D (ERGOCALCIFEROL) 1.25 MG (32771 UT) CAPS capsule Take 1 capsule by mouth once a week 24   Raúl Ortez APRN - CNP   fluticasone (FLONASE) 50 MCG/ACT nasal spray 2 sprays by Each Nostril route daily  Patient taking differently: 2 sprays by Each Nostril route as needed 9/15/23   Neelima

## 2024-08-06 NOTE — PROGRESS NOTES
Patient admitted to PACU bed #  21   from [facility] @ 1206 in preperation for  RIGHTKNEE   manipulation per Dr. HERNANDEZ.  Patient attached to PACU monitoring system.  Patient, procedure and site confirmed with Dr. SANCHES and anesthesia provider at bedside.  Manipulation began at 1216 and ended at 1221.  Pre-procedure flexion AND  post-procedure flexion MEASUREMENT  Anesthesia monitored VS's and maintained airway during procedure. Anesthesia start 1210 end time and hand off time 1223

## 2024-08-06 NOTE — PROGRESS NOTES
Ambulatory Surgery/Procedure Discharge Note    Vitals:    08/06/24 1312   BP: 118/82   Pulse: 62   Resp: 16   Temp: 97.8 °F (36.6 °C)   SpO2: 99%       In: 790 [P.O.:140; I.V.:650]  Out: -     Restroom use offered before discharge.  Yes    Pain assessment:  level of pain (1-10, 10 severe),   Pain Level: 0    Patient given and fitted with a gait belt on discharge.  Patient and family stated they felt comfortable going home.  Patient stated he only has pain when he bears full weight on right knee.      Patient discharged to home/self care. Patient discharged via wheel chair by transporter to waiting family/S.O.       8/6/2024 2:47 PM

## 2024-08-06 NOTE — PROGRESS NOTES
Spoke with Dr. Garcia, received order for Xray of the knee.  and Patient taken to X-Ray via wheel chair

## 2024-08-06 NOTE — PROGRESS NOTES
Patients IV removed, patient alert and oriented X 3 with patients wife Noemi at the bedside.  Patient stated that wife could help get him dressed.  Patient called out to nursing staff, this nurse responded to patients call and found him on the floor on his back.  Patient and wife stated that he \"came into extreme pain when he stood on his right knee and came to the ground\"  Patient and family stated patient did not hit his head and that he did a slow fall to the ground, patient having no complaints of pain other than pain in right knee as a 6 when he bears weight on it.  Patient sitting in chair with no complaints at this time.  Call placed to Dr. Garcia and Manager Benedict.

## 2024-08-06 NOTE — OP NOTE
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     ESTIMATED BLOOD LOSS: 0cc    HISTORY OF PRESENT ILLNESS: The patient is a 59 y.o. male who developed knee arthrofibrosis after prior surgery with unacceptable ROM despite diligent physical therapy. The patient was consented for manipulation under anesthesia, and aspiration and possible injection was also to be considered. All questions were answered.     The patient was seen in the pre-operative holding area, the operative extremity was marked.      RISKS OF SURGERY:  I had an extensive conversation with the patient and/or family regarding the risks, benefits, potential complication and reasonable expectations of the planned procedure.   Informed verbal consent was given under no distress.  We had ample opportunities for questions regarding the usual outcomes.  Risks specifically discussed, but not limited to the following, include possible: return of stiffness, fracture, damage to internal structures, need for additional surger. In addition, in rare cases the patient may have loss of a limb or even death in very rare cases.  No guarantee of any particular results were given. The patient understands that in some cases  MSW attempted to reach patient at [phone] to follow-up about [facility] application  No answer  MSW left a message requesting callback with this writer's direct callback number  Awaiting same

## 2024-08-06 NOTE — ANESTHESIA PROCEDURE NOTES
Peripheral Block    Patient location during procedure: pre-op  Reason for block: post-op pain management and at surgeon's request  Start time: 8/6/2024 11:52 AM  End time: 8/6/2024 11:55 AM  Staffing  Performed: anesthesiologist   Anesthesiologist: Johnnie Barrow MD  Performed by: Johnnie Barrow MD  Authorized by: Johnnie Barrow MD    Preanesthetic Checklist  Completed: patient identified, IV checked, site marked, risks and benefits discussed, surgical/procedural consents, equipment checked, pre-op evaluation, timeout performed, anesthesia consent given, oxygen available, monitors applied/VS acknowledged, fire risk safety assessment completed and verbalized and blood product R/B/A discussed and consented  Peripheral Block   Patient position: supine  Prep: ChloraPrep  Provider prep: mask and sterile gloves  Patient monitoring: cardiac monitor, continuous pulse ox, continuous capnometry, frequent blood pressure checks, IV access, oxygen and responsive to questions  Block type: Femoral  Adductor canal  Laterality: right  Injection technique: single-shot  Guidance: ultrasound guided    Needle   Needle type: insulated echogenic nerve stimulator needle   Needle gauge: 22 G  Needle localization: ultrasound guidance  Needle length: 8 cm  Assessment   Injection assessment: negative aspiration for heme, no paresthesia on injection, local visualized surrounding nerve on ultrasound and no intravascular symptoms  Paresthesia pain: none  Slow fractionated injection: yes  Hemodynamics: stable  Outcomes: uncomplicated and patient tolerated procedure well    Additional Notes  0.5% ropivacaine 30 ml injected in 5 ml increments with negative aspiration between. No complications.  Medications Administered  ropivacaine (NAROPIN) injection 0.5% - Perineural   30 mL - 8/6/2024 11:52:00 AM  fentaNYL (SUBLIMAZE) injection - IntraVENous   100 mcg - 8/6/2024 11:52:00 AM  midazolam (VERSED) injection 2 mg/2mL - IntraVENous   2 mg -

## 2024-08-06 NOTE — ANESTHESIA POSTPROCEDURE EVALUATION
Department of Anesthesiology  Postprocedure Note    Patient: Brigido Cottrell  MRN: [id number]  YOB: 1965  Date of evaluation: 8/6/2024    Procedure Summary       Date: 08/06/24 Room / Location: [facility] PACU    Anesthesia Start: 1210 Anesthesia Stop: 1223    Procedure: JOINT MANIPULATION Diagnosis:     Scheduled Providers: Johnnie Barrow MD Responsible Provider: Johnnie Barrow MD    Anesthesia Type: MAC ASA Status: 2            Anesthesia Type: No value filed.    Eladio Phase I: Eladio Score: 10    Eladio Phase II:      Anesthesia Post Evaluation    Patient location during evaluation: PACU  Patient participation: complete - patient participated  Level of consciousness: awake  Pain score: 0  Nausea & Vomiting: no nausea and no vomiting  Cardiovascular status: blood pressure returned to baseline  Respiratory status: acceptable  Hydration status: euvolemic  Pain management: adequate    No notable events documented.

## 2024-08-09 LAB
BACTERIA FLD AEROBE CULT: NORMAL
GRAM STN SPEC: NORMAL

## 2024-08-12 LAB
BACTERIA FLD AEROBE CULT: NORMAL
GRAM STN SPEC: NORMAL

## 2024-08-15 ENCOUNTER — CARE COORDINATION (OUTPATIENT)
Dept: CARE COORDINATION | Age: 59
End: 2024-08-15

## 2024-08-19 LAB
BACTERIA FLD AEROBE CULT: NORMAL
GRAM STN SPEC: NORMAL

## 2024-08-27 ENCOUNTER — OFFICE VISIT (OUTPATIENT)
Dept: INTERNAL MEDICINE CLINIC | Age: 59
End: 2024-08-27

## 2024-08-27 VITALS
OXYGEN SATURATION: 96 % | SYSTOLIC BLOOD PRESSURE: 120 MMHG | HEART RATE: 83 BPM | HEIGHT: 72 IN | DIASTOLIC BLOOD PRESSURE: 88 MMHG | WEIGHT: 232 LBS | BODY MASS INDEX: 31.42 KG/M2

## 2024-08-27 DIAGNOSIS — G47.33 OSA (OBSTRUCTIVE SLEEP APNEA): ICD-10-CM

## 2024-08-27 DIAGNOSIS — G89.29 CHRONIC PAIN OF RIGHT KNEE: ICD-10-CM

## 2024-08-27 DIAGNOSIS — I10 PRIMARY HYPERTENSION: Primary | ICD-10-CM

## 2024-08-27 DIAGNOSIS — G62.9 NEUROPATHY: ICD-10-CM

## 2024-08-27 DIAGNOSIS — G25.81 RLS (RESTLESS LEGS SYNDROME): ICD-10-CM

## 2024-08-27 DIAGNOSIS — I48.0 PAROXYSMAL ATRIAL FIBRILLATION (HCC): ICD-10-CM

## 2024-08-27 DIAGNOSIS — E78.2 MIXED HYPERLIPIDEMIA: ICD-10-CM

## 2024-08-27 DIAGNOSIS — M25.561 CHRONIC PAIN OF RIGHT KNEE: ICD-10-CM

## 2024-08-27 DIAGNOSIS — R41.3 MEMORY LOSS: ICD-10-CM

## 2024-08-27 LAB
BACTERIA FLD AEROBE CULT: ABNORMAL
GRAM STN SPEC: ABNORMAL
ORGANISM: ABNORMAL

## 2024-08-27 RX ORDER — DILTIAZEM HYDROCHLORIDE 120 MG/1
120 CAPSULE, COATED, EXTENDED RELEASE ORAL DAILY
Qty: 30 CAPSULE | Status: CANCELLED | OUTPATIENT
Start: 2024-08-27

## 2024-08-27 RX ORDER — PREGABALIN 75 MG/1
75 CAPSULE ORAL 2 TIMES DAILY
Qty: 60 CAPSULE | Status: CANCELLED | OUTPATIENT
Start: 2024-08-27

## 2024-08-27 RX ORDER — ALBUTEROL SULFATE 90 UG/1
2 AEROSOL, METERED RESPIRATORY (INHALATION) 4 TIMES DAILY PRN
Qty: 18 G | Refills: 0 | Status: SHIPPED | OUTPATIENT
Start: 2024-08-27

## 2024-08-27 RX ORDER — GABAPENTIN 300 MG/1
300 CAPSULE ORAL 3 TIMES DAILY PRN
Qty: 90 CAPSULE | Refills: 0
Start: 2024-08-27 | End: 2024-11-25

## 2024-08-27 RX ORDER — ATORVASTATIN CALCIUM 80 MG/1
40 TABLET, FILM COATED ORAL DAILY
COMMUNITY
Start: 2024-07-26

## 2024-08-27 ASSESSMENT — ENCOUNTER SYMPTOMS
EYES NEGATIVE: 1
RESPIRATORY NEGATIVE: 1
ALLERGIC/IMMUNOLOGIC NEGATIVE: 1
ABDOMINAL PAIN: 0
EYE DISCHARGE: 0
GASTROINTESTINAL NEGATIVE: 1
SHORTNESS OF BREATH: 0

## 2024-08-27 NOTE — PROGRESS NOTES
Brigido Cottrell (:  1965) is a 59 y.o. male,New patient, here for evaluation of the following chief complaint(s):  New Patient      Past medical history significant for HTN, chronic right knee pain, RLS, ARIADNE, atrial fibrillation status post ablation, nasal polyp    RLS- ropinirole as needed    HTN- lisinopril, , diltiazem and HCTZ      s/p right Total knee arthroplasty 24, s/p right knee manipulation under anesthesia and injection on 24     Takes gabapentin- neuropathy in right foot. Reports takes only as needed.    Atrial fibrillation- s/p ablation.    Family h/o heart attack in father and mother.    H.p cervical pain- Cervical Radiofrequency Ablation - 2024. [facility].  He  aspirin Lipitor few days ago from his cardiologist. [id number] Score 1 (HTN)     intermittent short memory loss- saw neurologist. Avoid hypnotics, need to see sleep specialist.    A-fib status post ablation. On aspirin and statin     H/o nasal polyp.     When driving forgets where going to   Assessment & Plan  Primary hypertension   Well-controlled, continue current medications continue with his lisinopril, hydrochlorothiazide and diltiazem.         Mixed hyperlipidemia   Well-controlled, patient has been taking half tablet, that is 40 mg daily instead of 80 mg as a developed muscle pain while being on medicine.  Repeat labs ordered.    Orders:    Lipid, Fasting; Future    Lipid, Fasting    ARIADNE (obstructive sleep apnea)    Patient suspected to have ARIADNE, sleep study needed, referrals made.    Orders:    Laura Sow MD, Sleep Medicine, [facility]    Memory loss    Patient reports that his memory is worsening, forgets to return back home.  He has seen neurologist, recommended to schedule an appointment to follow-up with neurologist.         Chronic pain of right knee    Uncontrolled, use of gabapentin.  Recommended to use ibuprofen as needed.  Needs to see pain management for

## 2024-08-27 NOTE — ASSESSMENT & PLAN NOTE
Well-controlled, continue current medications continue with his lisinopril, hydrochlorothiazide and diltiazem.

## 2024-08-27 NOTE — ASSESSMENT & PLAN NOTE
Well-controlled, patient has been taking half tablet, that is 40 mg daily instead of 80 mg as a developed muscle pain while being on medicine.  Repeat labs ordered.    Orders:    Lipid, Fasting; Future    Lipid, Fasting

## 2024-08-28 LAB
CHOLEST SERPL-MCNC: 182 MG/DL (ref 0–199)
HDLC SERPL-MCNC: 37 MG/DL (ref 40–60)
LDL CHOLESTEROL: 118 MG/DL
TRIGL SERPL-MCNC: 134 MG/DL (ref 0–150)
VLDLC SERPL CALC-MCNC: 27 MG/DL

## 2024-09-04 NOTE — TELEPHONE ENCOUNTER
Future Appointments   Date Time Provider Department Center   9/25/2024  1:30 PM Giovany Plasencia, PhD [place] [facility]       Last appt 8/27/24

## 2024-09-05 RX ORDER — DILTIAZEM HYDROCHLORIDE 120 MG/1
120 CAPSULE, COATED, EXTENDED RELEASE ORAL DAILY
Qty: 90 CAPSULE | Refills: 1 | Status: SHIPPED | OUTPATIENT
Start: 2024-09-05 | End: 2025-03-04

## 2024-09-05 RX ORDER — HYDROCHLOROTHIAZIDE 25 MG/1
25 TABLET ORAL DAILY
Qty: 90 TABLET | Refills: 1 | Status: SHIPPED | OUTPATIENT
Start: 2024-09-05 | End: 2025-03-04

## 2024-09-25 ENCOUNTER — OFFICE VISIT (OUTPATIENT)
Dept: PSYCHOLOGY | Age: 59
End: 2024-09-25
Payer: COMMERCIAL

## 2024-09-25 DIAGNOSIS — F43.23 ADJUSTMENT REACTION WITH ANXIETY AND DEPRESSION: Primary | ICD-10-CM

## 2024-09-25 PROCEDURE — 1036F TOBACCO NON-USER: CPT | Performed by: PSYCHOLOGIST

## 2024-09-25 PROCEDURE — 90832 PSYTX W PT 30 MINUTES: CPT | Performed by: PSYCHOLOGIST

## 2024-09-25 ASSESSMENT — PATIENT HEALTH QUESTIONNAIRE - PHQ9
4. FEELING TIRED OR HAVING LITTLE ENERGY: MORE THAN HALF THE DAYS
1. LITTLE INTEREST OR PLEASURE IN DOING THINGS: MORE THAN HALF THE DAYS
6. FEELING BAD ABOUT YOURSELF - OR THAT YOU ARE A FAILURE OR HAVE LET YOURSELF OR YOUR FAMILY DOWN: SEVERAL DAYS
8. MOVING OR SPEAKING SO SLOWLY THAT OTHER PEOPLE COULD HAVE NOTICED. OR THE OPPOSITE, BEING SO FIGETY OR RESTLESS THAT YOU HAVE BEEN MOVING AROUND A LOT MORE THAN USUAL: NOT AT ALL
SUM OF ALL RESPONSES TO PHQ QUESTIONS 1-9: 11
3. TROUBLE FALLING OR STAYING ASLEEP: MORE THAN HALF THE DAYS
9. THOUGHTS THAT YOU WOULD BE BETTER OFF DEAD, OR OF HURTING YOURSELF: NOT AT ALL
SUM OF ALL RESPONSES TO PHQ QUESTIONS 1-9: 11
SUM OF ALL RESPONSES TO PHQ9 QUESTIONS 1 & 2: 4
7. TROUBLE CONCENTRATING ON THINGS, SUCH AS READING THE NEWSPAPER OR WATCHING TELEVISION: MORE THAN HALF THE DAYS
2. FEELING DOWN, DEPRESSED OR HOPELESS: MORE THAN HALF THE DAYS
5. POOR APPETITE OR OVEREATING: NOT AT ALL

## 2024-09-25 ASSESSMENT — ANXIETY QUESTIONNAIRES
3. WORRYING TOO MUCH ABOUT DIFFERENT THINGS: 2-OVER HALF THE DAYS
5. BEING SO RESTLESS THAT IT IS HARD TO SIT STILL: 1-SEVERAL DAYS
1. FEELING NERVOUS, ANXIOUS, OR ON EDGE: SEVERAL DAYS
2. NOT BEING ABLE TO STOP OR CONTROL WORRYING: 2-OVER HALF THE DAYS
4. TROUBLE RELAXING: 1-SEVERAL DAYS
GAD7 TOTAL SCORE: 10
6. BECOMING EASILY ANNOYED OR IRRITABLE: 2-OVER HALF THE DAYS

## 2024-09-25 NOTE — PROGRESS NOTES
Behavioral Health Consultation  Giovany Plasencia, Ph.D.  Psychologist  9/25/2024     1:30 PM EDT      Time spent with Patient: 30 minutes  This is patient's third [facility] appointment.    Reason for Consult: anxiety  Referring Provider: Paloma Huizar MD  [address]  [facility] [address]    Feedback for PCP:     Pt provided informed consent for the behavioral health program. Discussed with patient model of service to include the limits of confidentiality (i.e. abuse reporting, suicide intervention, etc.) and short-term intervention focused approach.  Pt indicated understanding.  Feedback given to PCP.    S:  Patient presented to his PCP on 8/27/2014 with concerns about \"intermittent short term memory loss.\" He reported at that time that he has seen a neurologist. He said that when he's driving, he will sometimes forget where he is going to. He said that he thinks his memory is getting worse, that sometimes he forgets to return home. His problem list includes PTSD but he did not reference trauma.     Patient said, \"the car is grounded,\" as he feels his memory and confusion issues are worsening. He said that he has PTSD, anxiety, and frustration in his history, but a review of previous encounters suggests that his cognition and memory has not been presented as a concern. He is worried and concerned, but seems equally concerned about his brother who he says is a homeless vet, that has been living in some kind of container for years.     Patient has some anxiety, but reports most worry is about his brother. He reports some mild depressive symptoms, like feeling down, having little interest in doing things he used to enjoy, not having much energy most days, and some difficulty with sustained concentration. He was present, conversational, and easy to talk to. He is  and lives alone and is not currently working.     Caffeine: limited  Sleep: is poor and a sleep study has been recommended  Exercise: no

## 2024-10-23 ENCOUNTER — HOSPITAL ENCOUNTER (OUTPATIENT)
Dept: GENERAL RADIOLOGY | Age: 59
Discharge: HOME | End: 2024-10-23
Payer: COMMERCIAL

## 2024-10-23 ENCOUNTER — HOSPITAL ENCOUNTER (OUTPATIENT)
Age: 59
Discharge: HOME | End: 2024-10-23
Payer: COMMERCIAL

## 2024-10-23 ENCOUNTER — OFFICE VISIT (OUTPATIENT)
Age: 59
End: 2024-10-23

## 2024-10-23 VITALS
DIASTOLIC BLOOD PRESSURE: 84 MMHG | HEART RATE: 79 BPM | BODY MASS INDEX: 31.6 KG/M2 | SYSTOLIC BLOOD PRESSURE: 122 MMHG | OXYGEN SATURATION: 94 % | TEMPERATURE: 99.3 F | WEIGHT: 233 LBS

## 2024-10-23 DIAGNOSIS — R06.02 SOB (SHORTNESS OF BREATH): ICD-10-CM

## 2024-10-23 DIAGNOSIS — R06.02 SOB (SHORTNESS OF BREATH): Primary | ICD-10-CM

## 2024-10-23 LAB
Lab: 0
PERFORMING INSTRUMENT: NORMAL
QC PASS/FAIL: 0
SARS-COV-2, POC: NORMAL

## 2024-10-23 PROCEDURE — 71046 X-RAY EXAM CHEST 2 VIEWS: CPT

## 2024-10-23 RX ORDER — AZITHROMYCIN 250 MG/1
TABLET, FILM COATED ORAL
Qty: 6 TABLET | Refills: 0 | Status: SHIPPED | OUTPATIENT
Start: 2024-10-23 | End: 2024-11-02

## 2024-10-23 RX ORDER — PREDNISONE 20 MG/1
20 TABLET ORAL DAILY
Qty: 5 TABLET | Refills: 0 | Status: SHIPPED | OUTPATIENT
Start: 2024-10-23 | End: 2024-10-28

## 2024-10-23 ASSESSMENT — ENCOUNTER SYMPTOMS
SHORTNESS OF BREATH: 1
COUGH: 1

## 2024-10-23 NOTE — PROGRESS NOTES
Brigido Cottrell (:  1965) is a 59 y.o. male,New patient, here for evaluation of the following chief complaint(s):  Shortness of Breath and Cough (Symptoms for five days.)      ASSESSMENT/PLAN:    Xray Result (most recent):  XR CHEST STANDARD TWO VW 10/23/2024    Narrative  EXAMINATION:  TWO XRAY VIEWS OF THE CHEST    10/23/2024 1:20 pm    COMPARISON:  None.    HISTORY:  ORDERING SYSTEM PROVIDED HISTORY: SOB (shortness of breath)  TECHNOLOGIST PROVIDED HISTORY:  Reason for Exam: sob    FINDINGS:  The lungs are without acute focal process.  There is no effusion or  pneumothorax. The cardiomediastinal silhouette is without acute process. The  osseous structures are without acute process.    Impression  No acute process.     Dx Diff:pneumonia, pleural effusion   Education and handout provided on diagnosis and management of symptoms.   AVS reviewed with patient. Follow up as needed in UC or with PCP for new or worsening symptoms.   Return if symptoms worsen or fail to improve.  Discussed concerns with examination findings and pt does not want to go to the ED did agree to get a CXR will order and will call with results but still may need to be sent to the ED pt and spouse aware    SUBJECTIVE/OBJECTIVE:  Patient presents today with complaints of cough and shortness of breath that started 5 days ago      History provided by:  Patient   used: No    Shortness of Breath    Cough  Associated symptoms include shortness of breath.       Vitals:    10/23/24 1242   BP: 122/84   Site: Left Upper Arm   Position: Sitting   Cuff Size: Medium Adult   Pulse: 79   Temp: 99.3 °F (37.4 °C)   TempSrc: Oral   SpO2: 94%   Weight: 105.7 kg (233 lb)       Review of Systems   Respiratory:  Positive for cough and shortness of breath.        Physical Exam  Constitutional:       Appearance: Normal appearance.   HENT:      Head: Normocephalic.      Nose: Nose normal.      Mouth/Throat:      Mouth: Mucous membranes are

## 2024-10-23 NOTE — PATIENT INSTRUCTIONS
Thank you for allowing us to care for you today and we hope you feel better soon  Will call with x ray results when available

## 2024-11-03 ENCOUNTER — HOSPITAL ENCOUNTER (EMERGENCY)
Age: 59
Discharge: HOME | End: 2024-11-03
Attending: STUDENT IN AN ORGANIZED HEALTH CARE EDUCATION/TRAINING PROGRAM
Payer: COMMERCIAL

## 2024-11-03 ENCOUNTER — APPOINTMENT (OUTPATIENT)
Dept: GENERAL RADIOLOGY | Age: 59
End: 2024-11-03
Payer: COMMERCIAL

## 2024-11-03 ENCOUNTER — APPOINTMENT (OUTPATIENT)
Dept: CT IMAGING | Age: 59
End: 2024-11-03
Payer: COMMERCIAL

## 2024-11-03 VITALS
DIASTOLIC BLOOD PRESSURE: 97 MMHG | RESPIRATION RATE: 21 BRPM | HEART RATE: 78 BPM | OXYGEN SATURATION: 97 % | TEMPERATURE: 98.7 F | WEIGHT: 225 LBS | BODY MASS INDEX: 30.52 KG/M2 | SYSTOLIC BLOOD PRESSURE: 151 MMHG

## 2024-11-03 DIAGNOSIS — J18.9 PNEUMONIA DUE TO INFECTIOUS ORGANISM, UNSPECIFIED LATERALITY, UNSPECIFIED PART OF LUNG: Primary | ICD-10-CM

## 2024-11-03 LAB
ANION GAP SERPL CALCULATED.3IONS-SCNC: 10 MMOL/L (ref 3–16)
BASOPHILS # BLD: 0.1 K/UL (ref 0–0.2)
BASOPHILS NFR BLD: 1 %
BUN SERPL-MCNC: 10 MG/DL (ref 7–20)
CALCIUM SERPL-MCNC: 9.5 MG/DL (ref 8.3–10.6)
CHLORIDE SERPL-SCNC: 104 MMOL/L (ref 99–110)
CO2 SERPL-SCNC: 25 MMOL/L (ref 21–32)
CREAT SERPL-MCNC: 0.8 MG/DL (ref 0.8–1.3)
DEPRECATED RDW RBC AUTO: 13.4 % (ref 12.4–15.4)
EOSINOPHIL # BLD: 0.9 K/UL (ref 0–0.6)
EOSINOPHIL NFR BLD: 11.6 %
FLUAV RNA UPPER RESP QL NAA+PROBE: NEGATIVE
FLUBV AG NPH QL: NEGATIVE
GFR SERPLBLD CREATININE-BSD FMLA CKD-EPI: >90 ML/MIN/{1.73_M2}
GLUCOSE SERPL-MCNC: 86 MG/DL (ref 70–99)
HCT VFR BLD AUTO: 45.1 % (ref 40.5–52.5)
HGB BLD-MCNC: 15.8 G/DL (ref 13.5–17.5)
LYMPHOCYTES # BLD: 1.5 K/UL (ref 1–5.1)
LYMPHOCYTES NFR BLD: 20.4 %
MAGNESIUM SERPL-MCNC: 2 MG/DL (ref 1.8–2.4)
MCH RBC QN AUTO: 29.2 PG (ref 26–34)
MCHC RBC AUTO-ENTMCNC: 34.9 G/DL (ref 31–36)
MCV RBC AUTO: 83.5 FL (ref 80–100)
MONOCYTES # BLD: 0.5 K/UL (ref 0–1.3)
MONOCYTES NFR BLD: 6.9 %
NEUTROPHILS # BLD: 4.5 K/UL (ref 1.7–7.7)
NEUTROPHILS NFR BLD: 60.1 %
NT-PROBNP SERPL-MCNC: 111 PG/ML (ref 0–124)
PLATELET # BLD AUTO: 281 K/UL (ref 135–450)
PMV BLD AUTO: 7.4 FL (ref 5–10.5)
POTASSIUM SERPL-SCNC: 4.4 MMOL/L (ref 3.5–5.1)
RBC # BLD AUTO: 5.4 M/UL (ref 4.2–5.9)
SARS-COV-2 RDRP RESP QL NAA+PROBE: NOT DETECTED
SODIUM SERPL-SCNC: 139 MMOL/L (ref 136–145)
TROPONIN, HIGH SENSITIVITY: 7 NG/L (ref 0–22)
TROPONIN, HIGH SENSITIVITY: <6 NG/L (ref 0–22)
WBC # BLD AUTO: 7.5 K/UL (ref 4–11)

## 2024-11-03 PROCEDURE — 71260 CT THORAX DX C+: CPT

## 2024-11-03 PROCEDURE — 80048 BASIC METABOLIC PNL TOTAL CA: CPT

## 2024-11-03 PROCEDURE — 6360000002 HC RX W HCPCS: Performed by: STUDENT IN AN ORGANIZED HEALTH CARE EDUCATION/TRAINING PROGRAM

## 2024-11-03 PROCEDURE — 84484 ASSAY OF TROPONIN QUANT: CPT

## 2024-11-03 PROCEDURE — 6360000004 HC RX CONTRAST MEDICATION: Performed by: STUDENT IN AN ORGANIZED HEALTH CARE EDUCATION/TRAINING PROGRAM

## 2024-11-03 PROCEDURE — 83880 ASSAY OF NATRIURETIC PEPTIDE: CPT

## 2024-11-03 PROCEDURE — 71045 X-RAY EXAM CHEST 1 VIEW: CPT

## 2024-11-03 PROCEDURE — 85025 COMPLETE CBC W/AUTO DIFF WBC: CPT

## 2024-11-03 PROCEDURE — 36415 COLL VENOUS BLD VENIPUNCTURE: CPT

## 2024-11-03 PROCEDURE — 83735 ASSAY OF MAGNESIUM: CPT

## 2024-11-03 PROCEDURE — 87635 SARS-COV-2 COVID-19 AMP PRB: CPT

## 2024-11-03 PROCEDURE — 87502 INFLUENZA DNA AMP PROBE: CPT

## 2024-11-03 PROCEDURE — 99285 EMERGENCY DEPT VISIT HI MDM: CPT

## 2024-11-03 PROCEDURE — 87804 INFLUENZA ASSAY W/OPTIC: CPT

## 2024-11-03 RX ORDER — DOXYCYCLINE HYCLATE 100 MG
100 TABLET ORAL 2 TIMES DAILY
Qty: 14 TABLET | Refills: 0 | Status: SHIPPED | OUTPATIENT
Start: 2024-11-03 | End: 2024-11-10

## 2024-11-03 RX ORDER — PREDNISONE 20 MG/1
40 TABLET ORAL DAILY
Qty: 10 TABLET | Refills: 0 | Status: SHIPPED | OUTPATIENT
Start: 2024-11-03 | End: 2024-11-08

## 2024-11-03 RX ORDER — ALBUTEROL SULFATE 90 UG/1
2 INHALANT RESPIRATORY (INHALATION) 4 TIMES DAILY PRN
Qty: 18 G | Refills: 0 | Status: SHIPPED | OUTPATIENT
Start: 2024-11-03

## 2024-11-03 RX ORDER — ALBUTEROL SULFATE 0.83 MG/ML
2.5 SOLUTION RESPIRATORY (INHALATION) ONCE
Status: COMPLETED | OUTPATIENT
Start: 2024-11-03 | End: 2024-11-03

## 2024-11-03 RX ADMIN — ALBUTEROL SULFATE 2.5 MG: 0.83 SOLUTION RESPIRATORY (INHALATION) at 10:47

## 2024-11-03 RX ADMIN — IPRATROPIUM BROMIDE 0.5 MG: 0.5 SOLUTION RESPIRATORY (INHALATION) at 10:47

## 2024-11-03 RX ADMIN — IOMEPROL INJECTION 100 ML: 714 INJECTION, SOLUTION INTRAVASCULAR at 12:30

## 2024-11-03 NOTE — ED PROVIDER NOTES
EMERGENCY DEPARTMENT ENCOUNTER      CHIEF COMPLAINT    Cough and Shortness of Breath (/)      HPI    Brigido Cottrell is a 59 y.o. male with past medical history significant for HLD, HTN, Covid who presents with cough and MARGY   Patient with several days of cough and difficulty breathing appears a progressive in nature  He tells me his symptoms were first noticed a week ago when he was diagnosed with potential pneumonia started on azithromycin but continues to have worsening of symptoms denies any obvious chest pain but does tell me he is having difficulty in breathing  He otherwise reports a history of smoking but has not smoked in over 40 years  Otherwise denies falls or trauma    PAST MEDICAL HISTORY    Past Medical History:   Diagnosis Date    Arthritis     COVID-19 March 2022, residual cough    Hyperlipidemia     Hypertension     Medial meniscus tear     Persistent atrial fibrillation (HCC) 09/25/2023    Pneumonia     Syncope and collapse        SURGICAL HISTORY    Past Surgical History:   Procedure Laterality Date    ABLATION OF DYSRHYTHMIC FOCUS  2022    for at fib    APPENDECTOMY      COLONOSCOPY      KNEE ARTHROSCOPY Right 11/02/2022    RIGHT KNEE ARTHROSCOPY WITH MEDIAL MENISCUS REPAIR performed by Brandon Garcia MD at [facility] OR    MANDIBLE SURGERY      SINUS ENDOSCOPY Right 10/03/2023    RESECTION OF RIGHT INTRANASAL MASS, LEFT NASAL ENDOSCOPY WITH BIOPSY performed by Wyatt Vu MD at [facility] OR    TOTAL KNEE ARTHROPLASTY Right 05/01/2024    RIGHT TOTAL KNEE ARTHROPLASTY performed by Brandon Garcia MD at [facility] OR       CURRENT MEDICATIONS    Current Outpatient Rx   Medication Sig Dispense Refill    amoxicillin-clavulanate (AUGMENTIN) 875-125 MG per tablet Take 1 tablet by mouth 2 times daily for 7 days 14 tablet 0    doxycycline hyclate (VIBRA-TABS) 100 MG tablet Take 1 tablet by mouth 2 times daily for 7 days 14 tablet 0    predniSONE (DELTASONE) 20 MG tablet Take 2 tablets by

## 2024-11-03 NOTE — ED NOTES
Patient DCed from ED at this time. Discussed AVS, follow up, and scripts. They verbalized understanding. Reinforced that should symptoms persist or worsen to return to the ED. They verbalized understanding. Patient ambulated out of ED. RN thanked patient for choosing [facility].

## 2024-11-03 NOTE — DISCHARGE INSTRUCTIONS
Today you are seen here in the emergency for cough.  Your CAT scan results to be seen below we do believe pneumonia.  Return for fevers, difficulty breathing and for new/concerning symptoms that chest pain.    CT CHEST PULMONARY EMBOLISM W CONTRAST   Final Result   1. Negative for pulmonary embolus.   2. Central airways disease with bibasilar mucous plugging.   3. Mild left basilar segmental atelectasis versus pneumonia.         XR CHEST PORTABLE   Final Result   No acute process.

## 2024-11-03 NOTE — ED NOTES
Ambulated patient with pulse ox. Patient's oxygen stayed at %98-%96 on RA. HR is 96-98. ED MD notified. Patient oxygen %98 on RA when hooked back up to cardiac monitor.

## 2024-11-16 ENCOUNTER — HOSPITAL ENCOUNTER (EMERGENCY)
Age: 59
Discharge: HOME | End: 2024-11-16
Attending: EMERGENCY MEDICINE
Payer: COMMERCIAL

## 2024-11-16 ENCOUNTER — APPOINTMENT (OUTPATIENT)
Dept: GENERAL RADIOLOGY | Age: 59
End: 2024-11-16
Payer: COMMERCIAL

## 2024-11-16 VITALS
WEIGHT: 228.84 LBS | BODY MASS INDEX: 31 KG/M2 | OXYGEN SATURATION: 95 % | HEIGHT: 72 IN | HEART RATE: 106 BPM | DIASTOLIC BLOOD PRESSURE: 58 MMHG | RESPIRATION RATE: 23 BRPM | SYSTOLIC BLOOD PRESSURE: 118 MMHG | TEMPERATURE: 97.7 F

## 2024-11-16 DIAGNOSIS — R06.2 WHEEZING: ICD-10-CM

## 2024-11-16 DIAGNOSIS — R05.1 ACUTE COUGH: Primary | ICD-10-CM

## 2024-11-16 LAB
ANION GAP SERPL CALCULATED.3IONS-SCNC: 17 MMOL/L (ref 3–16)
BASOPHILS # BLD: 0.1 K/UL (ref 0–0.2)
BASOPHILS NFR BLD: 0.7 %
BUN SERPL-MCNC: 12 MG/DL (ref 7–20)
CALCIUM SERPL-MCNC: 9.6 MG/DL (ref 8.3–10.6)
CHLORIDE SERPL-SCNC: 98 MMOL/L (ref 99–110)
CO2 SERPL-SCNC: 25 MMOL/L (ref 21–32)
CREAT SERPL-MCNC: 1 MG/DL (ref 0.8–1.3)
DEPRECATED RDW RBC AUTO: 14 % (ref 12.4–15.4)
EOSINOPHIL # BLD: 0.7 K/UL (ref 0–0.6)
EOSINOPHIL NFR BLD: 5.7 %
FLUAV RNA UPPER RESP QL NAA+PROBE: NEGATIVE
FLUBV AG NPH QL: NEGATIVE
GFR SERPLBLD CREATININE-BSD FMLA CKD-EPI: 86 ML/MIN/{1.73_M2}
GLUCOSE SERPL-MCNC: 90 MG/DL (ref 70–99)
HCT VFR BLD AUTO: 42.9 % (ref 40.5–52.5)
HGB BLD-MCNC: 15 G/DL (ref 13.5–17.5)
LYMPHOCYTES # BLD: 1.9 K/UL (ref 1–5.1)
LYMPHOCYTES NFR BLD: 15.9 %
MCH RBC QN AUTO: 29.6 PG (ref 26–34)
MCHC RBC AUTO-ENTMCNC: 34.9 G/DL (ref 31–36)
MCV RBC AUTO: 84.6 FL (ref 80–100)
MONOCYTES # BLD: 0.8 K/UL (ref 0–1.3)
MONOCYTES NFR BLD: 6.8 %
NEUTROPHILS # BLD: 8.6 K/UL (ref 1.7–7.7)
NEUTROPHILS NFR BLD: 70.9 %
NT-PROBNP SERPL-MCNC: <36 PG/ML (ref 0–124)
PLATELET # BLD AUTO: 226 K/UL (ref 135–450)
PMV BLD AUTO: 8.2 FL (ref 5–10.5)
POTASSIUM SERPL-SCNC: 3.6 MMOL/L (ref 3.5–5.1)
RBC # BLD AUTO: 5.08 M/UL (ref 4.2–5.9)
SARS-COV-2 RDRP RESP QL NAA+PROBE: NOT DETECTED
SODIUM SERPL-SCNC: 140 MMOL/L (ref 136–145)
TROPONIN, HIGH SENSITIVITY: 8 NG/L (ref 0–22)
WBC # BLD AUTO: 12.2 K/UL (ref 4–11)

## 2024-11-16 PROCEDURE — 84484 ASSAY OF TROPONIN QUANT: CPT

## 2024-11-16 PROCEDURE — 87635 SARS-COV-2 COVID-19 AMP PRB: CPT

## 2024-11-16 PROCEDURE — 83880 ASSAY OF NATRIURETIC PEPTIDE: CPT

## 2024-11-16 PROCEDURE — 71046 X-RAY EXAM CHEST 2 VIEWS: CPT

## 2024-11-16 PROCEDURE — 36415 COLL VENOUS BLD VENIPUNCTURE: CPT

## 2024-11-16 PROCEDURE — 6370000000 HC RX 637 (ALT 250 FOR IP): Performed by: EMERGENCY MEDICINE

## 2024-11-16 PROCEDURE — 80048 BASIC METABOLIC PNL TOTAL CA: CPT

## 2024-11-16 PROCEDURE — 6360000002 HC RX W HCPCS: Performed by: EMERGENCY MEDICINE

## 2024-11-16 PROCEDURE — 99285 EMERGENCY DEPT VISIT HI MDM: CPT

## 2024-11-16 PROCEDURE — 96374 THER/PROPH/DIAG INJ IV PUSH: CPT

## 2024-11-16 PROCEDURE — 96375 TX/PRO/DX INJ NEW DRUG ADDON: CPT

## 2024-11-16 PROCEDURE — 93005 ELECTROCARDIOGRAM TRACING: CPT | Performed by: EMERGENCY MEDICINE

## 2024-11-16 PROCEDURE — 85025 COMPLETE CBC W/AUTO DIFF WBC: CPT

## 2024-11-16 PROCEDURE — 87804 INFLUENZA ASSAY W/OPTIC: CPT

## 2024-11-16 RX ORDER — METHYLPREDNISOLONE SODIUM SUCCINATE 125 MG/2ML
125 INJECTION INTRAMUSCULAR; INTRAVENOUS ONCE
Status: COMPLETED | OUTPATIENT
Start: 2024-11-16 | End: 2024-11-16

## 2024-11-16 RX ORDER — AZITHROMYCIN 500 MG/1
500 TABLET, FILM COATED ORAL ONCE
Status: COMPLETED | OUTPATIENT
Start: 2024-11-16 | End: 2024-11-16

## 2024-11-16 RX ORDER — METHYLPREDNISOLONE SODIUM SUCCINATE 40 MG/ML
40 INJECTION INTRAMUSCULAR; INTRAVENOUS ONCE
Status: DISCONTINUED | OUTPATIENT
Start: 2024-11-16 | End: 2024-11-16

## 2024-11-16 RX ORDER — ALBUTEROL SULFATE 90 UG/1
8 INHALANT RESPIRATORY (INHALATION) ONCE
Status: COMPLETED | OUTPATIENT
Start: 2024-11-16 | End: 2024-11-16

## 2024-11-16 RX ORDER — TRAMADOL HYDROCHLORIDE 50 MG/1
50 TABLET ORAL EVERY 6 HOURS PRN
COMMUNITY
Start: 2024-11-01

## 2024-11-16 RX ORDER — IPRATROPIUM BROMIDE AND ALBUTEROL SULFATE 2.5; .5 MG/3ML; MG/3ML
3 SOLUTION RESPIRATORY (INHALATION) ONCE
Status: COMPLETED | OUTPATIENT
Start: 2024-11-16 | End: 2024-11-16

## 2024-11-16 RX ORDER — MAGNESIUM SULFATE IN WATER 40 MG/ML
2000 INJECTION, SOLUTION INTRAVENOUS ONCE
Status: COMPLETED | OUTPATIENT
Start: 2024-11-16 | End: 2024-11-16

## 2024-11-16 RX ORDER — BENZONATATE 100 MG/1
100 CAPSULE ORAL NIGHTLY PRN
Qty: 7 CAPSULE | Refills: 0 | Status: SHIPPED | OUTPATIENT
Start: 2024-11-16 | End: 2024-11-23

## 2024-11-16 RX ORDER — PREDNISONE 20 MG/1
40 TABLET ORAL DAILY
Qty: 10 TABLET | Refills: 0 | Status: SHIPPED | OUTPATIENT
Start: 2024-11-16 | End: 2024-11-21

## 2024-11-16 RX ORDER — AZITHROMYCIN 500 MG/1
500 TABLET, FILM COATED ORAL DAILY
Qty: 4 TABLET | Refills: 0 | Status: SHIPPED | OUTPATIENT
Start: 2024-11-16 | End: 2024-11-20

## 2024-11-16 RX ADMIN — IPRATROPIUM BROMIDE AND ALBUTEROL SULFATE 3 DOSE: .5; 3 SOLUTION RESPIRATORY (INHALATION) at 15:00

## 2024-11-16 RX ADMIN — ALBUTEROL SULFATE 8 PUFF: 90 AEROSOL, METERED RESPIRATORY (INHALATION) at 13:33

## 2024-11-16 RX ADMIN — MAGNESIUM SULFATE HEPTAHYDRATE 2000 MG: 40 INJECTION, SOLUTION INTRAVENOUS at 14:35

## 2024-11-16 RX ADMIN — METHYLPREDNISOLONE SODIUM SUCCINATE 125 MG: 125 INJECTION INTRAMUSCULAR; INTRAVENOUS at 14:28

## 2024-11-16 RX ADMIN — AZITHROMYCIN 500 MG: 500 TABLET, FILM COATED ORAL at 14:47

## 2024-11-16 ASSESSMENT — PAIN SCALES - GENERAL
PAINLEVEL_OUTOF10: 4
PAINLEVEL_OUTOF10: 8
PAINLEVEL_OUTOF10: 0
PAINLEVEL_OUTOF10: 0

## 2024-11-16 ASSESSMENT — PAIN DESCRIPTION - LOCATION
LOCATION: HEAD
LOCATION: HEAD

## 2024-11-16 ASSESSMENT — PAIN - FUNCTIONAL ASSESSMENT: PAIN_FUNCTIONAL_ASSESSMENT: 0-10

## 2024-11-16 NOTE — ED TRIAGE NOTES
Pt reports cough [id number], states he was treated at this ED for pneumonia 2wks ago, but cough has persisted. Pt concerned for whooping cough. Denies pain.

## 2024-11-16 NOTE — DISCHARGE INSTRUCTIONS
Your prescription has been sent to [facility].    Be sure to contact your primary care provider to arrange for a follow up visit.    I would like for you to have pulmonary function testing (aka lung function tests) once you have recovered from this illness.    If you have any new or worsening issues after going home don't hesitate to return here for reevaluation at any time 24/7!

## 2024-11-17 LAB
EKG ATRIAL RATE: 99 BPM
EKG DIAGNOSIS: NORMAL
EKG P AXIS: 73 DEGREES
EKG P-R INTERVAL: 132 MS
EKG Q-T INTERVAL: 354 MS
EKG QRS DURATION: 94 MS
EKG QTC CALCULATION (BAZETT): 454 MS
EKG R AXIS: 3 DEGREES
EKG T AXIS: 7 DEGREES
EKG VENTRICULAR RATE: 99 BPM

## 2024-11-17 PROCEDURE — 93010 ELECTROCARDIOGRAM REPORT: CPT | Performed by: INTERNAL MEDICINE

## 2024-11-17 NOTE — ED PROVIDER NOTES
[facility]    Name: Brigido Cottrell : 1965 MRN: [id number] Date of Service: 2024    Initial VS: BP: 102/86, Temp: 98.4 °F (36.9 °C), Pulse: (!) 105, Respirations: 20, SpO2: 95 %     CC: coughing, fatigue, difficulty breathing    HPI: this patient is a 59 y.o. male presenting to the ED from home. Mr. Cottrell tells me that for weeks he has had a persistent, incredibly harsh, nonproductive cough. During this same time he has felt minimally fatigued and he has noticed himself getting out of breath more easily than what is typical for him.  Occasionally he has also felt a little bit short of breath while at rest.  He notes that on 2024 he was seen in this department for these symptoms. At that visit he underwent CTA chest, which revealed \"mild left basilar segmental atelectasis versus pneumonia\" and \"bibasilar mucous plugging.\"  He was prescribed courses of amoxicillin/clavulanate, doxycycline, and prednisone for treatment of such.  He felt moderate improvement in his symptoms with these therapies but his symptoms have since recurred since completing them.  As the symptoms did not seem to be resolving over time he returned here for reevaluation this afternoon.  He has not appreciated other changes from his usual state of health and he denies other current complaints.  Of note, he was an every day cigarette smoker from 19 74-19 94.  His significant other at bedside believes that he has previously been told that he likely has \"either asthma or COPD.\" Mr. Cottrell reports that he is very suspicious that he has \" whooping cough.\"  EHR review shows that he received a TDaP vaccine on 2019.  _____________________________________________________________________    Past Medical History:   Diagnosis Date    Atrial fibrillation (HCC)     Class 1 obesity     COVID-19     Hyperlipidemia     Hypertension     Osteoarthritis       Past Surgical History:   Procedure Laterality Date

## 2024-11-20 ASSESSMENT — ENCOUNTER SYMPTOMS
COUGH: 1
ABDOMINAL PAIN: 0
SHORTNESS OF BREATH: 1
TROUBLE SWALLOWING: 0
NAUSEA: 0
SORE THROAT: 0
BACK PAIN: 0
VOMITING: 0

## 2024-12-04 ENCOUNTER — APPOINTMENT (OUTPATIENT)
Dept: GENERAL RADIOLOGY | Age: 59
End: 2024-12-04
Payer: MEDICARE

## 2024-12-04 ENCOUNTER — HOSPITAL ENCOUNTER (EMERGENCY)
Age: 59
Discharge: HOME | End: 2024-12-05
Attending: EMERGENCY MEDICINE
Payer: MEDICARE

## 2024-12-04 DIAGNOSIS — R05.2 SUBACUTE COUGH: Primary | ICD-10-CM

## 2024-12-04 PROCEDURE — 6360000002 HC RX W HCPCS: Performed by: EMERGENCY MEDICINE

## 2024-12-04 PROCEDURE — 99284 EMERGENCY DEPT VISIT MOD MDM: CPT

## 2024-12-04 PROCEDURE — 71046 X-RAY EXAM CHEST 2 VIEWS: CPT

## 2024-12-04 PROCEDURE — 6370000000 HC RX 637 (ALT 250 FOR IP): Performed by: EMERGENCY MEDICINE

## 2024-12-04 PROCEDURE — 96372 THER/PROPH/DIAG INJ SC/IM: CPT

## 2024-12-04 RX ORDER — AZITHROMYCIN 250 MG/1
TABLET, FILM COATED ORAL
Qty: 1 PACKET | Refills: 0 | Status: SHIPPED | OUTPATIENT
Start: 2024-12-04

## 2024-12-04 RX ORDER — DEXTROMETHORPHAN HYDROBROMIDE AND PROMETHAZINE HYDROCHLORIDE 15; 6.25 MG/5ML; MG/5ML
5 SYRUP ORAL 4 TIMES DAILY PRN
Qty: 100 ML | Refills: 0 | Status: SHIPPED | OUTPATIENT
Start: 2024-12-04 | End: 2024-12-11

## 2024-12-04 RX ORDER — IPRATROPIUM BROMIDE AND ALBUTEROL SULFATE 2.5; .5 MG/3ML; MG/3ML
1 SOLUTION RESPIRATORY (INHALATION) ONCE
Status: COMPLETED | OUTPATIENT
Start: 2024-12-04 | End: 2024-12-04

## 2024-12-04 RX ORDER — METHYLPREDNISOLONE SODIUM SUCCINATE 125 MG/2ML
125 INJECTION INTRAMUSCULAR; INTRAVENOUS ONCE
Status: COMPLETED | OUTPATIENT
Start: 2024-12-04 | End: 2024-12-04

## 2024-12-04 RX ORDER — ALBUTEROL SULFATE 90 UG/1
2 INHALANT RESPIRATORY (INHALATION) EVERY 6 HOURS PRN
Qty: 18 G | Refills: 3 | Status: SHIPPED | OUTPATIENT
Start: 2024-12-04

## 2024-12-04 RX ORDER — BENZONATATE 100 MG/1
100 CAPSULE ORAL 2 TIMES DAILY PRN
Qty: 20 CAPSULE | Refills: 0 | Status: SHIPPED | OUTPATIENT
Start: 2024-12-04 | End: 2024-12-11

## 2024-12-04 RX ORDER — PREDNISONE 10 MG/1
TABLET ORAL
Qty: 30 TABLET | Refills: 0 | Status: SHIPPED | OUTPATIENT
Start: 2024-12-04

## 2024-12-04 RX ADMIN — IPRATROPIUM BROMIDE AND ALBUTEROL SULFATE 1 DOSE: .5; 3 SOLUTION RESPIRATORY (INHALATION) at 22:18

## 2024-12-04 RX ADMIN — METHYLPREDNISOLONE SODIUM SUCCINATE 125 MG: 125 INJECTION INTRAMUSCULAR; INTRAVENOUS at 22:21

## 2024-12-04 ASSESSMENT — PAIN - FUNCTIONAL ASSESSMENT: PAIN_FUNCTIONAL_ASSESSMENT: 0-10

## 2024-12-04 ASSESSMENT — PAIN DESCRIPTION - ONSET: ONSET: ON-GOING

## 2024-12-04 ASSESSMENT — PAIN DESCRIPTION - PAIN TYPE: TYPE: ACUTE PAIN

## 2024-12-04 ASSESSMENT — PAIN DESCRIPTION - LOCATION: LOCATION: HEAD

## 2024-12-04 ASSESSMENT — PAIN DESCRIPTION - FREQUENCY: FREQUENCY: CONTINUOUS

## 2024-12-04 ASSESSMENT — PAIN DESCRIPTION - INTENSITY: RATING_2: 6

## 2024-12-04 ASSESSMENT — PAIN SCALES - GENERAL: PAINLEVEL_OUTOF10: 6

## 2024-12-04 ASSESSMENT — PAIN DESCRIPTION - DESCRIPTORS: DESCRIPTORS: ACHING

## 2024-12-05 VITALS
BODY MASS INDEX: 30.91 KG/M2 | WEIGHT: 228.18 LBS | RESPIRATION RATE: 22 BRPM | SYSTOLIC BLOOD PRESSURE: 153 MMHG | HEART RATE: 100 BPM | TEMPERATURE: 98 F | OXYGEN SATURATION: 97 % | HEIGHT: 72 IN | DIASTOLIC BLOOD PRESSURE: 98 MMHG

## 2024-12-05 PROCEDURE — 6370000000 HC RX 637 (ALT 250 FOR IP): Performed by: EMERGENCY MEDICINE

## 2024-12-05 RX ORDER — IPRATROPIUM BROMIDE AND ALBUTEROL SULFATE 2.5; .5 MG/3ML; MG/3ML
1 SOLUTION RESPIRATORY (INHALATION) ONCE
Status: COMPLETED | OUTPATIENT
Start: 2024-12-05 | End: 2024-12-05

## 2024-12-05 RX ADMIN — IPRATROPIUM BROMIDE AND ALBUTEROL SULFATE 1 DOSE: .5; 3 SOLUTION RESPIRATORY (INHALATION) at 00:09

## 2024-12-05 NOTE — ED PROVIDER NOTES
Financial Resource Strain: 0   Food Insecurity: Not on File (2024)    Received from [facility]    Food Insecurity     Food: 0   Transportation Needs: Not on File (2024)    Received from [facility]    Transportation Needs     Transportation: 0   Physical Activity: Not on File (2024)    Received from [facility]    Physical Activity     Physical Activity: 0   Stress: Not on File (2024)    Received from [facility] [facility]    Stress     Stress: 0   Social Connections: Not on File (2024)    Received from [employer] [facility]    Social Connections     Connectedness: 0   Intimate Partner Violence: Unknown (2024)    [facility]    Interpersonal Safety     Feel physically or emotionally unsafe where currently live: Not on file     Harm by anyone: Not on file     Emotionally Harmed: Not on file   Housing Stability: Not on File (2024)    Received from [employer]    Housing Stability     Housin     No current facility-administered medications for this encounter.     Current Outpatient Medications   Medication Sig Dispense Refill    azithromycin (ZITHROMAX) 250 MG tablet Take 500mg (two tablets) on the first day by mouth.  Then take 250mg (one tablet) by mouth daily for 4 more days.  Complete this medication regimen even if you feel better before it is done. 1 packet 0    predniSONE (DELTASONE) 10 MG tablet Take 4 tablets (40mg) by mouth daily x3 days.  Then take 3 tablets (30mg) by mouth daily x3 days.  Then take 2 tablets (20mg) by mouth daily x3 days.  Then take 1 tablet (10mg) by mouth daily x3 days.  Then discontinue this medication.  Do not abruptly stop this medication. 30 tablet 0    promethazine-dextromethorphan (PROMETHAZINE-DM) 6.25-15 MG/5ML syrup Take 5 mLs by mouth 4 times daily as needed for Cough 100 mL 0    benzonatate (TESSALON) 100 MG capsule Take 1 capsule by mouth 2 times daily as needed for Cough 20 capsule 0    albuterol sulfate HFA

## 2024-12-05 NOTE — ED NOTES

## 2024-12-05 NOTE — ED NOTES
Pt was discharged VSS at the time. Pt returned and stated that he was SOB. Pt received a 2nd breathing trt. Pt left before evaluation was done.

## 2024-12-25 ENCOUNTER — APPOINTMENT (OUTPATIENT)
Dept: GENERAL RADIOLOGY | Age: 59
End: 2024-12-25
Attending: EMERGENCY MEDICINE
Payer: MEDICARE

## 2024-12-25 ENCOUNTER — HOSPITAL ENCOUNTER (EMERGENCY)
Age: 59
Discharge: HOME | End: 2024-12-25
Attending: EMERGENCY MEDICINE
Payer: MEDICARE

## 2024-12-25 VITALS
SYSTOLIC BLOOD PRESSURE: 130 MMHG | OXYGEN SATURATION: 100 % | HEIGHT: 72 IN | TEMPERATURE: 98.2 F | RESPIRATION RATE: 16 BRPM | HEART RATE: 80 BPM | WEIGHT: 224.87 LBS | BODY MASS INDEX: 30.46 KG/M2 | DIASTOLIC BLOOD PRESSURE: 81 MMHG

## 2024-12-25 DIAGNOSIS — J20.9 ACUTE BRONCHITIS, UNSPECIFIED ORGANISM: Primary | ICD-10-CM

## 2024-12-25 LAB — SARS-COV-2 RDRP RESP QL NAA+PROBE: NOT DETECTED

## 2024-12-25 PROCEDURE — 87635 SARS-COV-2 COVID-19 AMP PRB: CPT

## 2024-12-25 PROCEDURE — 71046 X-RAY EXAM CHEST 2 VIEWS: CPT

## 2024-12-25 PROCEDURE — 99284 EMERGENCY DEPT VISIT MOD MDM: CPT

## 2024-12-25 PROCEDURE — 6370000000 HC RX 637 (ALT 250 FOR IP): Performed by: EMERGENCY MEDICINE

## 2024-12-25 RX ORDER — PREDNISONE 20 MG/1
40 TABLET ORAL ONCE
Status: COMPLETED | OUTPATIENT
Start: 2024-12-25 | End: 2024-12-25

## 2024-12-25 RX ORDER — BENZONATATE 200 MG/1
200 CAPSULE ORAL 3 TIMES DAILY PRN
Qty: 21 CAPSULE | Refills: 0 | Status: SHIPPED | OUTPATIENT
Start: 2024-12-25 | End: 2025-01-01

## 2024-12-25 RX ORDER — METHYLPREDNISOLONE 4 MG/1
4 TABLET ORAL SEE ADMIN INSTRUCTIONS
Qty: 1 KIT | Refills: 0 | Status: SHIPPED | OUTPATIENT
Start: 2024-12-25 | End: 2024-12-31

## 2024-12-25 RX ORDER — BENZONATATE 200 MG/1
200 CAPSULE ORAL 3 TIMES DAILY PRN
Qty: 21 CAPSULE | Refills: 0 | Status: SHIPPED | OUTPATIENT
Start: 2024-12-25 | End: 2024-12-25

## 2024-12-25 RX ORDER — ALBUTEROL SULFATE 90 UG/1
2 INHALANT RESPIRATORY (INHALATION) 4 TIMES DAILY PRN
Qty: 18 G | Refills: 0 | Status: SHIPPED | OUTPATIENT
Start: 2024-12-25

## 2024-12-25 RX ORDER — BENZONATATE 100 MG/1
200 CAPSULE ORAL ONCE
Status: COMPLETED | OUTPATIENT
Start: 2024-12-25 | End: 2024-12-25

## 2024-12-25 RX ORDER — IPRATROPIUM BROMIDE AND ALBUTEROL SULFATE 2.5; .5 MG/3ML; MG/3ML
1 SOLUTION RESPIRATORY (INHALATION) ONCE
Status: COMPLETED | OUTPATIENT
Start: 2024-12-25 | End: 2024-12-25

## 2024-12-25 RX ADMIN — BENZONATATE 200 MG: 100 CAPSULE ORAL at 12:06

## 2024-12-25 RX ADMIN — PREDNISONE 40 MG: 20 TABLET ORAL at 12:06

## 2024-12-25 RX ADMIN — IPRATROPIUM BROMIDE AND ALBUTEROL SULFATE 1 DOSE: .5; 2.5 SOLUTION RESPIRATORY (INHALATION) at 11:03

## 2024-12-25 ASSESSMENT — PAIN SCALES - GENERAL
PAINLEVEL_OUTOF10: 0

## 2024-12-25 ASSESSMENT — PAIN - FUNCTIONAL ASSESSMENT: PAIN_FUNCTIONAL_ASSESSMENT: 0-10

## 2024-12-25 NOTE — ED PROVIDER NOTES
[facility]  EMERGENCY DEPARTMENT ENCOUNTER      Pt Name: Brigido Cottrell  MRN: [id number]  Birthdate 1965  Date of evaluation: 12/25/2024  Provider: NAY ROTHMAN DO    CHIEF COMPLAINT  History given by: PATIENT   Chief Complaint   Patient presents with    Shortness of Breath     Has been here for ta couple of times this month.  States he was told he had whooping cough and he was placed on antibiotics but it never got worse         This patient is at risk for a communicable infection.  Therefore, personal protection equipment consisting of a mask was worn for the exam.    HPI  Brigido Cottrell is a 59 y.o. male who presents with shortness of breath.  Has been here couple times.  He was diagnosed with whooping cough.  He has been on 4 courses of 3 different antibiotics.  This includes Augmentin, doxycycline, Zithromax.  However, he does not feel that the antibiotics are \"strong enough.\"  He states has been coughing and mild shortness of breath.  Denies any wheezing at this time.  He denies any other complaints.    REVIEW OF SYSTEMS  All systems negative except as marked.  Reviewed Nurses' notes and concur.    No LMP for male patient.    PAST MEDICAL HISTORY  Past Medical History:   Diagnosis Date    Atrial fibrillation (HCC)     Class 1 obesity     COVID-19     Hyperlipidemia     Hypertension     Osteoarthritis        FAMILY HISTORY  Family History   Problem Relation Age of Onset    Kidney Disease Mother     Heart Failure Mother     Heart Attack Father 47    Heart Disease Father     Kidney Disease Brother        SOCIAL HISTORY   reports that he quit smoking about 31 years ago. His smoking use included cigarettes. He started smoking about 51 years ago. He has a 10 pack-year smoking history. He has never used smokeless tobacco. He reports that he does not currently use alcohol. He reports that he does not use drugs.    SURGICAL HISTORY  Past Surgical History:   Procedure

## 2024-12-25 NOTE — ED NOTES
States breathing continues to improve  Laying supine watching tv  Walked out with ease  skin warm and dry  resp easy and unlabored Aware next prednisone is tomorrow  to increase fluids  To follow up with pmd as scheduled

## 2024-12-29 ENCOUNTER — OFFICE VISIT (OUTPATIENT)
Age: 59
End: 2024-12-29

## 2024-12-29 VITALS
HEART RATE: 98 BPM | WEIGHT: 229 LBS | DIASTOLIC BLOOD PRESSURE: 90 MMHG | OXYGEN SATURATION: 95 % | BODY MASS INDEX: 31.02 KG/M2 | TEMPERATURE: 98.5 F | SYSTOLIC BLOOD PRESSURE: 135 MMHG | HEIGHT: 72 IN

## 2024-12-29 DIAGNOSIS — J01.10 ACUTE FRONTAL SINUSITIS, RECURRENCE NOT SPECIFIED: Primary | ICD-10-CM

## 2024-12-29 DIAGNOSIS — R03.0 ELEVATED BLOOD PRESSURE READING: ICD-10-CM

## 2024-12-29 RX ORDER — PREDNISONE 20 MG/1
20 TABLET ORAL DAILY
Qty: 5 TABLET | Refills: 0 | Status: SHIPPED | OUTPATIENT
Start: 2024-12-29 | End: 2025-01-03

## 2025-02-04 ENCOUNTER — OFFICE VISIT (OUTPATIENT)
Age: 60
End: 2025-02-04

## 2025-02-04 VITALS
SYSTOLIC BLOOD PRESSURE: 128 MMHG | HEIGHT: 71 IN | BODY MASS INDEX: 32.48 KG/M2 | TEMPERATURE: 98.1 F | OXYGEN SATURATION: 93 % | HEART RATE: 91 BPM | WEIGHT: 232 LBS | DIASTOLIC BLOOD PRESSURE: 79 MMHG

## 2025-02-04 DIAGNOSIS — J40 BRONCHITIS: Primary | ICD-10-CM

## 2025-02-04 DIAGNOSIS — R06.2 WHEEZING: ICD-10-CM

## 2025-02-04 DIAGNOSIS — R05.1 ACUTE COUGH: ICD-10-CM

## 2025-02-04 RX ORDER — ALBUTEROL SULFATE 90 UG/1
2 INHALANT RESPIRATORY (INHALATION) 4 TIMES DAILY PRN
Qty: 18 G | Refills: 0 | Status: SHIPPED | OUTPATIENT
Start: 2025-02-04

## 2025-02-04 RX ORDER — DOXYCYCLINE HYCLATE 100 MG
100 TABLET ORAL 2 TIMES DAILY
Qty: 14 TABLET | Refills: 0 | Status: SHIPPED | OUTPATIENT
Start: 2025-02-04 | End: 2025-02-11

## 2025-02-04 RX ORDER — PREDNISONE 20 MG/1
20 TABLET ORAL 2 TIMES DAILY
Qty: 10 TABLET | Refills: 0 | Status: SHIPPED | OUTPATIENT
Start: 2025-02-04 | End: 2025-02-09

## 2025-02-04 RX ORDER — BENZONATATE 100 MG/1
100 CAPSULE ORAL 3 TIMES DAILY PRN
Qty: 10 CAPSULE | Refills: 0 | Status: SHIPPED | OUTPATIENT
Start: 2025-02-04 | End: 2025-02-25

## 2025-02-04 RX ORDER — ALBUTEROL SULFATE 0.83 MG/ML
2.5 SOLUTION RESPIRATORY (INHALATION) ONCE
Status: COMPLETED | OUTPATIENT
Start: 2025-02-04 | End: 2025-02-04

## 2025-02-04 RX ADMIN — ALBUTEROL SULFATE 2.5 MG: 0.83 SOLUTION RESPIRATORY (INHALATION) at 15:50

## 2025-02-04 ASSESSMENT — ENCOUNTER SYMPTOMS
DIARRHEA: 0
WHEEZING: 1
CONSTIPATION: 0
SINUS PAIN: 1
ABDOMINAL PAIN: 0
SHORTNESS OF BREATH: 0
VOMITING: 0
COUGH: 1
SORE THROAT: 0
CHEST TIGHTNESS: 0
NAUSEA: 0

## 2025-02-04 NOTE — PROGRESS NOTES
Brigido Cottrell (:  1965) is a 59 y.o. male,New patient, here for evaluation of the following chief complaint(s):  Cough (Wet cough, headaches, sinus congestion x 2 days)      ASSESSMENT/PLAN:    ICD-10-CM    1. Bronchitis  J40 albuterol sulfate HFA (VENTOLIN HFA) 108 (90 Base) MCG/ACT inhaler     predniSONE (DELTASONE) 20 MG tablet     doxycycline hyclate (VIBRA-TABS) 100 MG tablet      2. Wheezing  R06.2 albuterol (PROVENTIL) (2.5 MG/3ML) 0.083% nebulizer solution 2.5 mg     XR CHEST STANDARD (2 VW)     albuterol sulfate HFA (VENTOLIN HFA) 108 (90 Base) MCG/ACT inhaler     predniSONE (DELTASONE) 20 MG tablet     doxycycline hyclate (VIBRA-TABS) 100 MG tablet      3. Acute cough  R05.1 benzonatate (TESSALON PERLES) 100 MG capsule          Patient presents for cough congestion.  On exam he does have diffuse expiratory wheezing noted.  Patient was given 1 albuterol nebulizer here.  On repeat examination this did improve wheezing.  Chest x-ray per radiologist report shows no acute findings.  DDx: Bronchitis versus pneumonia versus URI  Given patient is a former smoker, will prescribe doxycycline, and will prescribe prednisone and albuterol inhaler for wheezing and concerns of bronchitis  Patient prescribed Tessalon for cough for symptomatic care.  Please take medication as prescribed  If  symptoms worsen please go to ED immediately.   Patient understands that if symptoms do worsen he should go to the ED immediately.  SUBJECTIVE/OBJECTIVE:    History provided by:  Patient   used: No      HPI:   59 y.o. male presents with symptoms of cough congestion ongoing for about 2 days.  Patient states that he does not use any inhalers does not have asthma.  He is not a current smoker he is a former smoker.  Patient states that he has taken NyQuil for symptoms.  No known sick contacts.  He states that cough is wet and productive.        Vitals:    25 1457   BP: 128/79   Site: Right Upper Arm

## 2025-02-25 ENCOUNTER — HOSPITAL ENCOUNTER (EMERGENCY)
Age: 60
Discharge: HOME | End: 2025-02-25
Attending: EMERGENCY MEDICINE
Payer: MEDICAID

## 2025-02-25 ENCOUNTER — APPOINTMENT (OUTPATIENT)
Dept: GENERAL RADIOLOGY | Age: 60
End: 2025-02-25
Payer: MEDICAID

## 2025-02-25 VITALS
WEIGHT: 229.94 LBS | BODY MASS INDEX: 31.14 KG/M2 | HEIGHT: 72 IN | HEART RATE: 88 BPM | RESPIRATION RATE: 16 BRPM | DIASTOLIC BLOOD PRESSURE: 87 MMHG | OXYGEN SATURATION: 97 % | SYSTOLIC BLOOD PRESSURE: 138 MMHG | TEMPERATURE: 97.8 F

## 2025-02-25 DIAGNOSIS — R05.2 SUBACUTE COUGH: Primary | ICD-10-CM

## 2025-02-25 LAB
FLUAV RNA UPPER RESP QL NAA+PROBE: NEGATIVE
FLUBV AG NPH QL: NEGATIVE
SARS-COV-2 RDRP RESP QL NAA+PROBE: NOT DETECTED

## 2025-02-25 PROCEDURE — 71046 X-RAY EXAM CHEST 2 VIEWS: CPT

## 2025-02-25 PROCEDURE — 6370000000 HC RX 637 (ALT 250 FOR IP): Performed by: EMERGENCY MEDICINE

## 2025-02-25 PROCEDURE — 99284 EMERGENCY DEPT VISIT MOD MDM: CPT

## 2025-02-25 PROCEDURE — 87635 SARS-COV-2 COVID-19 AMP PRB: CPT

## 2025-02-25 PROCEDURE — 87804 INFLUENZA ASSAY W/OPTIC: CPT

## 2025-02-25 RX ORDER — IPRATROPIUM BROMIDE AND ALBUTEROL SULFATE 2.5; .5 MG/3ML; MG/3ML
1 SOLUTION RESPIRATORY (INHALATION)
Status: DISCONTINUED | OUTPATIENT
Start: 2025-02-25 | End: 2025-02-25 | Stop reason: HOSPADM

## 2025-02-25 RX ADMIN — IPRATROPIUM BROMIDE AND ALBUTEROL SULFATE 1 DOSE: .5; 2.5 SOLUTION RESPIRATORY (INHALATION) at 07:23

## 2025-02-25 ASSESSMENT — PAIN - FUNCTIONAL ASSESSMENT
PAIN_FUNCTIONAL_ASSESSMENT: NONE - DENIES PAIN
PAIN_FUNCTIONAL_ASSESSMENT: NONE - DENIES PAIN

## 2025-02-25 NOTE — ED PROVIDER NOTES
[facility] EMERGENCY DEPARTMENT    CHIEF COMPLAINT  Cold Symptoms (Per pt, he's been dealing with URI since NOV. Seen multiple times for the same complaint.  Congestion,productive cough; thin and thick phlegm O2 on RA 97%)       HISTORY OF PRESENT ILLNESS  Brigido Cottrell is a 59 y.o. male who presents to the ED complaining of cough. Four presentations to ED since November for same. Cough intermittent, seems to wax/wane in intensity. Denies fever or chest pain. Complains of SOB. Cough productive of white/grey sputum. Denies nausea, vomiting, or diarrhea. No sick contacts or recent travel. When seen previously has been treated with various abx incl Augmentin, doxy, azithromycin. Hasn't smoked in > 40 years. Seen at  2/4/25 and diagnosed with bronchitis/wheezing. Was prescribed albuterol, steroids, doxycyline, and Tessalon perles.     I have reviewed the following from the nursing documentation:    Past Medical History:   Diagnosis Date    Atrial fibrillation (HCC)     Class 1 obesity     COVID-19     Hyperlipidemia     Hypertension     Osteoarthritis      Past Surgical History:   Procedure Laterality Date    ABLATION OF DYSRHYTHMIC FOCUS  2022    for atrial fibrillation treatment    APPENDECTOMY      COLONOSCOPY      KNEE ARTHROSCOPY Right 11/02/2022    RIGHT KNEE ARTHROSCOPY WITH MEDIAL MENISCUS REPAIR performed by Brandon Garcia MD at [facility] OR    MANDIBLE SURGERY      SINUS ENDOSCOPY Right 10/03/2023    RESECTION OF RIGHT INTRANASAL MASS, LEFT NASAL ENDOSCOPY WITH BIOPSY performed by Wyatt Vu MD at [facility] OR    TOTAL KNEE ARTHROPLASTY Right 05/01/2024    RIGHT TOTAL KNEE ARTHROPLASTY performed by Brandon Garcia MD at [facility] OR     Family History   Problem Relation Age of Onset    Kidney Disease Mother     Heart Failure Mother     Heart Attack Father 47    Heart Disease Father     Kidney Disease Brother      Social History     Socioeconomic History    Marital status:

## 2025-02-25 NOTE — ED NOTES
Introduce myself to the patient, identification band inplace, stretcher in the lowest position for safety, and the call light is in reach. Updated patient on Emergency Department plan of care, no additional needs at this time, will continue with care.

## 2025-03-01 ENCOUNTER — HOSPITAL ENCOUNTER (EMERGENCY)
Age: 60
Discharge: HOME | End: 2025-03-01
Attending: STUDENT IN AN ORGANIZED HEALTH CARE EDUCATION/TRAINING PROGRAM
Payer: COMMERCIAL

## 2025-03-01 ENCOUNTER — APPOINTMENT (OUTPATIENT)
Dept: GENERAL RADIOLOGY | Age: 60
End: 2025-03-01
Payer: COMMERCIAL

## 2025-03-01 VITALS
OXYGEN SATURATION: 100 % | DIASTOLIC BLOOD PRESSURE: 100 MMHG | TEMPERATURE: 98.2 F | WEIGHT: 229.94 LBS | RESPIRATION RATE: 20 BRPM | BODY MASS INDEX: 31.19 KG/M2 | HEART RATE: 90 BPM | SYSTOLIC BLOOD PRESSURE: 170 MMHG

## 2025-03-01 DIAGNOSIS — R05.3 CHRONIC COUGH: Primary | ICD-10-CM

## 2025-03-01 DIAGNOSIS — R06.2 WHEEZING: ICD-10-CM

## 2025-03-01 PROCEDURE — 71046 X-RAY EXAM CHEST 2 VIEWS: CPT

## 2025-03-01 PROCEDURE — 99283 EMERGENCY DEPT VISIT LOW MDM: CPT

## 2025-03-01 PROCEDURE — 6370000000 HC RX 637 (ALT 250 FOR IP): Performed by: STUDENT IN AN ORGANIZED HEALTH CARE EDUCATION/TRAINING PROGRAM

## 2025-03-01 RX ORDER — ONDANSETRON 4 MG/1
4 TABLET, FILM COATED ORAL EVERY 12 HOURS PRN
Qty: 10 TABLET | Refills: 0 | Status: SHIPPED | OUTPATIENT
Start: 2025-03-01 | End: 2025-03-06

## 2025-03-01 RX ORDER — IPRATROPIUM BROMIDE AND ALBUTEROL SULFATE 2.5; .5 MG/3ML; MG/3ML
1 SOLUTION RESPIRATORY (INHALATION) ONCE
Status: COMPLETED | OUTPATIENT
Start: 2025-03-01 | End: 2025-03-01

## 2025-03-01 RX ORDER — ALBUTEROL SULFATE 90 UG/1
2 INHALANT RESPIRATORY (INHALATION) EVERY 4 HOURS PRN
Qty: 1 EACH | Refills: 0 | Status: SHIPPED | OUTPATIENT
Start: 2025-03-01 | End: 2025-03-31

## 2025-03-01 RX ORDER — BENZONATATE 100 MG/1
100 CAPSULE ORAL 2 TIMES DAILY PRN
Qty: 20 CAPSULE | Refills: 0 | Status: SHIPPED | OUTPATIENT
Start: 2025-03-01 | End: 2025-03-08

## 2025-03-01 RX ORDER — BENZONATATE 100 MG/1
100 CAPSULE ORAL ONCE
Status: COMPLETED | OUTPATIENT
Start: 2025-03-01 | End: 2025-03-01

## 2025-03-01 RX ORDER — PREDNISONE 20 MG/1
40 TABLET ORAL ONCE
Status: COMPLETED | OUTPATIENT
Start: 2025-03-01 | End: 2025-03-01

## 2025-03-01 RX ORDER — METHYLPREDNISOLONE 4 MG/1
TABLET ORAL
Qty: 1 KIT | Refills: 0 | Status: SHIPPED | OUTPATIENT
Start: 2025-03-01 | End: 2025-03-07

## 2025-03-01 RX ADMIN — IPRATROPIUM BROMIDE AND ALBUTEROL SULFATE 1 DOSE: .5; 2.5 SOLUTION RESPIRATORY (INHALATION) at 08:26

## 2025-03-01 RX ADMIN — IPRATROPIUM BROMIDE AND ALBUTEROL SULFATE 1 DOSE: .5; 2.5 SOLUTION RESPIRATORY (INHALATION) at 08:03

## 2025-03-01 RX ADMIN — BENZONATATE 100 MG: 100 CAPSULE ORAL at 08:06

## 2025-03-01 RX ADMIN — PREDNISONE 40 MG: 20 TABLET ORAL at 08:06

## 2025-03-01 ASSESSMENT — PAIN DESCRIPTION - LOCATION
LOCATION: HEAD
LOCATION: HEAD

## 2025-03-01 ASSESSMENT — PAIN - FUNCTIONAL ASSESSMENT
PAIN_FUNCTIONAL_ASSESSMENT: 0-10
PAIN_FUNCTIONAL_ASSESSMENT: 0-10

## 2025-03-01 ASSESSMENT — PAIN SCALES - GENERAL
PAINLEVEL_OUTOF10: 10
PAINLEVEL_OUTOF10: 8

## 2025-03-01 NOTE — DISCHARGE INSTRUCTIONS
You were seen today in the emergency department for chronic cough.  Your symptoms improved with treatments here in the emergency department for short course of steroids has been sent to your pharmacy, please take it as prescribed.  Please note that a list of primary physicians in the area has been provided to you below, you may contact them at your convenience.  It is extremely important that you follow-up with your pulmonology appointment that is upcoming.  Please take your medications as prescribed.  Please turn to the emergency department if you experience any further concerning symptoms.    [facility] Referral number [phone] for Primary Care      [facility]  [facility]  [address] [address] [phone]  Fax [phone]  Medical, OB/Gyn, Pediatrics, [facility]  Serves all of [facility])  [address]  [place], [place] [address]  [phone]       [address] (Administrative offices)  [phone]  Homeless only [facility] ([place])  [address],  [address]  [phone] or [phone], Citizen of [place] [phone],   Dental Appointments [phone] or [phone]  Pediatric, Family Practice, X-Ray  Serves all of [facility] ([facility])  [address]  [place], [place] [id number] [phone]   [facility] ([facility])   [address]    (Located in [facility])  [phone] or [phone], Citizen of [place] [phone],   Dental Appointments [phone] or [phone]     [facility]  [address], [place] [address]  [phone] [place] [address] [facility]  [phone]   [facility]  [address]  [id number] [phone] Fax [phone]  General Practice    Serves [place] and Surrounding areas [facility]  [address]   Fax:  [phone]  [facility]  [phone]  Fax:  [phone]  General Medical Clinic  Resident Practice  [phone]  Fax:  [phone]  Medical Specialty Center  [phone]  Fax:  [phone]  Orthopaedics  [phone]     [facility] ([facility] [place])  [address], [place] [address]  [phone]    [place] ([facility]   ([facility] [place])  [address]  [place], [place] [address]  [phone]   General acute hospital Pediatrics [facility]   (formerly [facility])   [address]  [place], [place] [address]  [phone] OB/GYN Services   [facility]   ([facility])  [address]  [place], [place] [address]  [phone]       [facility]  ([facility])  [address] [place], [place] [address]  [phone] [facility]  ([facility])  [address]  [place], [place] [address]  [phone]     [facility]  ([facility] [place])  [address] [place], [place] [address]  [phone]   [facility] ([facility])  [address] [place], [place] [address]  [phone]    [facility]  [address], [place] [address]  [phone]  General Family Practice, Pediatrics  Services all areas sliding scale fee     [facility]  [address] [place], [place]  [address]  [phone]  General Medical Clinic, Pediatrics, [facility]   (formerly [facility])  [address]  [place], [place] [address]  [phone]   [facility] ([facility])  [address]  [place], [place] [address] [phone]

## 2025-03-01 NOTE — ED PROVIDER NOTES
[facility] EMERGENCY DEPARTMENT      EMERGENCY MEDICINE     Pt Name: Brigido Cottrell  MRN: [id number]  Birthdate 1965  Date of evaluation: 3/1/2025  Provider: Benedict Solitario DO    CHIEF COMPLAINT       Chief Complaint   Patient presents with    Cough     C/o cough for 1 month.  States he's been seen 4 times for same and is not getting better.     HISTORY OF PRESENT ILLNESS   Brigido Cottrell is a 59 y.o. male who presents to the emergency department for chronic cough.  The cough started back in November.  He has been seen multiple times in the emergency department for this complaint.  He has not followed up with pulmonology which he was referred to his he could not get an appointment until 14 March.  Family member with him states that his cough seems to be worse at night and she has noticed he has been wheezing.  He is not a tobacco user.  He does not have a history of asthma.  The patient states that he is convinced that he has whooping cough because he saw on the news that this was going around late in 2024 that came from [facility].  He states he is not vaccinated against whooping cough but tells me he has had all of his appropriate tetanus shots over the years which means he is vaccinated against pertussis.  The cough is sometimes productive of strings of phlegm which are clear in nature.  He has not been running fevers.  He is not having chest pain.  He is requesting an antibiotic.  States he has not been taking the medications he was previously prescribed because he \"feels too bad\".  Of note the family member states that they have a new pet.  He has been prescribed multiple rounds of antibiotics which do not seem to have alleviated his symptoms.  He has not followed up with his regular doctor.    Chart reviewed which show that his symptoms of a persistent cough date all the way back to 2019        PASTMEDICAL HISTORY     Past Medical History:   Diagnosis Date    Atrial

## 2025-03-01 NOTE — ED NOTES
AVS reviewed with patient and wife Verbalized understanding.   AVS was printed and given to wife  Prescriptions sent electronically to patients pharmacy of choice.

## 2025-03-07 ENCOUNTER — OFFICE VISIT (OUTPATIENT)
Dept: INTERNAL MEDICINE CLINIC | Age: 60
End: 2025-03-07
Payer: COMMERCIAL

## 2025-03-07 VITALS
TEMPERATURE: 98.4 F | BODY MASS INDEX: 30.52 KG/M2 | HEART RATE: 95 BPM | SYSTOLIC BLOOD PRESSURE: 110 MMHG | OXYGEN SATURATION: 97 % | WEIGHT: 225 LBS | DIASTOLIC BLOOD PRESSURE: 70 MMHG

## 2025-03-07 DIAGNOSIS — R05.9 COUGH, UNSPECIFIED TYPE: Primary | ICD-10-CM

## 2025-03-07 LAB
INFLUENZA A ANTIGEN, POC: NEGATIVE
INFLUENZA B ANTIGEN, POC: NEGATIVE
LOT EXPIRE DATE: NORMAL
LOT KIT NUMBER: NORMAL
SARS-COV-2 RNA, POC: NEGATIVE
VALID INTERNAL CONTROL: NORMAL
VENDOR AND KIT NAME POC: NORMAL

## 2025-03-07 PROCEDURE — G8417 CALC BMI ABV UP PARAM F/U: HCPCS | Performed by: STUDENT IN AN ORGANIZED HEALTH CARE EDUCATION/TRAINING PROGRAM

## 2025-03-07 PROCEDURE — G8427 DOCREV CUR MEDS BY ELIG CLIN: HCPCS | Performed by: STUDENT IN AN ORGANIZED HEALTH CARE EDUCATION/TRAINING PROGRAM

## 2025-03-07 PROCEDURE — 3078F DIAST BP <80 MM HG: CPT | Performed by: STUDENT IN AN ORGANIZED HEALTH CARE EDUCATION/TRAINING PROGRAM

## 2025-03-07 PROCEDURE — 1036F TOBACCO NON-USER: CPT | Performed by: STUDENT IN AN ORGANIZED HEALTH CARE EDUCATION/TRAINING PROGRAM

## 2025-03-07 PROCEDURE — 87428 SARSCOV & INF VIR A&B AG IA: CPT | Performed by: STUDENT IN AN ORGANIZED HEALTH CARE EDUCATION/TRAINING PROGRAM

## 2025-03-07 PROCEDURE — 99212 OFFICE O/P EST SF 10 MIN: CPT | Performed by: STUDENT IN AN ORGANIZED HEALTH CARE EDUCATION/TRAINING PROGRAM

## 2025-03-07 PROCEDURE — 3074F SYST BP LT 130 MM HG: CPT | Performed by: STUDENT IN AN ORGANIZED HEALTH CARE EDUCATION/TRAINING PROGRAM

## 2025-03-07 PROCEDURE — 3017F COLORECTAL CA SCREEN DOC REV: CPT | Performed by: STUDENT IN AN ORGANIZED HEALTH CARE EDUCATION/TRAINING PROGRAM

## 2025-03-07 RX ORDER — FLUTICASONE PROPIONATE AND SALMETEROL 100; 50 UG/1; UG/1
1 POWDER RESPIRATORY (INHALATION) EVERY 12 HOURS
Qty: 1 EACH | Refills: 2 | Status: SHIPPED | OUTPATIENT
Start: 2025-03-07 | End: 2025-04-06

## 2025-03-07 RX ORDER — PREDNISONE 20 MG/1
TABLET ORAL
Qty: 9 TABLET | Refills: 0 | Status: SHIPPED | OUTPATIENT
Start: 2025-03-07 | End: 2025-03-14

## 2025-03-07 RX ORDER — OSELTAMIVIR PHOSPHATE 75 MG/1
75 CAPSULE ORAL 2 TIMES DAILY
Qty: 10 CAPSULE | Refills: 0 | Status: SHIPPED | OUTPATIENT
Start: 2025-03-07 | End: 2025-03-12

## 2025-03-07 ASSESSMENT — PATIENT HEALTH QUESTIONNAIRE - PHQ9
9. THOUGHTS THAT YOU WOULD BE BETTER OFF DEAD, OR OF HURTING YOURSELF: NOT AT ALL
SUM OF ALL RESPONSES TO PHQ QUESTIONS 1-9: 0
2. FEELING DOWN, DEPRESSED OR HOPELESS: NOT AT ALL
SUM OF ALL RESPONSES TO PHQ QUESTIONS 1-9: 0
SUM OF ALL RESPONSES TO PHQ QUESTIONS 1-9: 0
6. FEELING BAD ABOUT YOURSELF - OR THAT YOU ARE A FAILURE OR HAVE LET YOURSELF OR YOUR FAMILY DOWN: NOT AT ALL
SUM OF ALL RESPONSES TO PHQ QUESTIONS 1-9: 0
4. FEELING TIRED OR HAVING LITTLE ENERGY: NOT AT ALL
7. TROUBLE CONCENTRATING ON THINGS, SUCH AS READING THE NEWSPAPER OR WATCHING TELEVISION: NOT AT ALL
1. LITTLE INTEREST OR PLEASURE IN DOING THINGS: NOT AT ALL
10. IF YOU CHECKED OFF ANY PROBLEMS, HOW DIFFICULT HAVE THESE PROBLEMS MADE IT FOR YOU TO DO YOUR WORK, TAKE CARE OF THINGS AT HOME, OR GET ALONG WITH OTHER PEOPLE: NOT DIFFICULT AT ALL
5. POOR APPETITE OR OVEREATING: NOT AT ALL
3. TROUBLE FALLING OR STAYING ASLEEP: NOT AT ALL
8. MOVING OR SPEAKING SO SLOWLY THAT OTHER PEOPLE COULD HAVE NOTICED. OR THE OPPOSITE, BEING SO FIGETY OR RESTLESS THAT YOU HAVE BEEN MOVING AROUND A LOT MORE THAN USUAL: NOT AT ALL

## 2025-03-07 NOTE — PROGRESS NOTES
Brigido Cottrell (:  1965) is a 59 y.o. male,Established patient, here for evaluation of the following chief complaint(s): Respiratory illness.    Pt is a 59 year old male with past medical history as noted below.    Active Ambulatory Problems     Diagnosis Date Noted    Primary hypertension 2023    Mixed hyperlipidemia 2023    Primary osteoarthritis of right knee 2024    Primary osteoarthritis of left knee 2024    Bacterial sinusitis 2025    Bacterial conjunctivitis 2025    Chronic cough 2025     Resolved Ambulatory Problems     Diagnosis Date Noted    Paresthesia 2023    Paresthesias 2023    PAF (paroxysmal atrial fibrillation) ([facility]) 2023    Left atrial flutter by electrocardiogram ([facility]) 2023    Persistent atrial fibrillation ([facility]) 2023     Past Medical History:   Diagnosis Date    Atrial fibrillation ([facility])     Class 1 obesity     COVID-19     Hyperlipidemia     Hypertension     Osteoarthritis      He presents to the office today for complaints of conjunctivitis like symptoms that started 3/7. He has associated rhinorrhea, watering, headache, diarrhea, or body aches. Does report possible ill exposures at home that he is aware of. Denies fevers, chest pain, abdominal, dizziness, nausea or vomiting. Has been dealing with a cough since November. He has completed several rounds of ABX incl Augmentin, doxy, azithromycin and steroids without relief in his symptoms. Allyson any history of tobacco abuse or smoking. Sometimes productive - was clear for a while and is now dark sputum.     Vitals:    25 1346   BP: 110/70   Pulse: 95   Temp: 98.4 °F (36.9 °C)   SpO2: 97%        Assessment & Plan  Cough, unspecified type   Acute condition, new, Supportive care with appropriate antipyretics and fluids.  Educational material distributed and questions answered.  - START prednisone and Tamiflu as discussed.  - Follow up if symptoms persist or

## 2025-03-11 ENCOUNTER — TELEPHONE (OUTPATIENT)
Dept: INTERNAL MEDICINE CLINIC | Age: 60
End: 2025-03-11

## 2025-03-11 NOTE — TELEPHONE ENCOUNTER
He can use warm compresses to massage the eyes.  If there is redness to the eyes or drainage during the day he should come in so we can see if there is any concern for bacterial conjunctivitis or pinkeye.

## 2025-03-11 NOTE — TELEPHONE ENCOUNTER
Pt states no this sx been going on just getting worse wake up eyes sealed shut feeling and now a little pink still taking meds that where given 4 days ago.

## 2025-03-12 ENCOUNTER — OFFICE VISIT (OUTPATIENT)
Dept: PULMONOLOGY | Age: 60
End: 2025-03-12
Payer: COMMERCIAL

## 2025-03-12 VITALS
RESPIRATION RATE: 18 BRPM | HEART RATE: 100 BPM | SYSTOLIC BLOOD PRESSURE: 124 MMHG | WEIGHT: 229.2 LBS | OXYGEN SATURATION: 96 % | BODY MASS INDEX: 31.04 KG/M2 | HEIGHT: 72 IN | TEMPERATURE: 97.2 F | DIASTOLIC BLOOD PRESSURE: 80 MMHG

## 2025-03-12 DIAGNOSIS — R05.3 CHRONIC COUGH: ICD-10-CM

## 2025-03-12 DIAGNOSIS — H10.9 BACTERIAL CONJUNCTIVITIS: Primary | ICD-10-CM

## 2025-03-12 DIAGNOSIS — B96.89 BACTERIAL SINUSITIS: ICD-10-CM

## 2025-03-12 DIAGNOSIS — J32.9 BACTERIAL SINUSITIS: ICD-10-CM

## 2025-03-12 PROCEDURE — 3017F COLORECTAL CA SCREEN DOC REV: CPT | Performed by: INTERNAL MEDICINE

## 2025-03-12 PROCEDURE — G8427 DOCREV CUR MEDS BY ELIG CLIN: HCPCS | Performed by: INTERNAL MEDICINE

## 2025-03-12 PROCEDURE — 3074F SYST BP LT 130 MM HG: CPT | Performed by: INTERNAL MEDICINE

## 2025-03-12 PROCEDURE — 3079F DIAST BP 80-89 MM HG: CPT | Performed by: INTERNAL MEDICINE

## 2025-03-12 PROCEDURE — 99204 OFFICE O/P NEW MOD 45 MIN: CPT | Performed by: INTERNAL MEDICINE

## 2025-03-12 PROCEDURE — G8417 CALC BMI ABV UP PARAM F/U: HCPCS | Performed by: INTERNAL MEDICINE

## 2025-03-12 PROCEDURE — 1036F TOBACCO NON-USER: CPT | Performed by: INTERNAL MEDICINE

## 2025-03-12 RX ORDER — AMOXICILLIN AND CLAVULANATE POTASSIUM 500; 125 MG/1; MG/1
1 TABLET, FILM COATED ORAL 3 TIMES DAILY
Qty: 30 TABLET | Refills: 0 | Status: SHIPPED | OUTPATIENT
Start: 2025-03-12 | End: 2025-03-22

## 2025-03-12 RX ORDER — BROMPHENIRAMINE MALEATE, PSEUDOEPHEDRINE HYDROCHLORIDE, AND DEXTROMETHORPHAN HYDROBROMIDE 2; 30; 10 MG/5ML; MG/5ML; MG/5ML
5 SYRUP ORAL 4 TIMES DAILY PRN
Qty: 473 ML | Refills: 0 | Status: SHIPPED | OUTPATIENT
Start: 2025-03-12

## 2025-03-12 RX ORDER — ERYTHROMYCIN 5 MG/G
OINTMENT OPHTHALMIC
Qty: 3.5 G | Refills: 0 | Status: SHIPPED | OUTPATIENT
Start: 2025-03-12 | End: 2025-03-22

## 2025-03-12 RX ORDER — IPRATROPIUM BROMIDE 21 UG/1
2 SPRAY, METERED NASAL 2 TIMES DAILY
Qty: 30 ML | Refills: 2 | Status: SHIPPED | OUTPATIENT
Start: 2025-03-12

## 2025-03-12 NOTE — PROGRESS NOTES
REASON FOR CONSULTATION/CC: cough  Chief Complaint   Patient presents with    New Patient        Consult at request of   Paloma Huizar MD for      PCP: Paloma Huizar MD    HISTORY OF PRESENT ILLNESS: Brigido Cottrell is a 59 y.o. year old male with a history of          Walked into the room to find patient laying on bed with coat over head.       Patient states he become sick in November.      Contniues to have cough but not able to stop. Difficult to sleep      Started on Wixela . No change in cough but states breathing improved. complains of thrush.      Cough is better with less phlegm      Cough worse with laying down.  Lot of sinus congestion and drainage.            Eyes:   Started with drainage / puss from the eyes 3-4 days ago.      Tx: antibiotics inhalers and pred nison.      Tobacco:   Never smoker:     Retired: disabled.     REVIEW OF SYSTEMS:  Constitutional: Negative for fever    HENT: Negative for sore throat  Eyes: Negative for redness   Respiratory: Negative for dyspnea, cough  Cardiovascular: Negative for chest pain  Gastrointestinal: Negative for vomiting, diarrhea   Genitourinary: Negative for hematuria   Musculoskeletal: Negative for arthralgias   Skin: Negative for rash  Neurological: Negative for syncope  Hematological: Negative for adenopathy  Psychiatric/Behavorial: Negative for anxiety      SOCIAL HISTORY:   reports that he quit smoking about 31 years ago. His smoking use included cigarettes. He started smoking about 51 years ago. He has a 10 pack-year smoking history. He has never used smokeless tobacco.    PAST MEDICAL HISTORY:  Past Medical History:   Diagnosis Date    Atrial fibrillation (HCC)     Class 1 obesity     COVID-19     Hyperlipidemia     Hypertension     Osteoarthritis        PAST SURGICAL HISTORY:  Past Surgical History:   Procedure Laterality Date    ABLATION OF DYSRHYTHMIC FOCUS  2022    for atrial fibrillation treatment    APPENDECTOMY      COLONOSCOPY      KNEE

## 2025-03-12 NOTE — PATIENT INSTRUCTIONS
Sinus Rinse.  Do this at least twice daily   Augmentin (antibiotics for sinus) three times daily   ipratropium  nasal spray 2 sprays twice daily   Bromfed - as needed.    Erythromycin for eyes 4 times daily for 3-5 days.

## 2025-03-12 NOTE — ASSESSMENT & PLAN NOTE
Likely secondary to post nasal drip.  Will treat sinus.  CT chest Nov 2024 without pathology.  chest X-ray normal since as well.

## 2025-03-12 NOTE — ASSESSMENT & PLAN NOTE
Sinus congestion with likely bacteria sinusitis.     Sinus Rinse.  Do this at least twice daily   Augmentin (antibiotics for sinus) three times daily   ipratropium  nasal spray 2 sprays twice daily   Bromfed - as needed.

## 2025-03-14 ENCOUNTER — TELEPHONE (OUTPATIENT)
Dept: PULMONOLOGY | Age: 60
End: 2025-03-14

## 2025-03-14 NOTE — TELEPHONE ENCOUNTER
Pt called an said he wanted to say thanks for what you did for him he's starting to feel better eyes are clearing up also. Pt said not 100% yet, but much better from when he was seen at the office visit still taking his meds.

## 2025-03-25 ASSESSMENT — ENCOUNTER SYMPTOMS
EYE PAIN: 0
RHINORRHEA: 1
EYE DISCHARGE: 1

## 2025-04-01 ENCOUNTER — OFFICE VISIT (OUTPATIENT)
Dept: INTERNAL MEDICINE CLINIC | Age: 60
End: 2025-04-01
Payer: COMMERCIAL

## 2025-04-01 VITALS
WEIGHT: 232 LBS | HEART RATE: 90 BPM | SYSTOLIC BLOOD PRESSURE: 130 MMHG | OXYGEN SATURATION: 92 % | DIASTOLIC BLOOD PRESSURE: 82 MMHG | BODY MASS INDEX: 31.46 KG/M2

## 2025-04-01 DIAGNOSIS — J20.9 ACUTE BRONCHITIS, UNSPECIFIED ORGANISM: Primary | ICD-10-CM

## 2025-04-01 DIAGNOSIS — I10 PRIMARY HYPERTENSION: ICD-10-CM

## 2025-04-01 DIAGNOSIS — R05.3 CHRONIC COUGH: ICD-10-CM

## 2025-04-01 PROCEDURE — 3079F DIAST BP 80-89 MM HG: CPT | Performed by: STUDENT IN AN ORGANIZED HEALTH CARE EDUCATION/TRAINING PROGRAM

## 2025-04-01 PROCEDURE — 3017F COLORECTAL CA SCREEN DOC REV: CPT | Performed by: STUDENT IN AN ORGANIZED HEALTH CARE EDUCATION/TRAINING PROGRAM

## 2025-04-01 PROCEDURE — 99215 OFFICE O/P EST HI 40 MIN: CPT | Performed by: STUDENT IN AN ORGANIZED HEALTH CARE EDUCATION/TRAINING PROGRAM

## 2025-04-01 PROCEDURE — G2211 COMPLEX E/M VISIT ADD ON: HCPCS | Performed by: STUDENT IN AN ORGANIZED HEALTH CARE EDUCATION/TRAINING PROGRAM

## 2025-04-01 PROCEDURE — G8427 DOCREV CUR MEDS BY ELIG CLIN: HCPCS | Performed by: STUDENT IN AN ORGANIZED HEALTH CARE EDUCATION/TRAINING PROGRAM

## 2025-04-01 PROCEDURE — 1036F TOBACCO NON-USER: CPT | Performed by: STUDENT IN AN ORGANIZED HEALTH CARE EDUCATION/TRAINING PROGRAM

## 2025-04-01 PROCEDURE — 3075F SYST BP GE 130 - 139MM HG: CPT | Performed by: STUDENT IN AN ORGANIZED HEALTH CARE EDUCATION/TRAINING PROGRAM

## 2025-04-01 PROCEDURE — G8417 CALC BMI ABV UP PARAM F/U: HCPCS | Performed by: STUDENT IN AN ORGANIZED HEALTH CARE EDUCATION/TRAINING PROGRAM

## 2025-04-01 RX ORDER — CETIRIZINE HYDROCHLORIDE 10 MG/1
10 TABLET ORAL NIGHTLY
Qty: 30 TABLET | Refills: 1 | Status: SHIPPED | OUTPATIENT
Start: 2025-04-01 | End: 2025-04-01

## 2025-04-01 RX ORDER — METHYLPREDNISOLONE 4 MG/1
TABLET ORAL
Qty: 1 KIT | Refills: 0 | Status: SHIPPED | OUTPATIENT
Start: 2025-04-01 | End: 2025-04-01

## 2025-04-01 RX ORDER — VALSARTAN AND HYDROCHLOROTHIAZIDE 160; 12.5 MG/1; MG/1
1 TABLET, FILM COATED ORAL DAILY
Qty: 90 TABLET | Refills: 1 | Status: SHIPPED | OUTPATIENT
Start: 2025-04-01

## 2025-04-01 RX ORDER — DOXYCYCLINE HYCLATE 100 MG
100 TABLET ORAL 2 TIMES DAILY
Qty: 10 TABLET | Refills: 0 | Status: SHIPPED | OUTPATIENT
Start: 2025-04-01 | End: 2025-04-01

## 2025-04-01 RX ORDER — METHYLPREDNISOLONE 4 MG/1
TABLET ORAL
Qty: 1 KIT | Refills: 0 | Status: SHIPPED | OUTPATIENT
Start: 2025-04-01 | End: 2025-04-07

## 2025-04-01 RX ORDER — DOXYCYCLINE HYCLATE 100 MG
100 TABLET ORAL 2 TIMES DAILY
Qty: 10 TABLET | Refills: 0 | Status: SHIPPED | OUTPATIENT
Start: 2025-04-01 | End: 2025-04-06

## 2025-04-01 RX ORDER — VALSARTAN AND HYDROCHLOROTHIAZIDE 160; 12.5 MG/1; MG/1
1 TABLET, FILM COATED ORAL DAILY
Qty: 90 TABLET | Refills: 1 | Status: SHIPPED | OUTPATIENT
Start: 2025-04-01 | End: 2025-04-01

## 2025-04-01 RX ORDER — CETIRIZINE HYDROCHLORIDE 10 MG/1
10 TABLET ORAL NIGHTLY
Qty: 30 TABLET | Refills: 1 | Status: SHIPPED | OUTPATIENT
Start: 2025-04-01

## 2025-04-01 RX ORDER — VALSARTAN AND HYDROCHLOROTHIAZIDE 160; 12.5 MG/1; MG/1
1 TABLET, FILM COATED ORAL DAILY
Qty: 30 TABLET | Refills: 3 | Status: SHIPPED | OUTPATIENT
Start: 2025-04-01 | End: 2025-04-01

## 2025-04-01 SDOH — ECONOMIC STABILITY: FOOD INSECURITY: WITHIN THE PAST 12 MONTHS, THE FOOD YOU BOUGHT JUST DIDN'T LAST AND YOU DIDN'T HAVE MONEY TO GET MORE.: NEVER TRUE

## 2025-04-01 SDOH — ECONOMIC STABILITY: FOOD INSECURITY: WITHIN THE PAST 12 MONTHS, YOU WORRIED THAT YOUR FOOD WOULD RUN OUT BEFORE YOU GOT MONEY TO BUY MORE.: NEVER TRUE

## 2025-04-01 ASSESSMENT — ENCOUNTER SYMPTOMS
ALLERGIC/IMMUNOLOGIC NEGATIVE: 1
EYES NEGATIVE: 1
ABDOMINAL PAIN: 0
GASTROINTESTINAL NEGATIVE: 1
EYE DISCHARGE: 0
COUGH: 1
SHORTNESS OF BREATH: 0

## 2025-04-01 NOTE — TELEPHONE ENCOUNTER
Future Appointments   Date Time Provider Department Center   4/9/2025  8:40 AM Moshe Macario MD  PULM [facility]   4/16/2025  9:45 AM Paloma Huizar MD [facility]   5/7/2025  8:00 AM Giovany Plasencia, PhD [place] PSYCHOLOG [facility]       Last appt 4/1/25

## 2025-04-01 NOTE — ASSESSMENT & PLAN NOTE
Multifactorial  Switch lisinopril to Diovan.  Start Zyrtec nightly.    Orders:    cetirizine (ZYRTEC) 10 MG tablet; Take 1 tablet by mouth at bedtime

## 2025-04-01 NOTE — PROGRESS NOTES
Brigido Cottrell (:  1965) is a 59 y.o. 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 Reports takes only as needed.    Atrial fibrillation- s/p ablation.    Family h/o heart attack in father and mother.    H.p cervical pain- Cervical Radiofrequency Ablation - 2024. [facility].  He  aspirin Lipitor few days ago from his cardiologist. [id number] Score 1 (HTN)     intermittent short memory loss- saw neurologist. Avoid hypnotics, need to see sleep specialist.    A-fib status post ablation. On aspirin and statin     H/o nasal polyp.     When driving forgets where going to   Assessment & Plan  Acute bronchitis, unspecified organism   Acute condition, recurrent, start on antibiotic and steroid.    Orders:    doxycycline hyclate (VIBRA-TABS) 100 MG tablet; Take 1 tablet by mouth 2 times daily for 5 days    methylPREDNISolone (MEDROL DOSEPACK) 4 MG tablet; Take by mouth.    Chronic cough    Multifactorial  Switch lisinopril to Diovan.  Start Zyrtec nightly.    Orders:    cetirizine (ZYRTEC) 10 MG tablet; Take 1 tablet by mouth at bedtime    Primary hypertension   Chronic, at goal (stable), switch lisinopril to Diovan.           Return in about 2 weeks (around 4/15/2025) for HTN.       Subjective   Cough since November.  Has completed courses on antibitotics.      Cough  This is a chronic problem. Associated symptoms include headaches. Pertinent negatives include no chest pain, chills, fever or shortness of breath.   Headache      Review of Systems

## 2025-04-09 ENCOUNTER — RESULTS FOLLOW-UP (OUTPATIENT)
Dept: PULMONOLOGY | Age: 60
End: 2025-04-09

## 2025-04-09 ENCOUNTER — OFFICE VISIT (OUTPATIENT)
Dept: PULMONOLOGY | Age: 60
End: 2025-04-09
Payer: COMMERCIAL

## 2025-04-09 VITALS
OXYGEN SATURATION: 96 % | HEIGHT: 72 IN | WEIGHT: 233.8 LBS | BODY MASS INDEX: 31.67 KG/M2 | TEMPERATURE: 97.6 F | SYSTOLIC BLOOD PRESSURE: 126 MMHG | DIASTOLIC BLOOD PRESSURE: 84 MMHG | RESPIRATION RATE: 18 BRPM | HEART RATE: 90 BPM

## 2025-04-09 DIAGNOSIS — J45.40 MODERATE PERSISTENT ASTHMA WITHOUT COMPLICATION: ICD-10-CM

## 2025-04-09 DIAGNOSIS — R05.3 CHRONIC COUGH: ICD-10-CM

## 2025-04-09 DIAGNOSIS — R05.3 CHRONIC COUGH: Primary | ICD-10-CM

## 2025-04-09 LAB
BASOPHILS # BLD: 0.1 K/UL (ref 0–0.2)
BASOPHILS NFR BLD: 0.6 %
DEPRECATED RDW RBC AUTO: 14.5 % (ref 12.4–15.4)
EOSINOPHIL # BLD: 0.2 K/UL (ref 0–0.6)
EOSINOPHIL NFR BLD: 2.4 %
HCT VFR BLD AUTO: 49.4 % (ref 40.5–52.5)
HGB BLD-MCNC: 16.9 G/DL (ref 13.5–17.5)
LYMPHOCYTES # BLD: 2.2 K/UL (ref 1–5.1)
LYMPHOCYTES NFR BLD: 22.1 %
MCH RBC QN AUTO: 28.6 PG (ref 26–34)
MCHC RBC AUTO-ENTMCNC: 34.2 G/DL (ref 31–36)
MCV RBC AUTO: 83.6 FL (ref 80–100)
MONOCYTES # BLD: 0.8 K/UL (ref 0–1.3)
MONOCYTES NFR BLD: 8.4 %
NEUTROPHILS # BLD: 6.7 K/UL (ref 1.7–7.7)
NEUTROPHILS NFR BLD: 66.5 %
PLATELET # BLD AUTO: 277 K/UL (ref 135–450)
PMV BLD AUTO: 8.1 FL (ref 5–10.5)
RBC # BLD AUTO: 5.91 M/UL (ref 4.2–5.9)
WBC # BLD AUTO: 10 K/UL (ref 4–11)

## 2025-04-09 PROCEDURE — 3079F DIAST BP 80-89 MM HG: CPT | Performed by: INTERNAL MEDICINE

## 2025-04-09 PROCEDURE — 1036F TOBACCO NON-USER: CPT | Performed by: INTERNAL MEDICINE

## 2025-04-09 PROCEDURE — 99214 OFFICE O/P EST MOD 30 MIN: CPT | Performed by: INTERNAL MEDICINE

## 2025-04-09 PROCEDURE — 3017F COLORECTAL CA SCREEN DOC REV: CPT | Performed by: INTERNAL MEDICINE

## 2025-04-09 PROCEDURE — G8427 DOCREV CUR MEDS BY ELIG CLIN: HCPCS | Performed by: INTERNAL MEDICINE

## 2025-04-09 PROCEDURE — G8417 CALC BMI ABV UP PARAM F/U: HCPCS | Performed by: INTERNAL MEDICINE

## 2025-04-09 PROCEDURE — 3074F SYST BP LT 130 MM HG: CPT | Performed by: INTERNAL MEDICINE

## 2025-04-09 RX ORDER — DOXYCYCLINE HYCLATE 100 MG
100 TABLET ORAL 2 TIMES DAILY
Qty: 14 TABLET | Refills: 0 | Status: SHIPPED | OUTPATIENT
Start: 2025-04-09 | End: 2025-04-16

## 2025-04-09 RX ORDER — PREDNISONE 20 MG/1
20 TABLET ORAL DAILY
Qty: 5 TABLET | Refills: 0 | Status: SHIPPED | OUTPATIENT
Start: 2025-04-09 | End: 2025-04-14

## 2025-04-09 NOTE — ASSESSMENT & PLAN NOTE
Significant eosinophilia noted and increased sharply after getting a dog.      Discussed.     Using Advair daily instead of bid. Will step up to bid.     Assess eosinophilia with CBC.   Order respiratory allergen profile

## 2025-04-09 NOTE — ASSESSMENT & PLAN NOTE
He is concerned he has infection form his dog.  chest X-ray without acute disease.      Assess WBC on CBC.      Given another round of antibiotics.

## 2025-04-09 NOTE — PROGRESS NOTES
note was transcribed using Dragon Dictation software. Please disregard any translational errors.    ALEXEY DAVIS   [facility]  [phone]

## 2025-04-14 ENCOUNTER — TELEPHONE (OUTPATIENT)
Dept: CARDIOLOGY CLINIC | Age: 60
End: 2025-04-14

## 2025-04-14 LAB
A ALTERNATA IGE QN: 14.6 KU/L (ref 0–0.34)
A FUMIGATUS IGE QN: 0.53 KU/L (ref 0–0.34)
AMER SYCAMORE IGE QN: 0.1 KU/L (ref 0–0.34)
BERMUDA GRASS IGE QN: 0.27 KU/L (ref 0–0.34)
BOXELDER IGE QN: 0.57 KU/L (ref 0–0.34)
C SPHAEROSPERMUM IGE QN: <0.1 KUL/L (ref 0–0.34)
CALIF WALNUT IGE QN: 0.3 KU/L (ref 0–0.34)
CAT DANDER IGE QN: <0.1 KU/L (ref 0–0.34)
CMN PIGWEED IGE QN: <0.1 KU/L (ref 0–0.34)
COMMON RAGWEED IGE QN: 2.81 KU/L (ref 0–0.34)
COTTONWOOD IGE QN: <0.1 KU/L (ref 0–0.34)
D FARINAE IGE QN: 0.73 KU/L (ref 0–0.34)
D PTERONYSS IGE QN: 1.76 KU/L (ref 0–0.34)
DOG DANDER IGE QN: 0.12 KU/L (ref 0–0.34)
IGE SERPL-ACNC: 918 IU/ML (ref 0–100)
M RACEMOSUS IGE QN: <0.1 KU/L (ref 0–0.34)
MOUSE EPITH IGE QN: 0.9 KU/L (ref 0–0.34)
P NOTATUM IGE QN: <0.1 KU/L (ref 0–0.34)
PECAN/HICK TREE IGE QN: 0.12 KU/L (ref 0–0.34)
RED CEDAR IGE QN: 0.12 KU/L (ref 0–0.34)
ROACH IGE QN: 0.26 KU/L (ref 0–0.34)
SALTWORT IGE QN: 0.27 KU/L (ref 0–0.34)
SHEEP SORREL IGE QN: <0.1 KU/L (ref 0–0.34)
SILVER BIRCH IGE QN: 0.1 KU/L (ref 0–0.34)
TIMOTHY IGE QN: 0.41 KU/L (ref 0–0.34)
WHITE ASH IGE QN: <0.1 KU/L (ref 0–0.34)
WHITE ELM IGE QN: <0.1 KU/L (ref 0–0.34)
WHITE MULBERRY IGE QN: <0.1 KU/L (ref 0–0.34)
WHITE OAK IGE QN: <0.1 KU/L (ref 0–0.34)

## 2025-04-14 NOTE — TELEPHONE ENCOUNTER
Patient last seen by Dr. Castro 9/2023, was to follow up in 1 year.  Echo 2/2023 showed normal heart function.   Patient being follows with pulmonary for chronic cough.   Date/time okay.  Can offer patient earlier date should he develop any new or worsening cardiac complaints.   Thanks

## 2025-04-14 NOTE — TELEPHONE ENCOUNTER
Brigido called to make follow up appt. He shares he has been sick for the past 6 months with an \"unknown organism\". He states he has been to the ER, seen PCP and also pulmonology for this and Brigido thinks he may have a poss heart valve issue on the right side, effecting the lung. He wants to make sure this isn't heart related.     Scheduled appt for 6/20 at 8:30a. Want to make sure appropriate?    Brigido's callback: [phone]

## 2025-04-15 NOTE — PROGRESS NOTES
After finishing antibioics, was worried about his back and worried about his kidneys.    Brigido Cottrell (:  1965) is a 59 y.o. male,New patient, here for evaluation of the following chief complaint(s):  Hypertension (2 wk follow up)      Past medical history significant for HTN, chronic right knee pain, RLS, ARIADNE, atrial fibrillation status post ablation, nasal polyp    RLS- ropinirole as needed    HTN- lisinopril, , diltiazem and HCTZ      s/p right Total knee arthroplasty 24, s/p right knee manipulation under anesthesia and injection on 24     Takes gabapentin- neuropathy in right foot. Reports takes only as needed.    Atrial fibrillation- s/p ablation.    Family h/o heart attack in father and mother.    H.p cervical pain- Cervical Radiofrequency Ablation - 2024. [facility].  He  aspirin Lipitor few days ago from his cardiologist. [id number] Score 1 (HTN)     intermittent short memory loss- saw neurologist. Avoid hypnotics, need to see sleep specialist.    A-fib status post ablation. On aspirin and statin     H/o nasal polyp.     When driving forgets where going to   Assessment & Plan  Moderate episode of recurrent major depressive disorder (HCC)  New, worsening  Start on Zoloft daily  Has schedule an appointment to see psychologist.    Orders:    sertraline (ZOLOFT) 25 MG tablet; Take 1 tablet by mouth daily    Strain of lumbar region, initial encounter   Acute condition, new, encouraged to take Tylenol and alternate with tizanidine as needed for spasm.  Educated on stretching exercise.  Orders:    tiZANidine (ZANAFLEX) 2 MG tablet; Take 1 tablet by mouth nightly as needed (muscle spasm)    Primary hypertension   Chronic, at goal (stable), continue current treatment plan    Orders:    Basic Metabolic Panel; Future    Moderate persistent asthma without complication   Monitored by specialist- no acute findings meriting change in the plan    Orders:    Basic Metabolic Panel;

## 2025-04-16 ENCOUNTER — RESULTS FOLLOW-UP (OUTPATIENT)
Dept: INTERNAL MEDICINE CLINIC | Age: 60
End: 2025-04-16

## 2025-04-16 ENCOUNTER — OFFICE VISIT (OUTPATIENT)
Dept: INTERNAL MEDICINE CLINIC | Age: 60
End: 2025-04-16
Payer: COMMERCIAL

## 2025-04-16 VITALS
HEART RATE: 71 BPM | BODY MASS INDEX: 31.74 KG/M2 | OXYGEN SATURATION: 94 % | DIASTOLIC BLOOD PRESSURE: 82 MMHG | WEIGHT: 234 LBS | SYSTOLIC BLOOD PRESSURE: 122 MMHG

## 2025-04-16 DIAGNOSIS — R10.9 ACUTE LEFT FLANK PAIN: ICD-10-CM

## 2025-04-16 DIAGNOSIS — I10 PRIMARY HYPERTENSION: ICD-10-CM

## 2025-04-16 DIAGNOSIS — S39.012A STRAIN OF LUMBAR REGION, INITIAL ENCOUNTER: ICD-10-CM

## 2025-04-16 DIAGNOSIS — F33.1 MODERATE EPISODE OF RECURRENT MAJOR DEPRESSIVE DISORDER (HCC): Primary | ICD-10-CM

## 2025-04-16 DIAGNOSIS — J45.40 MODERATE PERSISTENT ASTHMA WITHOUT COMPLICATION: ICD-10-CM

## 2025-04-16 LAB
ANION GAP SERPL CALCULATED.3IONS-SCNC: 11 MMOL/L (ref 3–16)
BACTERIA URINE, POC: NORMAL
BILIRUBIN URINE: NORMAL
BLOOD, URINE: NEGATIVE
BUN SERPL-MCNC: 14 MG/DL (ref 7–20)
CALCIUM SERPL-MCNC: 8.8 MG/DL (ref 8.3–10.6)
CASTS URINE, POC: NORMAL
CHLORIDE SERPL-SCNC: 104 MMOL/L (ref 99–110)
CLARITY, UA: CLEAR
CO2 SERPL-SCNC: 24 MMOL/L (ref 21–32)
COLOR, UA: YELLOW
CREAT SERPL-MCNC: 1.1 MG/DL (ref 0.8–1.3)
CRYSTALS URINE, POC: NORMAL
EPI CELLS URINE, POC: NORMAL
GFR SERPLBLD CREATININE-BSD FMLA CKD-EPI: 76 ML/MIN/{1.73_M2}
GLUCOSE SERPL-MCNC: 102 MG/DL (ref 70–99)
GLUCOSE URINE: NORMAL
KETONES, URINE: NEGATIVE
LEUKOCYTE EST, POC: NORMAL
NITRITE, URINE: NEGATIVE
PH UA: 6 (ref 4.5–8)
POTASSIUM SERPL-SCNC: 4.1 MMOL/L (ref 3.5–5.1)
PROTEIN UA: NEGATIVE
RBC URINE, POC: NORMAL
SODIUM SERPL-SCNC: 139 MMOL/L (ref 136–145)
SPECIFIC GRAVITY UA: 1.02 (ref 1–1.03)
UROBILINOGEN, URINE: NORMAL
WBC URINE, POC: NORMAL
YEAST URINE, POC: NORMAL

## 2025-04-16 PROCEDURE — 3074F SYST BP LT 130 MM HG: CPT | Performed by: STUDENT IN AN ORGANIZED HEALTH CARE EDUCATION/TRAINING PROGRAM

## 2025-04-16 PROCEDURE — G8427 DOCREV CUR MEDS BY ELIG CLIN: HCPCS | Performed by: STUDENT IN AN ORGANIZED HEALTH CARE EDUCATION/TRAINING PROGRAM

## 2025-04-16 PROCEDURE — 1036F TOBACCO NON-USER: CPT | Performed by: STUDENT IN AN ORGANIZED HEALTH CARE EDUCATION/TRAINING PROGRAM

## 2025-04-16 PROCEDURE — G8417 CALC BMI ABV UP PARAM F/U: HCPCS | Performed by: STUDENT IN AN ORGANIZED HEALTH CARE EDUCATION/TRAINING PROGRAM

## 2025-04-16 PROCEDURE — 36415 COLL VENOUS BLD VENIPUNCTURE: CPT | Performed by: STUDENT IN AN ORGANIZED HEALTH CARE EDUCATION/TRAINING PROGRAM

## 2025-04-16 PROCEDURE — 3079F DIAST BP 80-89 MM HG: CPT | Performed by: STUDENT IN AN ORGANIZED HEALTH CARE EDUCATION/TRAINING PROGRAM

## 2025-04-16 PROCEDURE — 3017F COLORECTAL CA SCREEN DOC REV: CPT | Performed by: STUDENT IN AN ORGANIZED HEALTH CARE EDUCATION/TRAINING PROGRAM

## 2025-04-16 PROCEDURE — G2211 COMPLEX E/M VISIT ADD ON: HCPCS | Performed by: STUDENT IN AN ORGANIZED HEALTH CARE EDUCATION/TRAINING PROGRAM

## 2025-04-16 PROCEDURE — 81000 URINALYSIS NONAUTO W/SCOPE: CPT | Performed by: STUDENT IN AN ORGANIZED HEALTH CARE EDUCATION/TRAINING PROGRAM

## 2025-04-16 PROCEDURE — 99214 OFFICE O/P EST MOD 30 MIN: CPT | Performed by: STUDENT IN AN ORGANIZED HEALTH CARE EDUCATION/TRAINING PROGRAM

## 2025-04-16 RX ORDER — TIZANIDINE 2 MG/1
2 TABLET ORAL NIGHTLY PRN
Qty: 10 TABLET | Refills: 0 | Status: SHIPPED | OUTPATIENT
Start: 2025-04-16 | End: 2025-04-16

## 2025-04-16 RX ORDER — TIZANIDINE 2 MG/1
2 TABLET ORAL NIGHTLY PRN
Qty: 10 TABLET | Refills: 0 | Status: SHIPPED | OUTPATIENT
Start: 2025-04-16

## 2025-04-16 RX ORDER — SERTRALINE HYDROCHLORIDE 25 MG/1
25 TABLET, FILM COATED ORAL DAILY
Qty: 90 TABLET | Refills: 0 | Status: SHIPPED | OUTPATIENT
Start: 2025-04-16

## 2025-04-16 RX ORDER — SERTRALINE HYDROCHLORIDE 25 MG/1
25 TABLET, FILM COATED ORAL DAILY
Qty: 90 TABLET | Refills: 0 | Status: SHIPPED | OUTPATIENT
Start: 2025-04-16 | End: 2025-04-16

## 2025-04-16 NOTE — ASSESSMENT & PLAN NOTE
Chronic, at goal (stable), continue current treatment plan    Orders:    Basic Metabolic Panel; Future

## 2025-04-16 NOTE — ASSESSMENT & PLAN NOTE
Monitored by specialist- no acute findings meriting change in the plan    Orders:    Basic Metabolic Panel; Future

## 2025-04-30 NOTE — PROGRESS NOTES
[facility] PRE-SURGICAL TESTING INSTRUCTIONS                      PRIOR TO PROCEDURE DATE:    1. PLEASE FOLLOW ANY INSTRUCTIONS GIVEN TO YOU PER YOUR SURGEON.      2. Arrange for someone to drive you home and be with you for the first 24 hours after discharge for your safety after your procedure for which you received sedation. Ensure it is someone we can share information with regarding your discharge.     NOTE: At this time ONLY 2 ADULTS may accompany you   One person ENCOURAGED to stay at hospital entire time if outpatient surgery      3. You must contact your surgeon for instructions IF:  You are taking any blood thinners, aspirin, anti-inflammatory or vitamins.  There is a change in your physical condition such as a cold, fever, rash, cuts, sores, or any other infection, especially near your surgical site.    4. Do not drink alcohol the day before or day of your procedure.  Do not use any recreational marijuana at least 24 hours or street drugs (heroin, cocaine) at minimum 5 days prior to your procedure.     5. A Pre-Surgical History and Physical MUST be completed WITHIN 30 DAYS OR LESS prior to your procedure.by your Physician or an Urgent Care        THE DAY OF YOUR PROCEDURE:  1.  Follow instructions for ARRIVAL TIME as DIRECTED BY YOUR SURGEON.     2. Enter the MAIN entrance from [facility] and follow the signs to the free Parking Garage or  Parking (offered free of charge 7 am-5pm).      3. Enter the Main Entrance of the hospital (do not enter from the lower level of the parking garage). Upon entrance, check in with the  at the surgical information desk on your LEFT.   Bring your insurance card and photo ID to register      4. DO NOT EAT ANYTHING 8 hours prior to arrival for surgery.  You may have up to 8 ounces of water 4 hours prior to your arrival for surgery.   NOTE: ALL Gastric, Bariatric & Bowel surgery patients - you MUST follow your surgeon's instructions regarding 
24 @ 1609 Left VM for pt to return call to PAT# given to review meds/health hx/PAT instructions. Total Joint video emailed & reviewed to patient by Kelly MORENO on 24. TJ book, IS instructions, TJ video link, and fall contract placed on chart for DOS. MD    24 @ 5711 Left second VM for pt to return call to PAT# given. Spoke with [facility], after confirming pt name/ requests a call back later today  after 1pm. MD    24 @ 1330 Spoke with [facility] and requests a call back 'after 330 because pt napping'. MD    24 @ 9500 Hibiclens instructions reviewed to use x 5 days preop. ARIADNE screening done. MD  
Arrived for surgery see consent               H/P 4-17-24 needs up-date  
Patient admitted to PACU # 13 from OR at 1345 post RIGHT TOTAL KNEE ARTHROPLASTY - Right  per Brandon Garcia MD .  Attached to PACU monitoring system and report received from anesthesia provider.  Patient was reported to be hemodynamically stable during procedure.  Patient drowsy on admission.  Pt arrived to PACU with ace wrap from right hip to right foot.  No drains.  Ice pack applied to R Knee.  
Physical Therapy  Facility/Department: [facility] GENERAL SURGERY  Physical Therapy Initial Assessment/discharge note    Name: Brigido Cottrell  : 1965  MRN: [id number]  Date of Service: 2024    Discharge Recommendations:  24 hour supervision or assist, Outpatient PT   PT Equipment Recommendations  Equipment Needed: No      Patient Diagnosis(es): The encounter diagnosis was Primary osteoarthritis of left knee.  Past Medical History:  has a past medical history of Arthritis, COVID-19, Hyperlipidemia, Hypertension, Medial meniscus tear, Persistent atrial fibrillation (HCC), Pneumonia, and Syncope and collapse.  Past Surgical History:  has a past surgical history that includes Appendectomy; Mandible surgery; Colonoscopy; Knee arthroscopy (Right, 2022); and Sinus endoscopy (Right, 10/3/2023).    Assessment   Assessment: 57yo seen in PACU for R TKR. Pt demo mobility below his reported baseline of independent at home with use of cane requiring min/CGA and use of RW this session. Pt impulsive at times and some decreased insight into his current limitations. Pt plans to return home today with assist per wife. Recommend pt return home with close 24 hour assist initially, use of RW at all times for safety. Pt states he will pursue OP PT at discharge. Discussed concerns with pt about only BR being 17 steps up on second level of home and urinal issued with recommendation that he stay downstairs initially for safety. He verbalized understanding.  Treatment Diagnosis: mobility impairment due to R TKR  Decision Making: Medium Complexity  Requires PT Follow-Up: No  Activity Tolerance  Activity Tolerance: Patient tolerated evaluation without incident;Patient limited by endurance;Patient limited by pain     Plan   Safety Devices  Type of Devices: Left in bed, Call light within reach, Nurse notified     Restrictions  Position Activity Restriction  Other position/activity restrictions: up with assist, WBAT R     Subjective 
Pt a/o x 4, VSS on room air.  Pt tolerating po fluids and PB crackers.  Pt given time to speak to family on phone.  Family updated on POC by this RN.      PO pain medication given.  PT/OT at bedside in PACU 13 to assess patient.   
Total Joint Same Day Readiness Screen  PAT Questionnaire    Does patient have at least one day of 24 hr assist of capable caregiver at d/c?  [x] Yes = 0  [] No = 2      Was patient using an assistive device to walk prior to surgery?  [] No = 0  [x] Yes = 1    How many steps do you have to get to the floor where you plan to initially sleep and use the restroom? (At least 1/2 bath)  [] 0-2 steps= 0 [x] 3+ steps = 1    Has patient fallen in the last 3 months? If yes, how many times?  [x] 0 falls = 0 [] 1 fall = 1     [] 2+ falls = 2    Does patient have a hx of post-op nausea/vomiting?  [x] No = 0 [] Yes = 2    Other Factors    Age  [x] <70 = 0 [] 71-79 = 1  [] 80+ = 2    BMI  [] <30 = 0       [x]31-39 = 1    [] >40 = 2    Co-morbidities  [] 0 = 0           [x] 1-2 = 1       [] 3+ = 2    Sleep apnea  [x] No = 0         [] Yes = 1    Hx of prolonged emergence from general anesthesia  [x] No = 0         [] Yes = 1      Score: 4      Interpretation:  Red (10-16): Low probability of safe same day discharge  Yellow (6-9): Moderate probability of safe same day discharge  Green (0-5): High probability of safe same day discharge    Score completed by: GABRIELE Vidales, OTR/L [id number]    
rehabilitation services?: Yes  Additional Pertinent Hx: 59 y/o M s/p RIGHT TOTAL KNEE ARTHROPLASTY  Referring Practitioner: Brandon Garcia MD  Diagnosis: primary osteoarthritis of left knee  Subjective  Subjective: Pt in stretcher upon arrival, pleasant and agreeable to OT eval and treat. pt reporting 6/10 pain- RN aware     Social/Functional History  Social/Functional History  Lives With: Significant other  Type of Home: House  Home Layout: Two level, Bed/Bath upstairs (17 steps to second floor. no bathroom on first floor)  Home Access: Stairs to enter with rails  Entrance Stairs - Number of Steps: 5 belkis w/ hand rail  Bathroom Shower/Tub: Tub/Shower unit (clawfoot tub)  Bathroom Toilet: Standard ([facility] for leverage)  Bathroom Equipment: Grab bars in shower, Shower chair, Hand-held shower  Home Equipment: Cane, Rollator, Walker, rolling (2 canes, sleeps in regular bed)  Has the patient had two or more falls in the past year or any fall with injury in the past year?: No  ADL Assistance: Independent  Homemaking Assistance: Needs assistance (splits with SO)  Ambulation Assistance: Independent (uses cane on the time)  Transfer Assistance: Independent  Active : Yes  Occupation: Retired  Type of Occupation:   Additional Comments: SO is there to provide 24hr A       Objective   Temp: 97.8 °F (36.6 °C)  Pulse: 70  Heart Rate Source: Monitor  Respirations: 16  SpO2: 96 %  O2 Device: None (Room air)  BP: 110/70  MAP (Calculated): 83  BP Location: Left upper arm  BP Method: Automatic             Safety Devices  Type of Devices: Left in bed;Call light within reach;Nurse notified  Balance  Sitting: Intact  Standing: With support (CGA-SBA w/ RW)  Gait  Gait Training: Yes  Overall Level of Assistance: Contact-guard assistance (Pt demo functional mobility w/ RW w/ CGA)  Assistive Device: Walker, rolling;Gait belt  Rail Use: Both  Stairs - Level of Assistance: Contact-guard assistance  Number of Stairs 
Hpi Title: Evaluation of Skin Lesions

## 2025-05-07 ENCOUNTER — TELEMEDICINE (OUTPATIENT)
Dept: INTERNAL MEDICINE CLINIC | Age: 60
End: 2025-05-07
Payer: COMMERCIAL

## 2025-05-07 DIAGNOSIS — F33.1 MODERATE EPISODE OF RECURRENT MAJOR DEPRESSIVE DISORDER (HCC): Primary | ICD-10-CM

## 2025-05-07 PROCEDURE — 98012 SYNCH AUDIO-ONLY EST SF 10: CPT | Performed by: STUDENT IN AN ORGANIZED HEALTH CARE EDUCATION/TRAINING PROGRAM

## 2025-05-07 NOTE — PROGRESS NOTES
Brigido Cottrell (:  1965) is a 59 y.o. male,New patient, here for evaluation of the following chief complaint(s):  Medication Adjustment      Past medical history significant for HTN, chronic right knee pain, RLS, ARIADNE, atrial fibrillation status post ablation, nasal polyp    RLS- ropinirole as needed    HTN- lisinopril, , diltiazem and HCTZ      s/p right Total knee arthroplasty 24, s/p right knee manipulation under anesthesia and injection on 24     Takes gabapentin- neuropathy in right foot. Reports takes only as needed.    Atrial fibrillation- s/p ablation.    Family h/o heart attack in father and mother.    H.p cervical pain- Cervical Radiofrequency Ablation - 2024. [facility].  He  aspirin Lipitor few days ago from his cardiologist. [id number] Score 1 (HTN)     intermittent short memory loss- saw neurologist. Avoid hypnotics, need to see sleep specialist.    A-fib status post ablation. On aspirin and statin     H/o nasal polyp.       Assessment & Plan  Moderate episode of recurrent major depressive disorder (HCC)    Chronic, improving  Has started seeing behavioral therapy.  Increase the dose of Zoloft to 50 mg daily.  Orders:    sertraline (ZOLOFT) 50 MG tablet; Take 1 tablet by mouth daily       Return if symptoms worsen or fail to improve.       Subjective   For more than 6 months have been feeling depressed and angry.    Depression  Visit Type: follow-up  Patient presents with the following symptoms: depressed mood, fatigue, irritability and nervousness/anxiety.  Patient is not experiencing: excessive worry.   Severity: moderate   Sleep quality: good        Review of Systems   Constitutional:  Positive for irritability.   Musculoskeletal:         Right knee pain, h/o surgey   Psychiatric/Behavioral:  The patient is nervous/anxious.         Decreased memory            Brigido Cottrell was evaluated through a synchronous (real-time) audio encounter. Patient

## 2025-06-03 DIAGNOSIS — R05.9 COUGH, UNSPECIFIED TYPE: ICD-10-CM

## 2025-06-03 RX ORDER — FLUTICASONE PROPIONATE AND SALMETEROL 100; 50 UG/1; UG/1
POWDER RESPIRATORY (INHALATION)
Qty: 60 EACH | Refills: 1 | Status: SHIPPED | OUTPATIENT
Start: 2025-06-03

## 2025-06-03 NOTE — TELEPHONE ENCOUNTER
Future Appointments   Date Time Provider Department Center   6/17/2025  9:30 AM Paloma Huizar MD [facility]   6/18/2025  9:30 AM Giovany Plasencia, PhD [place] PSYCHOLOG [facility]   6/20/2025  8:30 AM Silvino Castro MD [facility]   7/28/2025  8:40 AM Moshe Macario MD [facility]       Last appt 5/7/25

## 2025-06-09 DIAGNOSIS — E55.9 VITAMIN D DEFICIENCY: ICD-10-CM

## 2025-06-10 RX ORDER — ERGOCALCIFEROL 1.25 MG/1
CAPSULE, LIQUID FILLED ORAL
Qty: 12 CAPSULE | Refills: 1 | Status: SHIPPED | OUTPATIENT
Start: 2025-06-10

## 2025-06-10 NOTE — TELEPHONE ENCOUNTER
Future Appointments   Date Time Provider Department Center   6/17/2025  9:30 AM Paloma Huizar MD [facility]   6/18/2025  9:30 AM Giovany Plasencia, PhD [place] PSYCHOLOG [facility]   6/20/2025  8:30 AM Silvino Castro MD [facility]   7/28/2025  8:40 AM Moshe Macario MD [facility]

## 2025-06-16 NOTE — PROGRESS NOTES
Brigido Cottrell (:  1965) is a 60 y.o. male,here for evaluation of the following chief complaint(s):  Depression      Past medical history significant for HTN, chronic right knee pain, RLS, ARIADNE, atrial fibrillation status post ablation, nasal polyp    RLS- ropinirole as needed    HTN- lisinopril, , diltiazem and HCTZ      s/p right Total knee arthroplasty 24, s/p right knee manipulation under anesthesia and injection on 24     Takes gabapentin- neuropathy in right foot. Reports takes only as needed.    Atrial fibrillation- s/p ablation. Follows cardiologist, currently on Lipitor and aspirin.    Family h/o heart attack in father and mother.    H.p cervical pain- Cervical Radiofrequency Ablation - 2024. [facility].        intermittent short memory loss- saw neurologist. Avoid hypnotics, need to see sleep specialist.    A-fib status post ablation. On aspirin and statin     H/o nasal polyp.       Assessment & Plan  Moderate episode of recurrent major depressive disorder (HCC)   Chronic, not at goal (unstable), improving, changes made today: Increase the dose of Zoloft to 75 mg daily.    Orders:    sertraline (ZOLOFT) 50 MG tablet; Take 1.5 tablets by mouth daily    Primary hypertension   Chronic, at goal (stable), continue current treatment plan         Paroxysmal atrial fibrillation (HCC)   Monitored by specialist- no acute findings meriting change in the plan         Acute pain of left knee   Acute condition, new, patient has schedule an appointment to see orthopedic today.  Reports has not tolerated Tylenol in the past due to nausea.  Does well with ibuprofen, prescription sent to be used as needed  Orders:    ibuprofen (ADVIL;MOTRIN) 800 MG tablet; Take 1 tablet by mouth every 8 hours as needed for Pain       Return in about 3 months (around 2025).       Subjective   Depression  Visit Type: follow-up  Patient presents with the following symptoms: depressed mood and

## 2025-06-17 ENCOUNTER — OFFICE VISIT (OUTPATIENT)
Dept: INTERNAL MEDICINE CLINIC | Age: 60
End: 2025-06-17
Payer: COMMERCIAL

## 2025-06-17 ENCOUNTER — OFFICE VISIT (OUTPATIENT)
Dept: ORTHOPEDIC SURGERY | Age: 60
End: 2025-06-17
Payer: COMMERCIAL

## 2025-06-17 VITALS
SYSTOLIC BLOOD PRESSURE: 130 MMHG | BODY MASS INDEX: 32.41 KG/M2 | HEART RATE: 74 BPM | DIASTOLIC BLOOD PRESSURE: 80 MMHG | WEIGHT: 239 LBS | OXYGEN SATURATION: 95 %

## 2025-06-17 VITALS — BODY MASS INDEX: 32.37 KG/M2 | WEIGHT: 239 LBS | HEIGHT: 72 IN

## 2025-06-17 DIAGNOSIS — M25.562 ACUTE PAIN OF LEFT KNEE: ICD-10-CM

## 2025-06-17 DIAGNOSIS — I10 PRIMARY HYPERTENSION: ICD-10-CM

## 2025-06-17 DIAGNOSIS — S83.242S TEAR OF MEDIAL MENISCUS OF LEFT KNEE, UNSPECIFIED TEAR TYPE, UNSPECIFIED WHETHER OLD OR CURRENT TEAR, SEQUELA: ICD-10-CM

## 2025-06-17 DIAGNOSIS — I48.0 PAROXYSMAL ATRIAL FIBRILLATION (HCC): ICD-10-CM

## 2025-06-17 DIAGNOSIS — G89.29 CHRONIC PAIN OF LEFT KNEE: Primary | ICD-10-CM

## 2025-06-17 DIAGNOSIS — F33.1 MODERATE EPISODE OF RECURRENT MAJOR DEPRESSIVE DISORDER (HCC): Primary | ICD-10-CM

## 2025-06-17 DIAGNOSIS — M25.562 CHRONIC PAIN OF LEFT KNEE: Primary | ICD-10-CM

## 2025-06-17 PROCEDURE — 1036F TOBACCO NON-USER: CPT | Performed by: STUDENT IN AN ORGANIZED HEALTH CARE EDUCATION/TRAINING PROGRAM

## 2025-06-17 PROCEDURE — 3079F DIAST BP 80-89 MM HG: CPT | Performed by: STUDENT IN AN ORGANIZED HEALTH CARE EDUCATION/TRAINING PROGRAM

## 2025-06-17 PROCEDURE — 3017F COLORECTAL CA SCREEN DOC REV: CPT | Performed by: PHYSICIAN ASSISTANT

## 2025-06-17 PROCEDURE — 3075F SYST BP GE 130 - 139MM HG: CPT | Performed by: STUDENT IN AN ORGANIZED HEALTH CARE EDUCATION/TRAINING PROGRAM

## 2025-06-17 PROCEDURE — G2211 COMPLEX E/M VISIT ADD ON: HCPCS | Performed by: STUDENT IN AN ORGANIZED HEALTH CARE EDUCATION/TRAINING PROGRAM

## 2025-06-17 PROCEDURE — 99202 OFFICE O/P NEW SF 15 MIN: CPT | Performed by: PHYSICIAN ASSISTANT

## 2025-06-17 PROCEDURE — G8417 CALC BMI ABV UP PARAM F/U: HCPCS | Performed by: STUDENT IN AN ORGANIZED HEALTH CARE EDUCATION/TRAINING PROGRAM

## 2025-06-17 PROCEDURE — G8427 DOCREV CUR MEDS BY ELIG CLIN: HCPCS | Performed by: PHYSICIAN ASSISTANT

## 2025-06-17 PROCEDURE — 20610 DRAIN/INJ JOINT/BURSA W/O US: CPT | Performed by: PHYSICIAN ASSISTANT

## 2025-06-17 PROCEDURE — 99214 OFFICE O/P EST MOD 30 MIN: CPT | Performed by: STUDENT IN AN ORGANIZED HEALTH CARE EDUCATION/TRAINING PROGRAM

## 2025-06-17 PROCEDURE — G8427 DOCREV CUR MEDS BY ELIG CLIN: HCPCS | Performed by: STUDENT IN AN ORGANIZED HEALTH CARE EDUCATION/TRAINING PROGRAM

## 2025-06-17 PROCEDURE — G8417 CALC BMI ABV UP PARAM F/U: HCPCS | Performed by: PHYSICIAN ASSISTANT

## 2025-06-17 PROCEDURE — 1036F TOBACCO NON-USER: CPT | Performed by: PHYSICIAN ASSISTANT

## 2025-06-17 PROCEDURE — 3017F COLORECTAL CA SCREEN DOC REV: CPT | Performed by: STUDENT IN AN ORGANIZED HEALTH CARE EDUCATION/TRAINING PROGRAM

## 2025-06-17 RX ORDER — TRIAMCINOLONE ACETONIDE 40 MG/ML
40 INJECTION, SUSPENSION INTRA-ARTICULAR; INTRAMUSCULAR ONCE
Status: COMPLETED | OUTPATIENT
Start: 2025-06-17 | End: 2025-06-17

## 2025-06-17 RX ORDER — IBUPROFEN 800 MG/1
800 TABLET, FILM COATED ORAL EVERY 8 HOURS PRN
Qty: 30 TABLET | Refills: 0 | Status: SHIPPED | OUTPATIENT
Start: 2025-06-17

## 2025-06-17 RX ORDER — BUPIVACAINE HYDROCHLORIDE 2.5 MG/ML
2 INJECTION, SOLUTION INFILTRATION; PERINEURAL ONCE
Status: COMPLETED | OUTPATIENT
Start: 2025-06-17 | End: 2025-06-17

## 2025-06-17 RX ADMIN — TRIAMCINOLONE ACETONIDE 40 MG: 40 INJECTION, SUSPENSION INTRA-ARTICULAR; INTRAMUSCULAR at 13:36

## 2025-06-17 RX ADMIN — BUPIVACAINE HYDROCHLORIDE 5 MG: 2.5 INJECTION, SOLUTION INFILTRATION; PERINEURAL at 13:36

## 2025-06-17 NOTE — PROGRESS NOTES
Subjective:      Patient ID: Brigido Cottrell is a 60 y.o. male who is here for an initial evaluation of left medial knee pain x 2 weeks.  No history of injury.  Left knee catches, frankie at times.  He had to resort to using a cane due to the left knee giving out.    Pain Scale 8/10 VAS.  Location of pain medial left knee.  Pain is worse with weight bearing.   Pain improves with elevation.   Previous treatments have included ibuprofen and Tylenol without relief.    He has a history of a prior meniscus tear in the right knee with arthroscopy.  He went on to develop arthritis and underwent a knee replacement 2022.    Review of Systems:  I have reviewed the clinically relevant past medical history, medications, allergies, family history, social history, and 13 point Review of Systems from the patient's recent history form & documented any details relevant to today's presenting complaints in the history above. The patient's self-reported past medical history, medications, allergies, family history, social history, and Review of Systems form from today's date have been scanned into the chart under the \"Media\" tab.    As outlined in the HPI.  Positive for poly-joint pain, swelling and stiffness.  Negative numbness or tingling into the affected extremity.     Past Medical History:   Diagnosis Date    Atrial fibrillation (HCC)     Class 1 obesity     COVID-19     Hyperlipidemia     Hypertension     Osteoarthritis        Family History   Problem Relation Age of Onset    Kidney Disease Mother     Heart Failure Mother     Heart Attack Father 47    Heart Disease Father     Kidney Disease Brother        Past Surgical History:   Procedure Laterality Date    ABLATION OF DYSRHYTHMIC FOCUS  2022    for atrial fibrillation treatment    APPENDECTOMY      COLONOSCOPY      KNEE ARTHROSCOPY Right 11/02/2022    RIGHT KNEE ARTHROSCOPY WITH MEDIAL MENISCUS REPAIR performed by Brandon Garcia MD at [facility] OR    MANDIBLE SURGERY

## 2025-06-18 ENCOUNTER — OFFICE VISIT (OUTPATIENT)
Dept: PSYCHOLOGY | Age: 60
End: 2025-06-18
Payer: COMMERCIAL

## 2025-06-18 DIAGNOSIS — F90.2 ATTENTION DEFICIT HYPERACTIVITY DISORDER, COMBINED TYPE, MODERATE: ICD-10-CM

## 2025-06-18 DIAGNOSIS — F43.23 ADJUSTMENT DISORDER WITH MIXED ANXIETY AND DEPRESSED MOOD: Primary | ICD-10-CM

## 2025-06-18 PROCEDURE — 1036F TOBACCO NON-USER: CPT | Performed by: PSYCHOLOGIST

## 2025-06-18 PROCEDURE — 90832 PSYTX W PT 30 MINUTES: CPT | Performed by: PSYCHOLOGIST

## 2025-06-18 ASSESSMENT — PATIENT HEALTH QUESTIONNAIRE - PHQ9
4. FEELING TIRED OR HAVING LITTLE ENERGY: MORE THAN HALF THE DAYS
7. TROUBLE CONCENTRATING ON THINGS, SUCH AS READING THE NEWSPAPER OR WATCHING TELEVISION: MORE THAN HALF THE DAYS
5. POOR APPETITE OR OVEREATING: NEARLY EVERY DAY
9. THOUGHTS THAT YOU WOULD BE BETTER OFF DEAD, OR OF HURTING YOURSELF: NOT AT ALL
3. TROUBLE FALLING OR STAYING ASLEEP: MORE THAN HALF THE DAYS
8. MOVING OR SPEAKING SO SLOWLY THAT OTHER PEOPLE COULD HAVE NOTICED. OR THE OPPOSITE, BEING SO FIGETY OR RESTLESS THAT YOU HAVE BEEN MOVING AROUND A LOT MORE THAN USUAL: MORE THAN HALF THE DAYS
6. FEELING BAD ABOUT YOURSELF - OR THAT YOU ARE A FAILURE OR HAVE LET YOURSELF OR YOUR FAMILY DOWN: MORE THAN HALF THE DAYS
SUM OF ALL RESPONSES TO PHQ QUESTIONS 1-9: 15
2. FEELING DOWN, DEPRESSED OR HOPELESS: MORE THAN HALF THE DAYS

## 2025-06-18 NOTE — PROGRESS NOTES
Behavioral Health Consultation  Giovany Plasencia, Ph.D.  Psychologist  2025     8:00 AM EDT      Time spent with Patient: 30 minutes  This is patient's second [facility] appointment.    Reason for Consult: anxiety  Referring Provider: Paloma Huizar MD  [address]  [place] [address]    Feedback for PCP:     Pt provided informed consent for the behavioral health program. Discussed with patient model of service to include the limits of confidentiality (i.e. abuse reporting, suicide intervention, etc.) and short-term intervention focused approach.  Pt indicated understanding.  Feedback given to PCP.    S:  Patient (Troy) brought in his completed CAARS and it was scored. The Inattention/Memory Problems category was scored with a T-Score of 83, the DSM Inattentive T-score was 90, and the DSM Hyperactive/Impulsive T-Score was 85, all in the very clinically significant range. The Problems with Self Concept category T-Score was 77, also in the clinically significant category. [place] meets the diagnostic criteria for ADHD, combined, severe.         Mental health history:     Social History     Tobacco Use    Smoking status: Former     Current packs/day: 0.00     Average packs/day: 0.5 packs/day for 20.0 years (10.0 ttl pk-yrs)     Types: Cigarettes     Start date:      Quit date:      Years since quittin.5     Passive exposure: Past    Smokeless tobacco: Never   Substance Use Topics    Alcohol use: Not Currently        Illicit drugs:   Social History     Substance and Sexual Activity   Drug Use No        O:  MSE:  Appearance: good hygiene   Attitude: cooperative and friendly  Consciousness: alert  Orientation: oriented to person, place, time, general circumstance  Memory: recent and remote memory intact  Attention/Concentration: intact during session  Psychomotor Activity: normal  Eye Contact: normal  Speech: normal rate and volume, well-articulated  Mood: anxious  Affect: congruent  Perception:

## 2025-06-19 NOTE — PROGRESS NOTES
from Last 3 Encounters:   06/20/25 107.5 kg (237 lb)   06/17/25 108.4 kg (239 lb)   06/17/25 108.4 kg (239 lb)     Constitutional: He is oriented to person, place, and time. He appears well-developed and well-nourished. In no acute distress.   Head: Normocephalic and atraumatic. Pupils equal and round.  Neck: Neck supple. No JVP or carotid bruit appreciated. No mass and no thyromegaly present. No lymphadenopathy present.  Cardiovascular: Normal rate. Normal heart sounds. Exam reveals no gallop and no friction rub. No murmur heard.  Pulmonary/Chest: Effort normal and breath sounds normal. No respiratory distress. He has no wheezes, rhonchi or rales.   Abdominal: Soft, non-tender. Bowel sounds are normal. He exhibits no organomegaly, mass or bruit.   Extremities: No edema. No cyanosis or clubbing. Pulses are 2+ radial/carotid bilaterally.  Neurological: No gross cranial nerve deficit. Coordination normal.   Skin: Skin is warm and dry. There is no rash or diaphoresis.   Psychiatric: He has a normal mood and affect. His speech is normal and behavior is normal.     Lab Review:   FLP:    Lab Results   Component Value Date/Time    TRIG 229 04/17/2024 10:56 AM    HDL 37 08/27/2024 12:30 PM     BUN/Creatinine:    Lab Results   Component Value Date/Time    BUN 14 04/16/2025 10:16 AM    CREATININE 1.1 04/16/2025 10:16 AM     PT/INR, TNI, HGB A1C:   Lab Results   Component Value Date/Time    TROPONINI <0.01 02/18/2023 10:17 AM    LABA1C 5.5 04/17/2024 10:56 AM      No results found for: \"CBCAUTODIF\"    EKG Interpretation  9/12/2023 NSR    Echo 2/20/2023  Summary  Normal left ventricle size, wall thickness, and systolic function with an  estimated ejection fraction of 55-60%. No regional wall motion abnormalities  are seen.  The right ventricle is normal in size and function.  Mild tricuspid regurgitation.  Trivial mitral regurgitation.  Estimated pulmonary artery systolic pressure is normal at 26 mmHg assuming a  right atrial

## 2025-06-20 ENCOUNTER — OFFICE VISIT (OUTPATIENT)
Dept: CARDIOLOGY CLINIC | Age: 60
End: 2025-06-20
Payer: COMMERCIAL

## 2025-06-20 VITALS
DIASTOLIC BLOOD PRESSURE: 90 MMHG | OXYGEN SATURATION: 97 % | SYSTOLIC BLOOD PRESSURE: 140 MMHG | BODY MASS INDEX: 32.1 KG/M2 | HEIGHT: 72 IN | WEIGHT: 237 LBS | HEART RATE: 62 BPM

## 2025-06-20 DIAGNOSIS — I48.0 PAF (PAROXYSMAL ATRIAL FIBRILLATION) (HCC): Primary | ICD-10-CM

## 2025-06-20 PROCEDURE — 3017F COLORECTAL CA SCREEN DOC REV: CPT | Performed by: INTERNAL MEDICINE

## 2025-06-20 PROCEDURE — 99214 OFFICE O/P EST MOD 30 MIN: CPT | Performed by: INTERNAL MEDICINE

## 2025-06-20 PROCEDURE — 3080F DIAST BP >= 90 MM HG: CPT | Performed by: INTERNAL MEDICINE

## 2025-06-20 PROCEDURE — 1036F TOBACCO NON-USER: CPT | Performed by: INTERNAL MEDICINE

## 2025-06-20 PROCEDURE — G8417 CALC BMI ABV UP PARAM F/U: HCPCS | Performed by: INTERNAL MEDICINE

## 2025-06-20 PROCEDURE — 3077F SYST BP >= 140 MM HG: CPT | Performed by: INTERNAL MEDICINE

## 2025-06-20 PROCEDURE — G8427 DOCREV CUR MEDS BY ELIG CLIN: HCPCS | Performed by: INTERNAL MEDICINE

## 2025-06-20 PROCEDURE — 93000 ELECTROCARDIOGRAM COMPLETE: CPT | Performed by: INTERNAL MEDICINE

## 2025-07-12 DIAGNOSIS — R05.9 COUGH, UNSPECIFIED TYPE: ICD-10-CM

## 2025-07-14 RX ORDER — FLUTICASONE PROPIONATE AND SALMETEROL 100; 50 UG/1; UG/1
POWDER RESPIRATORY (INHALATION)
Qty: 60 EACH | Refills: 1 | Status: SHIPPED | OUTPATIENT
Start: 2025-07-14

## 2025-07-14 NOTE — TELEPHONE ENCOUNTER
Future Appointments   Date Time Provider Department Center   7/23/2025  1:30 PM Giovany Plasencia, PhD [place] PSYCHOLOG [facility]   7/28/2025  8:40 AM [facility]       Last appt 6/17/25

## 2025-07-17 DIAGNOSIS — J32.9 BACTERIAL SINUSITIS: ICD-10-CM

## 2025-07-17 DIAGNOSIS — B96.89 BACTERIAL SINUSITIS: ICD-10-CM

## 2025-07-18 RX ORDER — IPRATROPIUM BROMIDE 21 UG/1
SPRAY, METERED NASAL
Qty: 30 ML | Refills: 2 | Status: SHIPPED | OUTPATIENT
Start: 2025-07-18

## 2025-07-23 ENCOUNTER — OFFICE VISIT (OUTPATIENT)
Dept: PSYCHOLOGY | Age: 60
End: 2025-07-23
Payer: COMMERCIAL

## 2025-07-23 DIAGNOSIS — F33.1 MAJOR DEPRESSIVE DISORDER, RECURRENT EPISODE, MODERATE (HCC): Primary | ICD-10-CM

## 2025-07-23 DIAGNOSIS — F90.2 ATTENTION DEFICIT HYPERACTIVITY DISORDER, COMBINED TYPE, MODERATE: ICD-10-CM

## 2025-07-23 PROCEDURE — 1036F TOBACCO NON-USER: CPT | Performed by: PSYCHOLOGIST

## 2025-07-23 PROCEDURE — 90832 PSYTX W PT 30 MINUTES: CPT | Performed by: PSYCHOLOGIST

## 2025-07-23 ASSESSMENT — PATIENT HEALTH QUESTIONNAIRE - PHQ9
9. THOUGHTS THAT YOU WOULD BE BETTER OFF DEAD, OR OF HURTING YOURSELF: NOT AT ALL
SUM OF ALL RESPONSES TO PHQ QUESTIONS 1-9: 17
8. MOVING OR SPEAKING SO SLOWLY THAT OTHER PEOPLE COULD HAVE NOTICED. OR THE OPPOSITE, BEING SO FIGETY OR RESTLESS THAT YOU HAVE BEEN MOVING AROUND A LOT MORE THAN USUAL: MORE THAN HALF THE DAYS
6. FEELING BAD ABOUT YOURSELF - OR THAT YOU ARE A FAILURE OR HAVE LET YOURSELF OR YOUR FAMILY DOWN: MORE THAN HALF THE DAYS
5. POOR APPETITE OR OVEREATING: NEARLY EVERY DAY
2. FEELING DOWN, DEPRESSED OR HOPELESS: MORE THAN HALF THE DAYS
7. TROUBLE CONCENTRATING ON THINGS, SUCH AS READING THE NEWSPAPER OR WATCHING TELEVISION: MORE THAN HALF THE DAYS
SUM OF ALL RESPONSES TO PHQ QUESTIONS 1-9: 17
4. FEELING TIRED OR HAVING LITTLE ENERGY: MORE THAN HALF THE DAYS
1. LITTLE INTEREST OR PLEASURE IN DOING THINGS: MORE THAN HALF THE DAYS
3. TROUBLE FALLING OR STAYING ASLEEP: MORE THAN HALF THE DAYS
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
## 2025-07-28 ENCOUNTER — OFFICE VISIT (OUTPATIENT)
Dept: PULMONOLOGY | Age: 60
End: 2025-07-28
Payer: COMMERCIAL

## 2025-07-28 VITALS
HEART RATE: 76 BPM | WEIGHT: 237.4 LBS | SYSTOLIC BLOOD PRESSURE: 124 MMHG | HEIGHT: 72 IN | DIASTOLIC BLOOD PRESSURE: 78 MMHG | RESPIRATION RATE: 18 BRPM | OXYGEN SATURATION: 93 % | TEMPERATURE: 97.6 F | BODY MASS INDEX: 32.15 KG/M2

## 2025-07-28 DIAGNOSIS — J45.40 MODERATE PERSISTENT ASTHMA WITHOUT COMPLICATION: Primary | ICD-10-CM

## 2025-07-28 DIAGNOSIS — E66.9 OBESITY (BMI 30-39.9): ICD-10-CM

## 2025-07-28 DIAGNOSIS — R05.3 CHRONIC COUGH: ICD-10-CM

## 2025-07-28 PROCEDURE — 1036F TOBACCO NON-USER: CPT | Performed by: INTERNAL MEDICINE

## 2025-07-28 PROCEDURE — 99214 OFFICE O/P EST MOD 30 MIN: CPT | Performed by: INTERNAL MEDICINE

## 2025-07-28 PROCEDURE — 3074F SYST BP LT 130 MM HG: CPT | Performed by: INTERNAL MEDICINE

## 2025-07-28 PROCEDURE — G8417 CALC BMI ABV UP PARAM F/U: HCPCS | Performed by: INTERNAL MEDICINE

## 2025-07-28 PROCEDURE — 3017F COLORECTAL CA SCREEN DOC REV: CPT | Performed by: INTERNAL MEDICINE

## 2025-07-28 PROCEDURE — 3078F DIAST BP <80 MM HG: CPT | Performed by: INTERNAL MEDICINE

## 2025-07-28 PROCEDURE — G8427 DOCREV CUR MEDS BY ELIG CLIN: HCPCS | Performed by: INTERNAL MEDICINE

## 2025-07-28 NOTE — PROGRESS NOTES
REASON FOR CONSULTATION/CC: cough  Chief Complaint   Patient presents with    Shortness of Breath     Thinks since last visit         Consult at request of   Paloma Huizar MD for      PCP: Paloma Huizar MD    HISTORY OF PRESENT ILLNESS: Brigido Cottrell is a 60 y.o. year old male with a history of               Asthma   Allergies  Eosinophilia            The patient (or guardian, if applicable) and other individuals in attendance with the patient were advised that Artificial Intelligence will be utilized during this visit to record, process the conversation to generate a clinical note, and support improvement of the AI technology. The patient (or guardian, if applicable) and other individuals in attendance at the appointment consented to the use of AI, including the recording.      History of Present Illness  The patient is a 60-year-old male with a history of asthma being treated with Wixela and albuterol, presenting for a follow-up visit regarding his chronic cough. The respiratory allergen profile shows significant elevation in IgE, numerous allergies, and significant eosinophilia.    Chronic Cough  - The patient reports that the previously prescribed antibiotics were effective in resolving his infection.  - He has been experiencing increased shortness of breath, which he attributes to lung damage caused by an unidentified organism.  - He has been performing deep breathing exercises to improve his lung function.    Fatigue  - His daily walks with his dog have been less frequent and shorter due to fatigue.  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  - He plans to incorporate swimming into his routine.    Asthma Management  - He used his albuterol inhaler today, marking the first time in several weeks.  - He continues to use Wixela twice daily.  - He has installed air conditioners and purifiers at home and plans to add filters to his air

## 2025-08-11 ENCOUNTER — HOSPITAL ENCOUNTER (EMERGENCY)
Age: 60
Discharge: HOME | End: 2025-08-11
Payer: COMMERCIAL

## 2025-08-11 ENCOUNTER — APPOINTMENT (OUTPATIENT)
Dept: GENERAL RADIOLOGY | Age: 60
End: 2025-08-11
Payer: COMMERCIAL

## 2025-08-11 VITALS
HEART RATE: 78 BPM | BODY MASS INDEX: 32.91 KG/M2 | SYSTOLIC BLOOD PRESSURE: 176 MMHG | TEMPERATURE: 97.7 F | HEIGHT: 72 IN | OXYGEN SATURATION: 93 % | WEIGHT: 243 LBS | RESPIRATION RATE: 16 BRPM | DIASTOLIC BLOOD PRESSURE: 97 MMHG

## 2025-08-11 DIAGNOSIS — S90.859A FOREIGN BODY IN FOOT, INITIAL ENCOUNTER: Primary | ICD-10-CM

## 2025-08-11 PROCEDURE — 99283 EMERGENCY DEPT VISIT LOW MDM: CPT

## 2025-08-11 PROCEDURE — 10120 INC&RMVL FB SUBQ TISS SMPL: CPT

## 2025-08-11 PROCEDURE — 73620 X-RAY EXAM OF FOOT: CPT

## 2025-08-11 ASSESSMENT — PAIN - FUNCTIONAL ASSESSMENT: PAIN_FUNCTIONAL_ASSESSMENT: 0-10

## 2025-08-12 ENCOUNTER — HOSPITAL ENCOUNTER (OUTPATIENT)
Dept: PULMONOLOGY | Age: 60
Discharge: HOME | End: 2025-08-12
Attending: INTERNAL MEDICINE
Payer: COMMERCIAL

## 2025-08-12 DIAGNOSIS — U07.1 COVID-19: Primary | ICD-10-CM

## 2025-08-12 DIAGNOSIS — J45.40 MODERATE PERSISTENT ASTHMA WITHOUT COMPLICATION: ICD-10-CM

## 2025-08-12 LAB
DLCO %PRED: 110 %
DLCO PRED: NORMAL
DLCO/VA %PRED: NORMAL
DLCO/VA PRED: NORMAL
DLCO/VA: NORMAL
DLCO: NORMAL
EXPIRATORY TIME-POST: NORMAL
EXPIRATORY TIME: NORMAL
FEF 25-75 %CHNG: NORMAL
FEF 25-75 POST %PRED: NORMAL
FEF 25-75% %PRED-PRE: NORMAL
FEF 25-75% PRED: NORMAL
FEF 25-75-POST: NORMAL
FEF 25-75-PRE: NORMAL
FEV1 %PRED-POST: 107 %
FEV1 %PRED-PRE: 97 %
FEV1 PRED: NORMAL
FEV1-POST: NORMAL
FEV1-PRE: NORMAL
FEV1/FVC %PRED-POST: NORMAL
FEV1/FVC %PRED-PRE: NORMAL
FEV1/FVC PRED: NORMAL
FEV1/FVC-POST: 83 %
FEV1/FVC-PRE: 75 %
FVC %PRED-POST: 99 L
FVC %PRED-PRE: 99 %
FVC PRED: NORMAL
FVC-POST: 4.78 L
FVC-PRE: 4.75 L
GAW %PRED: NORMAL
GAW PRED: NORMAL
GAW: NORMAL
IC PRE %PRED: NORMAL
IC PRED: NORMAL
IC: NORMAL
MEP: NORMAL
MIP: NORMAL
MVV %PRED-PRE: NORMAL
MVV PRED: NORMAL
MVV-PRE: NORMAL
PEF %PRED-POST: NORMAL
PEF %PRED-PRE: NORMAL
PEF PRED: NORMAL
PEF%CHNG: NORMAL
PEF-POST: NORMAL
PEF-PRE: NORMAL
RAW %PRED: 47 %
RAW PRED: NORMAL
RAW: 0.69 CMH2O/L/S
RV PRE %PRED: NORMAL
RV PRED: NORMAL
RV: NORMAL
SVC %PRED: NORMAL
SVC PRED: NORMAL
SVC: NORMAL
TLC PRE %PRED: 101 %
TLC PRED: NORMAL
TLC: NORMAL
VA %PRED: NORMAL
VA PRED: NORMAL
VA: NORMAL
VTG %PRED: 81 %
VTG PRED: NORMAL
VTG: NORMAL

## 2025-08-12 PROCEDURE — 94060 EVALUATION OF WHEEZING: CPT

## 2025-08-12 PROCEDURE — 94729 DIFFUSING CAPACITY: CPT

## 2025-08-12 PROCEDURE — 94726 PLETHYSMOGRAPHY LUNG VOLUMES: CPT

## 2025-08-12 PROCEDURE — 94664 DEMO&/EVAL PT USE INHALER: CPT

## 2025-08-12 PROCEDURE — 94640 AIRWAY INHALATION TREATMENT: CPT

## 2025-08-12 PROCEDURE — 6370000000 HC RX 637 (ALT 250 FOR IP): Performed by: INTERNAL MEDICINE

## 2025-08-12 RX ORDER — ALBUTEROL SULFATE 90 UG/1
4 INHALANT RESPIRATORY (INHALATION) ONCE
Status: COMPLETED | OUTPATIENT
Start: 2025-08-12 | End: 2025-08-12

## 2025-08-12 RX ADMIN — ALBUTEROL SULFATE 4 PUFF: 90 AEROSOL, METERED RESPIRATORY (INHALATION) at 08:39

## 2025-08-12 ASSESSMENT — PULMONARY FUNCTION TESTS
FEV1_PERCENT_PREDICTED_PRE: 97
FVC_PERCENT_PREDICTED_PRE: 99
FVC_PERCENT_PREDICTED_POST: 99
FVC_POST: 4.78
FEV1/FVC_POST: 83
FEV1/FVC_PRE: 75
FEV1_PERCENT_PREDICTED_POST: 107
FVC_PRE: 4.75

## 2025-08-28 ENCOUNTER — OFFICE VISIT (OUTPATIENT)
Dept: INTERNAL MEDICINE CLINIC | Age: 60
End: 2025-08-28
Payer: COMMERCIAL

## 2025-08-28 VITALS
BODY MASS INDEX: 32.82 KG/M2 | SYSTOLIC BLOOD PRESSURE: 120 MMHG | DIASTOLIC BLOOD PRESSURE: 84 MMHG | HEART RATE: 68 BPM | WEIGHT: 242 LBS | OXYGEN SATURATION: 96 %

## 2025-08-28 DIAGNOSIS — L82.1 SEBORRHEIC KERATOSIS: Primary | ICD-10-CM

## 2025-08-28 DIAGNOSIS — I10 PRIMARY HYPERTENSION: ICD-10-CM

## 2025-08-28 DIAGNOSIS — F33.1 MODERATE EPISODE OF RECURRENT MAJOR DEPRESSIVE DISORDER (HCC): ICD-10-CM

## 2025-08-28 DIAGNOSIS — Z12.83 SKIN CANCER SCREENING: ICD-10-CM

## 2025-08-28 DIAGNOSIS — G25.81 RLS (RESTLESS LEGS SYNDROME): ICD-10-CM

## 2025-08-28 PROCEDURE — 99214 OFFICE O/P EST MOD 30 MIN: CPT | Performed by: STUDENT IN AN ORGANIZED HEALTH CARE EDUCATION/TRAINING PROGRAM

## 2025-08-28 PROCEDURE — 3074F SYST BP LT 130 MM HG: CPT | Performed by: STUDENT IN AN ORGANIZED HEALTH CARE EDUCATION/TRAINING PROGRAM

## 2025-08-28 PROCEDURE — 3017F COLORECTAL CA SCREEN DOC REV: CPT | Performed by: STUDENT IN AN ORGANIZED HEALTH CARE EDUCATION/TRAINING PROGRAM

## 2025-08-28 PROCEDURE — 3079F DIAST BP 80-89 MM HG: CPT | Performed by: STUDENT IN AN ORGANIZED HEALTH CARE EDUCATION/TRAINING PROGRAM

## 2025-08-28 PROCEDURE — G8417 CALC BMI ABV UP PARAM F/U: HCPCS | Performed by: STUDENT IN AN ORGANIZED HEALTH CARE EDUCATION/TRAINING PROGRAM

## 2025-08-28 PROCEDURE — G8427 DOCREV CUR MEDS BY ELIG CLIN: HCPCS | Performed by: STUDENT IN AN ORGANIZED HEALTH CARE EDUCATION/TRAINING PROGRAM

## 2025-08-28 PROCEDURE — G2211 COMPLEX E/M VISIT ADD ON: HCPCS | Performed by: STUDENT IN AN ORGANIZED HEALTH CARE EDUCATION/TRAINING PROGRAM

## 2025-08-28 PROCEDURE — 1036F TOBACCO NON-USER: CPT | Performed by: STUDENT IN AN ORGANIZED HEALTH CARE EDUCATION/TRAINING PROGRAM

## 2025-09-05 ENCOUNTER — HOSPITAL ENCOUNTER (EMERGENCY)
Age: 60
Discharge: HOME | End: 2025-09-05
Attending: STUDENT IN AN ORGANIZED HEALTH CARE EDUCATION/TRAINING PROGRAM
Payer: COMMERCIAL

## 2025-09-05 ENCOUNTER — APPOINTMENT (OUTPATIENT)
Dept: GENERAL RADIOLOGY | Age: 60
End: 2025-09-05
Payer: COMMERCIAL

## 2025-09-05 VITALS
DIASTOLIC BLOOD PRESSURE: 110 MMHG | HEIGHT: 72 IN | SYSTOLIC BLOOD PRESSURE: 176 MMHG | HEART RATE: 72 BPM | TEMPERATURE: 98 F | WEIGHT: 245.59 LBS | RESPIRATION RATE: 18 BRPM | OXYGEN SATURATION: 97 % | BODY MASS INDEX: 33.26 KG/M2

## 2025-09-05 DIAGNOSIS — I10 ESSENTIAL HYPERTENSION: ICD-10-CM

## 2025-09-05 DIAGNOSIS — B34.9 ACUTE VIRAL SYNDROME: Primary | ICD-10-CM

## 2025-09-05 PROCEDURE — 6370000000 HC RX 637 (ALT 250 FOR IP): Performed by: STUDENT IN AN ORGANIZED HEALTH CARE EDUCATION/TRAINING PROGRAM

## 2025-09-05 PROCEDURE — 71046 X-RAY EXAM CHEST 2 VIEWS: CPT

## 2025-09-05 PROCEDURE — 87804 INFLUENZA ASSAY W/OPTIC: CPT

## 2025-09-05 PROCEDURE — 87635 SARS-COV-2 COVID-19 AMP PRB: CPT

## 2025-09-05 PROCEDURE — 6360000002 HC RX W HCPCS: Performed by: STUDENT IN AN ORGANIZED HEALTH CARE EDUCATION/TRAINING PROGRAM

## 2025-09-05 RX ORDER — LISINOPRIL 10 MG/1
10 TABLET ORAL ONCE
Status: COMPLETED | OUTPATIENT
Start: 2025-09-05 | End: 2025-09-05

## 2025-09-05 RX ORDER — HYDROCHLOROTHIAZIDE 12.5 MG/1
12.5 CAPSULE ORAL ONCE
Status: COMPLETED | OUTPATIENT
Start: 2025-09-05 | End: 2025-09-05

## 2025-09-05 RX ORDER — KETOROLAC TROMETHAMINE 15 MG/ML
15 INJECTION, SOLUTION INTRAMUSCULAR; INTRAVENOUS ONCE
Status: COMPLETED | OUTPATIENT
Start: 2025-09-05 | End: 2025-09-05

## 2025-09-05 RX ADMIN — LISINOPRIL 10 MG: 10 TABLET ORAL at 09:29

## 2025-09-05 RX ADMIN — HYDROCHLOROTHIAZIDE 12.5 MG: 12.5 CAPSULE ORAL at 09:29

## 2025-09-05 RX ADMIN — KETOROLAC TROMETHAMINE 15 MG: 15 INJECTION, SOLUTION INTRAMUSCULAR; INTRAVENOUS at 09:11

## 2025-09-05 ASSESSMENT — PAIN SCALES - GENERAL
PAINLEVEL_OUTOF10: 0
PAINLEVEL_OUTOF10: 0

## 2025-09-05 ASSESSMENT — PAIN - FUNCTIONAL ASSESSMENT
PAIN_FUNCTIONAL_ASSESSMENT: 0-10
PAIN_FUNCTIONAL_ASSESSMENT: 0-10

## (undated) DEVICE — E-Z CLEAN, NON-STICK, PTFE COATED, ELECTROSURGICAL BLADE ELECTRODE, 2.5 INCH (6.35 CM): Brand: EZ CLEAN

## (undated) DEVICE — ELECTRODE PT RET AD L9FT HI MOIST COND ADH HYDRGEL CORDED

## (undated) DEVICE — TRAP FLUID

## (undated) DEVICE — PATIENT TRACKER 9734887XOM NON-INVASIVE

## (undated) DEVICE — COVER,MAYO STAND,STERILE: Brand: MEDLINE

## (undated) DEVICE — DRESSING THERABOND 3D ANTIMIC CNTCT SYS 15INCHX10INCH

## (undated) DEVICE — 3M™ STERI-DRAPE™ INSTRUMENT POUCH 1018: Brand: STERI-DRAPE™

## (undated) DEVICE — COUNTER NDL 40 COUNT HLD 70 NUM FOAM BLK SGL MAG W BLDE REMV

## (undated) DEVICE — PAD,NON-ADHERENT,3X8,STERILE,LF,1/PK: Brand: MEDLINE

## (undated) DEVICE — ADHESIVE SKIN CLSR 0.7ML TOP DERMBND ADV

## (undated) DEVICE — UNDERGLOVE SURG SZ 8 BLU LTX FREE SYN POLYISOPRENE POLYMER

## (undated) DEVICE — BLANKET WRM W29.9XL79.1IN UP BODY FORC AIR MISTRAL-AIR

## (undated) DEVICE — ENT: Brand: MEDLINE INDUSTRIES, INC.

## (undated) DEVICE — INSTRUMENT TRACKER 9733533XOM ENT 1PK

## (undated) DEVICE — KIT,ANTI FOG,W/SPONGE & FLUID,SOFT PACK: Brand: MEDLINE

## (undated) DEVICE — TUBING PMP L6FT CONT WAVE EXTN

## (undated) DEVICE — ZIMMER® STERILE DISPOSABLE TOURNIQUET CUFF WITH PLC, SINGLE PORT, SINGLE BLADDER, 34 IN. (86 CM)

## (undated) DEVICE — TUBING PMP L16FT MAIN DISP FOR AR-6400 AR-6475

## (undated) DEVICE — 4-PORT MANIFOLD: Brand: NEPTUNE 2

## (undated) DEVICE — SUTURE ABSORBABLE MONOFILAMENT 1 CTX 36 CM 48 MM VIO PDS +

## (undated) DEVICE — GLOVE ORTHO 7 1/2   MSG9475

## (undated) DEVICE — SCREW BNE L25MM DIA2.5MM KNEE FULL THRD HEX FEM PERSONA (NOT IMPLANTED)

## (undated) DEVICE — TOWEL,OR,DSP,ST,BLUE,DLX,8/PK,10PK/CS: Brand: MEDLINE

## (undated) DEVICE — PIN HOLDING HDLSS 3.2X75 MM TROCAR ZUK

## (undated) DEVICE — SUTURE MONOCRYL STRATAFIX SPRL SZ 3-0 L12IN ABSRB UD FS-1 L30X30CM SXMP2B410

## (undated) DEVICE — DUAL CUT SAGITTAL BLADE

## (undated) DEVICE — ADHESIVE SKIN CLOSURE WND 8.661X1.5 IN 22 CM LIQUIBAND SECUR

## (undated) DEVICE — TOWEL,OR,DSP,ST,BLUE,STD,4/PK,20PK/CS: Brand: MEDLINE

## (undated) DEVICE — PROBE ABLAT XL 90DEG ASPIR BPLR RF 1 PC ELECTRD ERGO HNDL

## (undated) DEVICE — 3M™ IOBAN™ 2 ANTIMICROBIAL INCISE DRAPE 6648EZ: Brand: IOBAN™ 2

## (undated) DEVICE — SOLUTION IRRIG 3000ML LAC R FLX CONT

## (undated) DEVICE — GLOVE ORANGE PI 7 1/2   MSG9075

## (undated) DEVICE — BOOT POS LEG DEMAYO

## (undated) DEVICE — 3M™ COBAN™ NL STERILE NON-LATEX SELF-ADHERENT WRAP, 2086S, 6 IN X 5 YD (15 CM X 4,5 M), 12 ROLLS/CASE: Brand: 3M™ COBAN™

## (undated) DEVICE — BLADE SHV L13CM DIA4MM EXCALIBUR AGG COOLCUT

## (undated) DEVICE — TUBING, SUCTION, 1/4" X 12', STRAIGHT: Brand: MEDLINE

## (undated) DEVICE — 3M™ TEGADERM™ TRANSPARENT FILM DRESSING FRAME STYLE, 1627, 4 IN X 10 IN (10 CM X 25 CM), 20/CT 4CT/CASE: Brand: 3M™ TEGADERM™

## (undated) DEVICE — Device

## (undated) DEVICE — SOLUTION IRRIG 1000ML STRL H2O USP PLAS POUR BTL

## (undated) DEVICE — TOTAL KNEE: Brand: MEDLINE INDUSTRIES, INC.

## (undated) DEVICE — SPONGE,NEURO,1"X3",XR,STRL,LF,10/PK: Brand: MEDLINE

## (undated) DEVICE — 3 BONE CEMENT MIXER: Brand: MIXEVAC

## (undated) DEVICE — BLADE 1884080EM TRICUT 4MMX13CM M4 ROHS: Brand: FUSION®

## (undated) DEVICE — GOWN,SIRUS,POLYRNF,BRTHSLV,XL,30/CS: Brand: MEDLINE

## (undated) DEVICE — CUFF RESTRN WRST OR ANK 45FT AD FOAM

## (undated) DEVICE — SOLUTION IV 1000ML 0.9% SOD CHL

## (undated) DEVICE — COAGULATOR SUCT 8FR L6IN HNDL FOR ENT PROC

## (undated) DEVICE — NEPTUNE E-SEP SMOKE EVACUATION PENCIL, COATED, 70MM BLADE, PUSH BUTTON SWITCH: Brand: NEPTUNE E-SEP

## (undated) DEVICE — MAT FLR W32XL58IN

## (undated) DEVICE — BOWL AND CEMENT CARTRIDGE WITH BREAKAWAY FEMORAL NOZZLE AND MEDIUM PRESSURIZER: Brand: ACM

## (undated) DEVICE — TUBING FLD MGMT Y DBL SPIK DUALWAVE

## (undated) DEVICE — SPONGE,LAP,18"X18",DLX,XR,ST,5/PK,40/PK: Brand: MEDLINE

## (undated) DEVICE — TOWEL,STOP FLAG GOLD N-W: Brand: MEDLINE

## (undated) DEVICE — STERILE PVP: Brand: MEDLINE INDUSTRIES, INC.

## (undated) DEVICE — SUTURE MCRYL SZ 4-0 L27IN ABSRB UD L19MM PS-2 1/2 CIR PRIM Y426H

## (undated) DEVICE — KNEE ARTHROSCOPY: Brand: MEDLINE INDUSTRIES, INC.

## (undated) DEVICE — APPLICATOR MEDICATED 26 CC SOLUTION HI LT ORNG CHLORAPREP

## (undated) DEVICE — MERCY HEALTH WEST TURNOVER: Brand: MEDLINE INDUSTRIES, INC.

## (undated) DEVICE — FIRM 8CM: Brand: NASOPORE

## (undated) DEVICE — GOWN SIRUS NONREIN XL W/TWL: Brand: MEDLINE INDUSTRIES, INC.

## (undated) DEVICE — SOLUTION IRRIG 3000ML 0.9% SOD CHL ARTHROMATIC PLAS CONT

## (undated) DEVICE — CANARY HOME BASE STATION

## (undated) DEVICE — ELECTROSURGICAL PENCIL ROCKER SWITCH NON COATED BLADE ELECTRODE 10 FT (3 M) CORD HOLSTER: Brand: MEGADYNE

## (undated) DEVICE — SUTURE STRATAFIX SYMMETRIC PDS + SZ 2-0 L18IN ABSRB VLT SXPP1A403

## (undated) DEVICE — SYRINGE MED 30ML STD CLR PLAS LUERLOCK TIP N CTRL DISP

## (undated) DEVICE — TUBING 1895522 5PK STRAIGHTSHOT TO XPS: Brand: STRAIGHTSHOT®

## (undated) DEVICE — COVER,MAYO STAND,XL,STERILE: Brand: MEDLINE

## (undated) DEVICE — SUTURE PDS + SZ 0 L27IN ABSRB VLT L36MM CT 1 1 2 CIR PDP340H

## (undated) DEVICE — SST BUR, WIRE PASS DRILL, 2 FLUTES, MED., 2MM DIA.: Brand: MICROAIRE®

## (undated) DEVICE — FOAM BUMP, LARGE: Brand: MEDLINE INDUSTRIES, INC.

## (undated) DEVICE — BLADE SHV L13CM DIA4MM TAPR TIP SCIS LIKE CUT OVL OUTER

## (undated) DEVICE — SCREW BNE HD 3.5X48 MM HEX PERSONA (NOT IMPLANTED)